# Patient Record
Sex: MALE | Race: WHITE | NOT HISPANIC OR LATINO | Employment: FULL TIME | URBAN - METROPOLITAN AREA
[De-identification: names, ages, dates, MRNs, and addresses within clinical notes are randomized per-mention and may not be internally consistent; named-entity substitution may affect disease eponyms.]

---

## 2017-01-26 ENCOUNTER — HISTORICAL (OUTPATIENT)
Dept: LAB | Facility: HOSPITAL | Age: 62
End: 2017-01-26

## 2018-02-10 ENCOUNTER — HISTORICAL (OUTPATIENT)
Dept: LAB | Facility: HOSPITAL | Age: 63
End: 2018-02-10

## 2018-02-10 LAB
ABS NEUT (OLG): 7.7 X10(3)/MCL (ref 2.1–9.2)
ALBUMIN SERPL-MCNC: 4.3 GM/DL (ref 3.4–5)
ALBUMIN/GLOB SERPL: 1.3 RATIO (ref 1.1–2)
ALP SERPL-CCNC: 88 UNIT/L (ref 46–116)
ALT SERPL-CCNC: 47 UNIT/L (ref 12–78)
AST SERPL-CCNC: 30 UNIT/L (ref 15–37)
BASOPHILS # BLD AUTO: 0.1 X10(3)/MCL
BASOPHILS NFR BLD AUTO: 1 % (ref 0–2)
BILIRUB SERPL-MCNC: 0.5 MG/DL (ref 0.2–1)
BILIRUBIN DIRECT+TOT PNL SERPL-MCNC: 0.17 MG/DL (ref 0–0.2)
BILIRUBIN DIRECT+TOT PNL SERPL-MCNC: 0.37 MG/DL (ref 0–0.8)
BUN SERPL-MCNC: 18.7 MG/DL (ref 7–18)
CALCIUM SERPL-MCNC: 9.6 MG/DL (ref 8.5–10.1)
CHLORIDE SERPL-SCNC: 102 MMOL/L (ref 98–107)
CHOLEST SERPL-MCNC: 187 MG/DL (ref 0–200)
CHOLEST/HDLC SERPL: 3.7 {RATIO} (ref 0–5)
CO2 SERPL-SCNC: 27.3 MMOL/L (ref 21–32)
CREAT SERPL-MCNC: 1.03 MG/DL (ref 0.6–1.3)
DEPRECATED CALCIDIOL+CALCIFEROL SERPL-MC: 21.87 NG/ML (ref 30–80)
EOSINOPHIL # BLD AUTO: 0.4 X10(3)/MCL
EOSINOPHIL NFR BLD AUTO: 3 %
ERYTHROCYTE [DISTWIDTH] IN BLOOD BY AUTOMATED COUNT: 12.2 % (ref 11.5–17)
GLOBULIN SER-MCNC: 3.4 GM/DL (ref 2.4–3.5)
GLUCOSE SERPL-MCNC: 104 MG/DL (ref 74–106)
HCT VFR BLD AUTO: 38.7 % (ref 42–52)
HDLC SERPL-MCNC: 50 MG/DL (ref 40–60)
HGB BLD-MCNC: 13.2 GM/DL (ref 14–18)
LDLC SERPL CALC-MCNC: 104 MG/DL (ref 0–129)
LYMPHOCYTES # BLD AUTO: 2.3 X10(3)/MCL
LYMPHOCYTES NFR BLD AUTO: 21 % (ref 13–40)
MCH RBC QN AUTO: 32.9 PG (ref 27–31)
MCHC RBC AUTO-ENTMCNC: 34.1 GM/DL (ref 33–36)
MCV RBC AUTO: 96.5 FL (ref 80–94)
MONOCYTES # BLD AUTO: 0.8 X10(3)/MCL
MONOCYTES NFR BLD AUTO: 7 % (ref 2–11)
NEUTROPHILS # BLD AUTO: 7.7 X10(3)/MCL (ref 2.1–9.2)
NEUTROPHILS NFR BLD AUTO: 68 % (ref 47–80)
PLATELET # BLD AUTO: 279 X10(3)/MCL (ref 130–400)
PMV BLD AUTO: 9.2 FL (ref 7.4–10.4)
POTASSIUM SERPL-SCNC: 4.5 MMOL/L (ref 3.5–5.1)
PROT SERPL-MCNC: 7.7 GM/DL (ref 6.4–8.2)
PSA SERPL-MCNC: 1.06 NG/ML (ref 0–4)
RBC # BLD AUTO: 4.01 X10(6)/MCL (ref 4.7–6.1)
SODIUM SERPL-SCNC: 137 MMOL/L (ref 136–145)
TRIGL SERPL-MCNC: 167 MG/DL
TSH SERPL-ACNC: 1.89 MIU/ML (ref 0.36–3.74)
VLDLC SERPL CALC-MCNC: 33 MG/DL
WBC # SPEC AUTO: 11.3 X10(3)/MCL (ref 4.5–11.5)

## 2018-02-21 ENCOUNTER — HISTORICAL (OUTPATIENT)
Dept: RADIOLOGY | Facility: HOSPITAL | Age: 63
End: 2018-02-21

## 2018-07-31 LAB — CRC RECOMMENDATION EXT: NORMAL

## 2019-02-09 ENCOUNTER — HISTORICAL (OUTPATIENT)
Dept: LAB | Facility: HOSPITAL | Age: 64
End: 2019-02-09

## 2019-02-09 LAB
ABS NEUT (OLG): 4.34 X10(3)/MCL (ref 2.1–9.2)
ALBUMIN SERPL-MCNC: 4.1 GM/DL (ref 3.4–5)
ALBUMIN/GLOB SERPL: 1.1 RATIO (ref 1.1–2)
ALP SERPL-CCNC: 85 UNIT/L (ref 46–116)
ALT SERPL-CCNC: 39 UNIT/L (ref 12–78)
AST SERPL-CCNC: 23 UNIT/L (ref 15–37)
BASOPHILS # BLD AUTO: 0 X10(3)/MCL (ref 0–0.2)
BASOPHILS NFR BLD AUTO: 1 %
BILIRUB SERPL-MCNC: 0.5 MG/DL (ref 0.2–1)
BILIRUBIN DIRECT+TOT PNL SERPL-MCNC: 0.12 MG/DL (ref 0–0.2)
BILIRUBIN DIRECT+TOT PNL SERPL-MCNC: 0.38 MG/DL (ref 0–0.8)
BUN SERPL-MCNC: 17.5 MG/DL (ref 7–18)
CALCIUM SERPL-MCNC: 9.6 MG/DL (ref 8.5–10.1)
CHLORIDE SERPL-SCNC: 103 MMOL/L (ref 98–107)
CHOLEST SERPL-MCNC: 178 MG/DL (ref 0–200)
CHOLEST/HDLC SERPL: 3 {RATIO} (ref 0–5)
CO2 SERPL-SCNC: 27 MMOL/L (ref 21–32)
CREAT SERPL-MCNC: 0.95 MG/DL (ref 0.6–1.3)
DEPRECATED CALCIDIOL+CALCIFEROL SERPL-MC: 26.83 NG/ML (ref 30–80)
EOSINOPHIL # BLD AUTO: 0.3 X10(3)/MCL (ref 0–0.9)
EOSINOPHIL NFR BLD AUTO: 4 %
ERYTHROCYTE [DISTWIDTH] IN BLOOD BY AUTOMATED COUNT: 11.8 % (ref 11.5–17)
GLOBULIN SER-MCNC: 3.9 GM/DL (ref 2.4–3.5)
GLUCOSE SERPL-MCNC: 92 MG/DL (ref 74–106)
HCT VFR BLD AUTO: 40.3 % (ref 42–52)
HDLC SERPL-MCNC: 59 MG/DL (ref 40–60)
HGB BLD-MCNC: 13.5 GM/DL (ref 14–18)
IMM GRANULOCYTES # BLD AUTO: 0.01 % (ref 0–0.02)
IMM GRANULOCYTES NFR BLD AUTO: 0.1 % (ref 0–0.43)
LDLC SERPL CALC-MCNC: 103 MG/DL (ref 0–129)
LYMPHOCYTES # BLD AUTO: 2 X10(3)/MCL (ref 0.6–4.6)
LYMPHOCYTES NFR BLD AUTO: 28 %
MCH RBC QN AUTO: 33.6 PG (ref 27–31)
MCHC RBC AUTO-ENTMCNC: 33.5 GM/DL (ref 33–36)
MCV RBC AUTO: 100.2 FL (ref 80–94)
MONOCYTES # BLD AUTO: 0.5 X10(3)/MCL (ref 0.1–1.3)
MONOCYTES NFR BLD AUTO: 7 %
NEUTROPHILS # BLD AUTO: 4.34 X10(3)/MCL (ref 1.4–7.9)
NEUTROPHILS NFR BLD AUTO: 60 %
PLATELET # BLD AUTO: 277 X10(3)/MCL (ref 130–400)
PMV BLD AUTO: 11 FL (ref 9.4–12.4)
POTASSIUM SERPL-SCNC: 4.5 MMOL/L (ref 3.5–5.1)
PROT SERPL-MCNC: 8 GM/DL (ref 6.4–8.2)
PSA SERPL-MCNC: 2.36 NG/ML (ref 0–4)
RBC # BLD AUTO: 4.02 X10(6)/MCL (ref 4.7–6.1)
SODIUM SERPL-SCNC: 139 MMOL/L (ref 136–145)
TRIGL SERPL-MCNC: 79 MG/DL
VLDLC SERPL CALC-MCNC: 16 MG/DL
WBC # SPEC AUTO: 7.2 X10(3)/MCL (ref 4.5–11.5)

## 2019-08-31 ENCOUNTER — HISTORICAL (OUTPATIENT)
Dept: LAB | Facility: HOSPITAL | Age: 64
End: 2019-08-31

## 2019-08-31 LAB — PSA SERPL-MCNC: 1.28 NG/ML (ref 0–4)

## 2020-02-22 ENCOUNTER — HISTORICAL (OUTPATIENT)
Dept: LAB | Facility: HOSPITAL | Age: 65
End: 2020-02-22

## 2020-02-22 LAB
ABS NEUT (OLG): 2.93 X10(3)/MCL (ref 2.1–9.2)
ALBUMIN SERPL-MCNC: 4.2 GM/DL (ref 3.4–5)
ALBUMIN/GLOB SERPL: 1.2 RATIO (ref 1.1–2)
ALP SERPL-CCNC: 76 UNIT/L (ref 46–116)
ALT SERPL-CCNC: 48 UNIT/L (ref 12–78)
AST SERPL-CCNC: 33 UNIT/L (ref 15–37)
BASOPHILS # BLD AUTO: 0 X10(3)/MCL (ref 0–0.2)
BASOPHILS NFR BLD AUTO: 1 %
BILIRUB SERPL-MCNC: 0.4 MG/DL (ref 0.2–1)
BILIRUBIN DIRECT+TOT PNL SERPL-MCNC: 0.15 MG/DL (ref 0–0.2)
BILIRUBIN DIRECT+TOT PNL SERPL-MCNC: 0.25 MG/DL (ref 0–0.8)
BUN SERPL-MCNC: 19.3 MG/DL (ref 7–18)
CALCIUM SERPL-MCNC: 9.8 MG/DL (ref 8.5–10.1)
CHLORIDE SERPL-SCNC: 101 MMOL/L (ref 98–107)
CHOLEST SERPL-MCNC: 189 MG/DL (ref 0–200)
CHOLEST/HDLC SERPL: 3.6 {RATIO} (ref 0–5)
CO2 SERPL-SCNC: 27.8 MMOL/L (ref 21–32)
CREAT SERPL-MCNC: 0.92 MG/DL (ref 0.6–1.3)
DEPRECATED CALCIDIOL+CALCIFEROL SERPL-MC: 24.62 NG/ML (ref 30–80)
EOSINOPHIL # BLD AUTO: 0.2 X10(3)/MCL (ref 0–0.9)
EOSINOPHIL NFR BLD AUTO: 4 %
ERYTHROCYTE [DISTWIDTH] IN BLOOD BY AUTOMATED COUNT: 11.8 % (ref 11.5–17)
EST. AVERAGE GLUCOSE BLD GHB EST-MCNC: 108 MG/DL
GLOBULIN SER-MCNC: 3.5 GM/DL (ref 2.4–3.5)
GLUCOSE SERPL-MCNC: 106 MG/DL (ref 74–106)
HBA1C MFR BLD: 5.4 % (ref 4.5–6.2)
HCT VFR BLD AUTO: 39.8 % (ref 42–52)
HDLC SERPL-MCNC: 52 MG/DL (ref 40–60)
HGB BLD-MCNC: 13.6 GM/DL (ref 14–18)
IMM GRANULOCYTES # BLD AUTO: 0.01 % (ref 0–0.02)
IMM GRANULOCYTES NFR BLD AUTO: 0.2 % (ref 0–0.43)
LDLC SERPL CALC-MCNC: 98 MG/DL (ref 0–129)
LYMPHOCYTES # BLD AUTO: 2.1 X10(3)/MCL (ref 0.6–4.6)
LYMPHOCYTES NFR BLD AUTO: 37 %
MCH RBC QN AUTO: 33.1 PG (ref 27–31)
MCHC RBC AUTO-ENTMCNC: 34.2 GM/DL (ref 33–36)
MCV RBC AUTO: 96.8 FL (ref 80–94)
MONOCYTES # BLD AUTO: 0.5 X10(3)/MCL (ref 0.1–1.3)
MONOCYTES NFR BLD AUTO: 8 %
NEUTROPHILS # BLD AUTO: 2.93 X10(3)/MCL (ref 1.4–7.9)
NEUTROPHILS NFR BLD AUTO: 50 %
PLATELET # BLD AUTO: 269 X10(3)/MCL (ref 130–400)
PMV BLD AUTO: 10.2 FL (ref 9.4–12.4)
POTASSIUM SERPL-SCNC: 4.6 MMOL/L (ref 3.5–5.1)
PROT SERPL-MCNC: 7.7 GM/DL (ref 6.4–8.2)
PSA SERPL-MCNC: 1.87 NG/ML (ref 0–4)
RBC # BLD AUTO: 4.11 X10(6)/MCL (ref 4.7–6.1)
SODIUM SERPL-SCNC: 139 MMOL/L (ref 136–145)
TRIGL SERPL-MCNC: 196 MG/DL
TSH SERPL-ACNC: 2.32 MIU/ML (ref 0.36–3.74)
VLDLC SERPL CALC-MCNC: 39 MG/DL
WBC # SPEC AUTO: 5.8 X10(3)/MCL (ref 4.5–11.5)

## 2020-02-24 LAB
APPEARANCE, UA: CLEAR
BACTERIA SPEC CULT: NORMAL
BILIRUB UR QL STRIP: NEGATIVE
COLOR UR: YELLOW
GLUCOSE (UA): NEGATIVE
HGB UR QL STRIP: NEGATIVE
KETONES UR QL STRIP: NEGATIVE
LEUKOCYTE ESTERASE UR QL STRIP: NEGATIVE
NITRITE UR QL STRIP: NEGATIVE
PH UR STRIP: 6 [PH] (ref 5–9)
PROT UR QL STRIP: NEGATIVE
RBC #/AREA URNS HPF: NORMAL /[HPF]
SP GR UR STRIP: 1.02 (ref 1–1.03)
SQUAMOUS EPITHELIAL, UA: NORMAL
UROBILINOGEN UR STRIP-ACNC: 0.2
WBC #/AREA URNS HPF: NORMAL /[HPF]

## 2020-05-05 ENCOUNTER — HISTORICAL (OUTPATIENT)
Dept: LAB | Facility: HOSPITAL | Age: 65
End: 2020-05-05

## 2021-02-26 ENCOUNTER — HISTORICAL (OUTPATIENT)
Dept: LAB | Facility: HOSPITAL | Age: 66
End: 2021-02-26

## 2021-02-26 LAB
ABS NEUT (OLG): 2.95 X10(3)/MCL (ref 2.1–9.2)
ALBUMIN SERPL-MCNC: 4.2 GM/DL (ref 3.4–4.8)
ALBUMIN/GLOB SERPL: 1.4 RATIO (ref 1.1–2)
ALP SERPL-CCNC: 72 UNIT/L (ref 40–150)
ALT SERPL-CCNC: 53 UNIT/L (ref 0–55)
APPEARANCE, UA: CLEAR
AST SERPL-CCNC: 37 UNIT/L (ref 5–34)
BACTERIA SPEC CULT: ABNORMAL
BASOPHILS # BLD AUTO: 0 X10(3)/MCL (ref 0–0.2)
BASOPHILS NFR BLD AUTO: 1 %
BILIRUB SERPL-MCNC: 0.7 MG/DL
BILIRUB UR QL STRIP: NEGATIVE
BILIRUBIN DIRECT+TOT PNL SERPL-MCNC: 0.2 MG/DL (ref 0–0.5)
BILIRUBIN DIRECT+TOT PNL SERPL-MCNC: 0.5 MG/DL (ref 0–0.8)
BUN SERPL-MCNC: 20 MG/DL (ref 8.4–25.7)
CALCIUM SERPL-MCNC: 8.9 MG/DL (ref 8.8–10)
CHLORIDE SERPL-SCNC: 104 MMOL/L (ref 98–107)
CHOLEST SERPL-MCNC: 189 MG/DL
CHOLEST/HDLC SERPL: 4 {RATIO} (ref 0–5)
CO2 SERPL-SCNC: 24 MMOL/L (ref 23–31)
COLOR UR: YELLOW
CREAT SERPL-MCNC: 0.87 MG/DL (ref 0.73–1.18)
DEPRECATED CALCIDIOL+CALCIFEROL SERPL-MC: 31.2 NG/ML (ref 30–80)
EOSINOPHIL # BLD AUTO: 0.3 X10(3)/MCL (ref 0–0.9)
EOSINOPHIL NFR BLD AUTO: 5 %
ERYTHROCYTE [DISTWIDTH] IN BLOOD BY AUTOMATED COUNT: 11.6 % (ref 11.5–17)
GLOBULIN SER-MCNC: 3.1 GM/DL (ref 2.4–3.5)
GLUCOSE (UA): NEGATIVE
GLUCOSE SERPL-MCNC: 102 MG/DL (ref 82–115)
HCT VFR BLD AUTO: 40.7 % (ref 42–52)
HDLC SERPL-MCNC: 54 MG/DL (ref 35–60)
HGB BLD-MCNC: 13.9 GM/DL (ref 14–18)
HGB UR QL STRIP: NEGATIVE
IMM GRANULOCYTES # BLD AUTO: 0.02 % (ref 0–0.02)
IMM GRANULOCYTES NFR BLD AUTO: 0.3 % (ref 0–0.43)
KETONES UR QL STRIP: NEGATIVE
LDLC SERPL CALC-MCNC: 109 MG/DL (ref 50–140)
LEUKOCYTE ESTERASE UR QL STRIP: ABNORMAL
LYMPHOCYTES # BLD AUTO: 2.4 X10(3)/MCL (ref 0.6–4.6)
LYMPHOCYTES NFR BLD AUTO: 39 %
MCH RBC QN AUTO: 34.1 PG (ref 27–31)
MCHC RBC AUTO-ENTMCNC: 34.2 GM/DL (ref 33–36)
MCV RBC AUTO: 99.8 FL (ref 80–94)
MONOCYTES # BLD AUTO: 0.5 X10(3)/MCL (ref 0.1–1.3)
MONOCYTES NFR BLD AUTO: 8 %
NEUTROPHILS # BLD AUTO: 2.95 X10(3)/MCL (ref 1.4–7.9)
NEUTROPHILS NFR BLD AUTO: 47 %
NITRITE UR QL STRIP: NEGATIVE
PH UR STRIP: 5.5 [PH] (ref 5–9)
PLATELET # BLD AUTO: 233 X10(3)/MCL (ref 130–400)
PMV BLD AUTO: 10.2 FL (ref 9.4–12.4)
POTASSIUM SERPL-SCNC: 4.6 MMOL/L (ref 3.5–5.1)
PROT SERPL-MCNC: 7.3 GM/DL (ref 5.8–7.6)
PROT UR QL STRIP: NEGATIVE
PSA SERPL-MCNC: 1.38 NG/ML
RBC # BLD AUTO: 4.08 X10(6)/MCL (ref 4.7–6.1)
RBC #/AREA URNS HPF: ABNORMAL /[HPF]
SODIUM SERPL-SCNC: 140 MMOL/L (ref 136–145)
SP GR UR STRIP: >=1.03 (ref 1–1.03)
SQUAMOUS EPITHELIAL, UA: ABNORMAL
TRIGL SERPL-MCNC: 131 MG/DL (ref 34–140)
TSH SERPL-ACNC: 2.4 UIU/ML (ref 0.35–4.94)
UROBILINOGEN UR STRIP-ACNC: 0.2
VLDLC SERPL CALC-MCNC: 26 MG/DL
WBC # SPEC AUTO: 6.2 X10(3)/MCL (ref 4.5–11.5)
WBC #/AREA URNS HPF: ABNORMAL /HPF

## 2021-03-15 ENCOUNTER — HISTORICAL (OUTPATIENT)
Dept: RADIOLOGY | Facility: HOSPITAL | Age: 66
End: 2021-03-15

## 2021-10-04 ENCOUNTER — HISTORICAL (OUTPATIENT)
Dept: RADIOLOGY | Facility: HOSPITAL | Age: 66
End: 2021-10-04

## 2021-10-11 ENCOUNTER — HISTORICAL (OUTPATIENT)
Dept: LAB | Facility: HOSPITAL | Age: 66
End: 2021-10-11

## 2021-10-11 LAB — SARS-COV-2 AG RESP QL IA.RAPID: POSITIVE

## 2021-10-12 ENCOUNTER — HISTORICAL (OUTPATIENT)
Dept: ADMINISTRATIVE | Facility: HOSPITAL | Age: 66
End: 2021-10-12

## 2022-01-24 ENCOUNTER — HISTORICAL (OUTPATIENT)
Dept: LAB | Facility: HOSPITAL | Age: 67
End: 2022-01-24

## 2022-01-24 LAB — SARS-COV-2 AG RESP QL IA.RAPID: NEGATIVE

## 2022-03-04 ENCOUNTER — HISTORICAL (OUTPATIENT)
Dept: LAB | Facility: HOSPITAL | Age: 67
End: 2022-03-04

## 2022-03-04 LAB
ABS NEUT (OLG): 3.29 (ref 2.1–9.2)
ALBUMIN SERPL-MCNC: 4.2 G/DL (ref 3.4–4.8)
ALBUMIN/GLOB SERPL: 1.3 {RATIO} (ref 1.1–2)
ALP SERPL-CCNC: 81 U/L (ref 40–150)
ALT SERPL-CCNC: 65 U/L (ref 0–55)
APPEARANCE, UA: CLEAR
AST SERPL-CCNC: 58 U/L (ref 5–34)
BACTERIA SPEC CULT: NORMAL
BASOPHILS # BLD AUTO: 0 10*3/UL (ref 0–0.2)
BASOPHILS NFR BLD AUTO: 1 %
BILIRUB SERPL-MCNC: 0.6 MG/DL
BILIRUB UR QL STRIP: NEGATIVE
BILIRUBIN DIRECT+TOT PNL SERPL-MCNC: 0.3 (ref 0–0.5)
BILIRUBIN DIRECT+TOT PNL SERPL-MCNC: 0.3 (ref 0–0.8)
BUN SERPL-MCNC: 21.6 MG/DL (ref 8.4–25.7)
CALCIUM SERPL-MCNC: 10 MG/DL (ref 8.7–10.5)
CHLORIDE SERPL-SCNC: 102 MMOL/L (ref 98–107)
CHOLEST SERPL-MCNC: 186 MG/DL
CHOLEST/HDLC SERPL: 4 {RATIO} (ref 0–5)
CO2 SERPL-SCNC: 27 MMOL/L (ref 23–31)
COLOR UR: YELLOW
CREAT SERPL-MCNC: 0.95 MG/DL (ref 0.73–1.18)
DEPRECATED CALCIDIOL+CALCIFEROL SERPL-MC: 29.9 NG/ML (ref 30–80)
EOSINOPHIL # BLD AUTO: 0.3 10*3/UL (ref 0–0.9)
EOSINOPHIL NFR BLD AUTO: 4 %
ERYTHROCYTE [DISTWIDTH] IN BLOOD BY AUTOMATED COUNT: 11.5 % (ref 11.5–17)
EST. AVERAGE GLUCOSE BLD GHB EST-MCNC: 105.4 MG/DL
GLOBULIN SER-MCNC: 3.3 G/DL (ref 2.4–3.5)
GLUCOSE (UA): NEGATIVE
GLUCOSE SERPL-MCNC: 102 MG/DL (ref 82–115)
HBA1C MFR BLD: 5.3 %
HCT VFR BLD AUTO: 42.9 % (ref 42–52)
HDLC SERPL-MCNC: 53 MG/DL (ref 35–60)
HEMOLYSIS INTERF INDEX SERPL-ACNC: 5
HGB BLD-MCNC: 14.3 G/DL (ref 14–18)
HGB UR QL STRIP: NEGATIVE
ICTERIC INTERF INDEX SERPL-ACNC: 1
IMM GRANULOCYTES # BLD AUTO: 0.01 10*3/UL (ref 0–0.02)
IMM GRANULOCYTES NFR BLD AUTO: 0.2 % (ref 0–0.43)
KETONES UR QL STRIP: NEGATIVE
LDLC SERPL CALC-MCNC: 111 MG/DL (ref 50–140)
LEUKOCYTE ESTERASE UR QL STRIP: NEGATIVE
LIPEMIC INTERF INDEX SERPL-ACNC: 2
LYMPHOCYTES # BLD AUTO: 2.4 10*3/UL (ref 0.6–4.6)
LYMPHOCYTES NFR BLD AUTO: 36 %
MANUAL DIFF? (OHS): NO
MCH RBC QN AUTO: 33.4 PG (ref 27–31)
MCHC RBC AUTO-ENTMCNC: 33.3 G/DL (ref 33–36)
MCV RBC AUTO: 100.2 FL (ref 80–94)
MONOCYTES # BLD AUTO: 0.6 10*3/UL (ref 0.1–1.3)
MONOCYTES NFR BLD AUTO: 8 %
MUCOUS THREADS URNS QL MICRO: NORMAL
NEUTROPHILS # BLD AUTO: 3.29 10*3/UL (ref 1.4–7.9)
NEUTROPHILS NFR BLD AUTO: 50 %
NITRITE UR QL STRIP: NEGATIVE
PH UR STRIP: 6 [PH] (ref 5–9)
PLATELET # BLD AUTO: 250 10*3/UL (ref 130–400)
PMV BLD AUTO: 9.6 FL (ref 9.4–12.4)
POTASSIUM SERPL-SCNC: 4.8 MMOL/L (ref 3.5–5.1)
PROT SERPL-MCNC: 7.5 G/DL (ref 5.8–7.6)
PROT UR QL STRIP: NEGATIVE
PSA SERPL-MCNC: 1.14 NG/ML
RBC # BLD AUTO: 4.28 10*6/UL (ref 4.7–6.1)
RBC #/AREA URNS HPF: NORMAL /[HPF] (ref 0–2)
SODIUM SERPL-SCNC: 138 MMOL/L (ref 136–145)
SP GR UR STRIP: 1.02 (ref 1–1.03)
SQUAMOUS EPITHELIAL, UA: NORMAL
TRIGL SERPL-MCNC: 108 MG/DL (ref 34–140)
TSH SERPL-ACNC: 2.76 M[IU]/L (ref 0.35–4.94)
UROBILINOGEN UR STRIP-ACNC: 0.2
VIT B12 SERPL-MCNC: 450 PG/ML (ref 213–816)
VLDLC SERPL CALC-MCNC: 22 MG/DL
WBC # SPEC AUTO: 6.6 10*3/UL (ref 4.5–11.5)
WBC #/AREA URNS HPF: NORMAL /[HPF] (ref 0–2)

## 2022-04-11 ENCOUNTER — HISTORICAL (OUTPATIENT)
Dept: ADMINISTRATIVE | Facility: HOSPITAL | Age: 67
End: 2022-04-11
Payer: COMMERCIAL

## 2022-04-28 VITALS
BODY MASS INDEX: 37.06 KG/M2 | DIASTOLIC BLOOD PRESSURE: 79 MMHG | OXYGEN SATURATION: 98 % | SYSTOLIC BLOOD PRESSURE: 156 MMHG | HEIGHT: 69 IN | WEIGHT: 250.25 LBS

## 2022-05-31 ENCOUNTER — HOSPITAL ENCOUNTER (EMERGENCY)
Facility: HOSPITAL | Age: 67
Discharge: HOME | End: 2022-05-31
Attending: EMERGENCY MEDICINE
Payer: COMMERCIAL

## 2022-05-31 VITALS
HEART RATE: 98 BPM | WEIGHT: 245 LBS | BODY MASS INDEX: 36.29 KG/M2 | SYSTOLIC BLOOD PRESSURE: 129 MMHG | RESPIRATION RATE: 18 BRPM | OXYGEN SATURATION: 97 % | HEIGHT: 69 IN | DIASTOLIC BLOOD PRESSURE: 75 MMHG

## 2022-05-31 DIAGNOSIS — R07.9 CHEST PAIN: ICD-10-CM

## 2022-05-31 DIAGNOSIS — R07.89 CHEST WALL PAIN: Primary | ICD-10-CM

## 2022-05-31 DIAGNOSIS — I48.91 ATRIAL FIBRILLATION, UNSPECIFIED TYPE: ICD-10-CM

## 2022-05-31 PROBLEM — E55.9 VITAMIN D DEFICIENCY: Status: ACTIVE | Noted: 2022-05-31

## 2022-05-31 PROBLEM — E66.9 OBESITY: Status: ACTIVE | Noted: 2022-05-31

## 2022-05-31 PROBLEM — M54.50 LOW BACK PAIN: Status: ACTIVE | Noted: 2022-05-31

## 2022-05-31 PROBLEM — K64.9 HEMORRHOIDS: Status: ACTIVE | Noted: 2022-05-31

## 2022-05-31 PROBLEM — E78.5 HYPERLIPIDEMIA: Status: ACTIVE | Noted: 2022-05-31

## 2022-05-31 PROBLEM — I10 HYPERTENSION: Status: ACTIVE | Noted: 2022-05-31

## 2022-05-31 PROBLEM — U07.1 DISEASE DUE TO SEVERE ACUTE RESPIRATORY SYNDROME CORONAVIRUS 2 (SARS-COV-2): Status: ACTIVE | Noted: 2022-05-31

## 2022-05-31 LAB
ALBUMIN SERPL-MCNC: 4.2 GM/DL (ref 3.4–4.8)
ALBUMIN/GLOB SERPL: 1.3 RATIO (ref 1.1–2)
ALP SERPL-CCNC: 79 UNIT/L (ref 40–150)
ALT SERPL-CCNC: 56 UNIT/L (ref 0–55)
AST SERPL-CCNC: 43 UNIT/L (ref 5–34)
BASOPHILS # BLD AUTO: 0.09 X10(3)/MCL (ref 0–0.2)
BASOPHILS NFR BLD AUTO: 1 %
BILIRUBIN DIRECT+TOT PNL SERPL-MCNC: 0.6 MG/DL
BUN SERPL-MCNC: 14.3 MG/DL (ref 8.4–25.7)
CALCIUM SERPL-MCNC: 9.7 MG/DL (ref 8.8–10)
CHLORIDE SERPL-SCNC: 103 MMOL/L (ref 98–107)
CO2 SERPL-SCNC: 23 MMOL/L (ref 23–31)
CREAT SERPL-MCNC: 0.8 MG/DL (ref 0.73–1.18)
EOSINOPHIL # BLD AUTO: 0.26 X10(3)/MCL (ref 0–0.9)
EOSINOPHIL NFR BLD AUTO: 2.8 %
ERYTHROCYTE [DISTWIDTH] IN BLOOD BY AUTOMATED COUNT: 11.7 % (ref 11.5–17)
GLOBULIN SER-MCNC: 3.3 GM/DL (ref 2.4–3.5)
GLUCOSE SERPL-MCNC: 115 MG/DL (ref 82–115)
HCT VFR BLD AUTO: 42.3 % (ref 42–52)
HGB BLD-MCNC: 14.1 GM/DL (ref 14–18)
IMM GRANULOCYTES # BLD AUTO: 0.02 X10(3)/MCL (ref 0–0.02)
IMM GRANULOCYTES NFR BLD AUTO: 0.2 % (ref 0–0.43)
LYMPHOCYTES # BLD AUTO: 2.82 X10(3)/MCL (ref 0.6–4.6)
LYMPHOCYTES NFR BLD AUTO: 29.8 %
MAGNESIUM SERPL-MCNC: 2.4 MG/DL (ref 1.6–2.6)
MCH RBC QN AUTO: 33.4 PG (ref 27–31)
MCHC RBC AUTO-ENTMCNC: 33.3 MG/DL (ref 33–36)
MCV RBC AUTO: 100.2 FL (ref 80–94)
MONOCYTES # BLD AUTO: 0.74 X10(3)/MCL (ref 0.1–1.3)
MONOCYTES NFR BLD AUTO: 7.8 %
NEUTROPHILS # BLD AUTO: 5.5 X10(3)/MCL (ref 2.1–9.2)
NEUTROPHILS NFR BLD AUTO: 58.4 %
PLATELET # BLD AUTO: 254 X10(3)/MCL (ref 130–400)
PMV BLD AUTO: 10.4 FL (ref 9.4–12.4)
POC CARDIAC TROPONIN I: 0 NG/ML
POTASSIUM SERPL-SCNC: 4.6 MMOL/L (ref 3.5–5.1)
PROT SERPL-MCNC: 7.5 GM/DL (ref 5.8–7.6)
RBC # BLD AUTO: 4.22 X10(6)/MCL (ref 4.7–6.1)
SAMPLE: NORMAL
SODIUM SERPL-SCNC: 136 MMOL/L (ref 136–145)
TSH SERPL-ACNC: 3.12 UIU/ML (ref 0.35–4.94)
WBC # SPEC AUTO: 9.5 X10(3)/MCL (ref 4.5–11.5)

## 2022-05-31 PROCEDURE — 83735 ASSAY OF MAGNESIUM: CPT | Performed by: EMERGENCY MEDICINE

## 2022-05-31 PROCEDURE — 84443 ASSAY THYROID STIM HORMONE: CPT | Performed by: EMERGENCY MEDICINE

## 2022-05-31 PROCEDURE — 99284 EMERGENCY DEPT VISIT MOD MDM: CPT | Mod: 25

## 2022-05-31 PROCEDURE — 36415 COLL VENOUS BLD VENIPUNCTURE: CPT | Performed by: EMERGENCY MEDICINE

## 2022-05-31 PROCEDURE — 80053 COMPREHEN METABOLIC PANEL: CPT | Performed by: EMERGENCY MEDICINE

## 2022-05-31 PROCEDURE — 85025 COMPLETE CBC W/AUTO DIFF WBC: CPT | Performed by: EMERGENCY MEDICINE

## 2022-05-31 PROCEDURE — 84484 ASSAY OF TROPONIN QUANT: CPT

## 2022-05-31 RX ORDER — HYDROCHLOROTHIAZIDE 25 MG/1
25 TABLET ORAL DAILY
COMMUNITY
Start: 2022-05-16 | End: 2022-07-25

## 2022-05-31 RX ORDER — AMLODIPINE AND OLMESARTAN MEDOXOMIL 10; 40 MG/1; MG/1
1 TABLET ORAL DAILY
COMMUNITY
Start: 2022-05-16 | End: 2022-07-25

## 2022-05-31 NOTE — ED NOTES
Spoke with Dina with [facility] consult for telecardiology. All required information faxed to [facility], awaiting confirmation on fax and physician to call in on telecardiology monitor.

## 2022-05-31 NOTE — ED PROVIDER NOTES
"Encounter Date: 5/31/2022       History     Chief Complaint   Patient presents with    charito pain    Chest Pain     Patient reports CP as dull pain that started at work. Verbalizes small "twinge" on Sunday . Denies heart hx     This 65 y/o man presents with c/o dull pain in the right upper chest that started at work yesterday. He states he has been doing some heavy work and thought it was a pulled muscle. He took ibuprofen and iced it but he says it has been constant. It is tender to palpation.        Review of patient's allergies indicates:   Allergen Reactions    Morphine Rash     Past Medical History:   Diagnosis Date    GERD (gastroesophageal reflux disease)     Hyperlipidemia     Hypertension      History reviewed. No pertinent surgical history.  History reviewed. No pertinent family history.  Social History     Tobacco Use    Smoking status: Never Smoker    Smokeless tobacco: Never Used   Substance Use Topics    Alcohol use: Yes     Alcohol/week: 2.0 standard drinks     Types: 2 Cans of beer per week    Drug use: Never     Review of Systems   Constitutional: Negative.    HENT: Negative.    Eyes: Negative.    Respiratory: Negative.    Cardiovascular: Positive for chest pain.   Gastrointestinal: Negative.    Endocrine: Negative.    Genitourinary: Negative.    Musculoskeletal: Negative.    Skin: Negative.    Allergic/Immunologic: Negative.    Neurological: Negative.    Hematological: Negative.    Psychiatric/Behavioral: Negative.        Physical Exam     Initial Vitals [05/31/22 0647]   BP Pulse Resp Temp SpO2   (!) 149/78 99 20 -- 96 %      MAP       --         Physical Exam    Nursing note and vitals reviewed.  Constitutional: He appears well-developed and well-nourished.   HENT:   Head: Normocephalic and atraumatic.   Eyes: Conjunctivae and EOM are normal. Pupils are equal, round, and reactive to light.   Cardiovascular: Normal rate, regular rhythm, normal heart sounds and intact distal pulses. "   Pulmonary/Chest: Breath sounds normal.   Abdominal: Abdomen is soft. Bowel sounds are normal.   Musculoskeletal:         General: Normal range of motion.     Neurological: He is alert and oriented to person, place, and time. He has normal strength.   Skin: Skin is warm and dry.   Psychiatric: He has a normal mood and affect. His behavior is normal. Judgment and thought content normal.         ED Course   Procedures  Labs Reviewed   COMPREHENSIVE METABOLIC PANEL - Abnormal; Notable for the following components:       Result Value    Alanine Aminotransferase 56 (*)     Aspartate Aminotransferase 43 (*)     All other components within normal limits   CBC WITH DIFFERENTIAL - Abnormal; Notable for the following components:    RBC 4.22 (*)     .2 (*)     MCH 33.4 (*)     IG# 0.02 (*)     All other components within normal limits   TSH - Normal   MAGNESIUM - Normal   TROPONIN ISTAT   CBC W/ AUTO DIFFERENTIAL    Narrative:     The following orders were created for panel order CBC auto differential.  Procedure                               Abnormality         Status                     ---------                               -----------         ------                     CBC with Differential[[id number]]        Abnormal            Final result                 Please view results for these tests on the individual orders.     EKG Readings: (Independently Interpreted)   Initial Reading: No STEMI. Rhythm: Atrial Fibrillation. Ectopy: No Ectopy. Conduction: Normal. ST Segments: Normal ST Segments. T Waves: Normal. Clinical Impression: Atrial Fibrillation with RVR   06:43 AM.       Imaging Results          X-Ray Chest PA And Lateral (Final result)  Result time 05/31/22 08:32:28    Final result by Richard Fontenot MD (05/31/22 08:32:28)                 Impression:      No acute cardiopulmonary process.      Electronically signed by: Richard Fontenot  Date:    05/31/2022  Time:    08:32             Narrative:    EXAMINATION:  XR  CHEST PA AND LATERAL    CLINICAL HISTORY:  Chest pain, unspecified    TECHNIQUE:  PA and lateral views of the chest.    COMPARISON:  None available.    FINDINGS:  The cardiomediastinal silhouette is normal in size and contour. Pulmonary vascularity is within normal limits. The lungs are without focal consolidation, pleural effusion, or pneumothorax.    The imaged osseous structures and soft tissues are without acute abnormality.                                 Medications - No data to display                       Clinical Impression:   Final diagnoses:  [R07.9] Chest pain  [R07.89] Chest wall pain (Primary)  [I48.91] Atrial fibrillation, unspecified type          ED Disposition Condition    Discharge Stable        ED Prescriptions     Medication Sig Dispense Start Date End Date Auth. Provider    rivaroxaban (XARELTO) 20 mg Tab Take 1 tablet (20 mg total) by mouth daily with dinner or evening meal. 30 tablet 5/31/2022 6/30/2022 Jose Angel Lei MD        Follow-up Information     Follow up With Specialties Details Why Contact Info    Lissy Maravilla MD Cardiovascular Disease, Cardiology Schedule an appointment as soon as possible for a visit   [address] [facility] [address]  [phone]             Jose Angel Lei MD  05/31/22 0918

## 2022-07-18 ENCOUNTER — LAB VISIT (OUTPATIENT)
Dept: LAB | Facility: HOSPITAL | Age: 67
End: 2022-07-18
Attending: NURSE PRACTITIONER
Payer: COMMERCIAL

## 2022-07-18 DIAGNOSIS — I48.19 PERSISTENT ATRIAL FIBRILLATION: Primary | ICD-10-CM

## 2022-07-18 LAB
ANION GAP SERPL CALC-SCNC: 11 MEQ/L
BUN SERPL-MCNC: 16.8 MG/DL (ref 8.4–25.7)
CALCIUM SERPL-MCNC: 9.8 MG/DL (ref 8.8–10)
CHLORIDE SERPL-SCNC: 104 MMOL/L (ref 98–107)
CO2 SERPL-SCNC: 23 MMOL/L (ref 23–31)
CREAT SERPL-MCNC: 1.02 MG/DL (ref 0.73–1.18)
CREAT/UREA NIT SERPL: 16
ERYTHROCYTE [DISTWIDTH] IN BLOOD BY AUTOMATED COUNT: 11.5 % (ref 11.5–17)
GLUCOSE SERPL-MCNC: 112 MG/DL (ref 82–115)
HCT VFR BLD AUTO: 40.7 % (ref 42–52)
HGB BLD-MCNC: 13.5 GM/DL (ref 14–18)
INR BLD: 1.23 (ref 0–1.3)
MCH RBC QN AUTO: 33.6 PG (ref 27–31)
MCHC RBC AUTO-ENTMCNC: 33.2 MG/DL (ref 33–36)
MCV RBC AUTO: 101.2 FL (ref 80–94)
PLATELET # BLD AUTO: 233 X10(3)/MCL (ref 130–400)
PMV BLD AUTO: 10.1 FL (ref 7.4–10.4)
POTASSIUM SERPL-SCNC: 4.6 MMOL/L (ref 3.5–5.1)
PROTHROMBIN TIME: 12.3 SECONDS (ref 12.5–14.5)
RBC # BLD AUTO: 4.02 X10(6)/MCL (ref 4.7–6.1)
SODIUM SERPL-SCNC: 138 MMOL/L (ref 136–145)
WBC # SPEC AUTO: 6.5 X10(3)/MCL (ref 4.5–11.5)

## 2022-07-18 PROCEDURE — 80048 BASIC METABOLIC PNL TOTAL CA: CPT

## 2022-07-18 PROCEDURE — 85610 PROTHROMBIN TIME: CPT

## 2022-07-18 PROCEDURE — 85027 COMPLETE CBC AUTOMATED: CPT

## 2022-07-18 PROCEDURE — 36415 COLL VENOUS BLD VENIPUNCTURE: CPT

## 2022-07-25 ENCOUNTER — HOSPITAL ENCOUNTER (OUTPATIENT)
Dept: CARDIOLOGY | Facility: HOSPITAL | Age: 67
Discharge: HOME | End: 2022-07-25
Attending: INTERNAL MEDICINE
Payer: COMMERCIAL

## 2022-07-25 ENCOUNTER — ANESTHESIA EVENT (OUTPATIENT)
Dept: CARDIOLOGY | Facility: HOSPITAL | Age: 67
End: 2022-07-25
Payer: COMMERCIAL

## 2022-07-25 ENCOUNTER — ANESTHESIA (OUTPATIENT)
Dept: CARDIOLOGY | Facility: HOSPITAL | Age: 67
End: 2022-07-25
Payer: COMMERCIAL

## 2022-07-25 VITALS
OXYGEN SATURATION: 98 % | SYSTOLIC BLOOD PRESSURE: 139 MMHG | HEART RATE: 62 BPM | RESPIRATION RATE: 16 BRPM | TEMPERATURE: 99 F | DIASTOLIC BLOOD PRESSURE: 66 MMHG

## 2022-07-25 VITALS — HEIGHT: 69 IN | BODY MASS INDEX: 39.12 KG/M2 | WEIGHT: 264.13 LBS

## 2022-07-25 DIAGNOSIS — I48.19 PERSISTENT ATRIAL FIBRILLATION: Primary | ICD-10-CM

## 2022-07-25 DIAGNOSIS — I48.19 PERSISTENT ATRIAL FIBRILLATION: ICD-10-CM

## 2022-07-25 DIAGNOSIS — I48.91 ATRIAL FIBRILLATION: ICD-10-CM

## 2022-07-25 LAB — EJECTION FRACTION: 55 %

## 2022-07-25 PROCEDURE — 37000009 HC ANESTHESIA EA ADD 15 MINS

## 2022-07-25 PROCEDURE — 63600175 PHARM REV CODE 636 W HCPCS

## 2022-07-25 PROCEDURE — 37000008 HC ANESTHESIA 1ST 15 MINUTES

## 2022-07-25 PROCEDURE — 93010 ELECTROCARDIOGRAM REPORT: CPT | Mod: ,,, | Performed by: INTERNAL MEDICINE

## 2022-07-25 PROCEDURE — 93325 DOPPLER ECHO COLOR FLOW MAPG: CPT

## 2022-07-25 PROCEDURE — 93312 ECHO TRANSESOPHAGEAL: CPT

## 2022-07-25 PROCEDURE — 93005 ELECTROCARDIOGRAM TRACING: CPT

## 2022-07-25 PROCEDURE — 93010 EKG 12-LEAD: ICD-10-PCS | Mod: ,,, | Performed by: INTERNAL MEDICINE

## 2022-07-25 PROCEDURE — 25000003 PHARM REV CODE 250

## 2022-07-25 RX ORDER — RIVAROXABAN 20 MG/1
1 TABLET, FILM COATED ORAL DAILY
COMMUNITY
Start: 2022-07-22 | End: 2023-02-27

## 2022-07-25 RX ORDER — GLYCOPYRROLATE 0.2 MG/ML
INJECTION INTRAMUSCULAR; INTRAVENOUS
Status: DISCONTINUED | OUTPATIENT
Start: 2022-07-25 | End: 2022-07-25

## 2022-07-25 RX ORDER — MULTIVITAMIN
1 TABLET ORAL DAILY
Status: ON HOLD | COMMUNITY
End: 2022-12-08

## 2022-07-25 RX ORDER — METOPROLOL SUCCINATE 25 MG/1
25 TABLET, EXTENDED RELEASE ORAL 2 TIMES DAILY
COMMUNITY
Start: 2022-06-02 | End: 2023-02-28 | Stop reason: SDUPTHER

## 2022-07-25 RX ORDER — VIT C/E/ZN/COPPR/LUTEIN/ZEAXAN 250MG-90MG
2000 CAPSULE ORAL DAILY
COMMUNITY

## 2022-07-25 RX ORDER — PROPOFOL 10 MG/ML
VIAL (ML) INTRAVENOUS
Status: DISCONTINUED | OUTPATIENT
Start: 2022-07-25 | End: 2022-07-25

## 2022-07-25 RX ADMIN — GLYCOPYRROLATE 0.1 MG: 0.2 INJECTION INTRAMUSCULAR; INTRAVENOUS at 09:07

## 2022-07-25 RX ADMIN — PROPOFOL 70 MG: 10 INJECTION, EMULSION INTRAVENOUS at 09:07

## 2022-07-25 RX ADMIN — PROPOFOL 40 MG: 10 INJECTION, EMULSION INTRAVENOUS at 10:07

## 2022-07-25 RX ADMIN — SODIUM CHLORIDE, SODIUM GLUCONATE, SODIUM ACETATE, POTASSIUM CHLORIDE AND MAGNESIUM CHLORIDE: 526; 502; 368; 37; 30 INJECTION, SOLUTION INTRAVENOUS at 09:07

## 2022-07-25 RX ADMIN — PROPOFOL 50 MG: 10 INJECTION, EMULSION INTRAVENOUS at 09:07

## 2022-07-25 NOTE — TRANSFER OF CARE
"Anesthesia Transfer of Care Note    Patient: Serge Prado    Procedure(s) Performed: * No procedures listed *    Patient location: Cath Lab    Anesthesia Type: general    Transport from OR: Transported from OR on room air with adequate spontaneous ventilation    Post pain: adequate analgesia    Post assessment: no apparent anesthetic complications    Post vital signs: stable    Level of consciousness: awake and alert    Nausea/Vomiting: no nausea/vomiting    Complications: none    Transfer of care protocol was followed      Last vitals:   Visit Vitals  Ht 5' 9" (1.753 m)   Wt 119.8 kg (264 lb 1.8 oz)   BMI 39.00 kg/m²     "

## 2022-07-25 NOTE — ANESTHESIA PREPROCEDURE EVALUATION
07/25/2022  Serge Prado is a 66 y.o., male   Pre-operative evaluation for * No procedures listed *    BP (!) 146/82   Pulse 87   Temp 37.1 °C (98.8 °F) (Oral)   SpO2 97%     Patient Active Problem List   Diagnosis    Disease due to severe acute respiratory syndrome coronavirus 2 (SARS-CoV-2)    Hemorrhoids    Hyperlipidemia    Hypertension    Low back pain    Obesity    Severe acute respiratory syndrome coronavirus 2 (SARS-CoV-2) vaccination not indicated    Vitamin D deficiency       Review of patient's allergies indicates:   Allergen Reactions    Morphine Rash       Current Outpatient Medications   Medication Instructions    amlodipine-olmesartan (ANGI) 10-40 mg per tablet Take 1 tablet by mouth once daily    cholecalciferol (vitamin D3) (VITAMIN D3) 1,000 Units, Oral, Daily, 2 capsules    hydroCHLOROthiazide (HYDRODIURIL) 25 MG tablet Take 1 tablet by mouth once daily    metoprolol succinate (TOPROL-XL) 25 mg, Oral, 2 times daily    multivitamin (THERAGRAN) per tablet 1 tablet, Oral, Daily    XARELTO 20 mg Tab 1 tablet, Oral, Daily       Past Surgical History:   Procedure Laterality Date    BACK SURGERY      SHOULDER ARTHROSCOPY Right        Social History     Socioeconomic History    Marital status:    Tobacco Use    Smoking status: Never Smoker    Smokeless tobacco: Never Used   Substance and Sexual Activity    Alcohol use: Yes     Alcohol/week: 2.0 standard drinks     Types: 2 Cans of beer per week    Drug use: Never    Sexual activity: Yes       Lab Results   Component Value Date    WBC 6.5 07/18/2022    HGB 13.5 (L) 07/18/2022    HCT 40.7 (L) 07/18/2022    .2 (H) 07/18/2022     07/18/2022          BMP  Lab Results   Component Value Date     07/18/2022    K 4.6 07/18/2022    CHLORIDE 104 07/18/2022    CO2 23 07/18/2022    GLUCOSE 112 07/18/2022     BUN 16.8 07/18/2022    CREATININE 1.02 07/18/2022    CALCIUM 9.8 07/18/2022    EGFRNONAA >60 07/18/2022        INR  No results for input(s): PT, INR, PROTIME, APTT in the last 72 hours.        Diagnostic Studies:      EKG:  Results for orders placed or performed in visit on 05/31/22   EKG 12-lead    Collection Time: 05/31/22  6:43 AM    Narrative    Test Reason :     Vent. Rate : 101 BPM     Atrial Rate : 192 BPM     P-R Int : 000 ms          QRS Dur : 092 ms      QT Int : 354 ms       P-R-T Axes : 000 -17 041 degrees     QTc Int : 459 ms    Atrial fibrillation with rapid ventricular response  Abnormal ECG  No previous ECGs available  Confirmed by Reggie Ferro MD ([id number]) on 6/1/2022 8:52:04 AM    Referred By: CARLOS ALBERTO   SELF           Confirmed By:Reggie Ferro MD       .      Pre-op Assessment          Review of Systems  Anesthesia Hx:  No problems with previous Anesthesia  Denies Family Hx of Anesthesia complications.    Cardiovascular:  Cardiovascular Normal  No Cardiac Complaints   Pulmonary:  Pulmonary Normal No Pulmonary Complaints   Hepatic/GI:   No Current GERD Sx       Physical Exam  General: Alert and Oriented    Airway:  Mallampati: II   Mouth Opening: Normal  TM Distance: Normal  Tongue: Normal  Neck ROM: Normal ROM    Dental:  Intact    Chest/Lungs:  Clear to auscultation, Normal Respiratory Rate    Heart:  Rate: Normal  Rhythm: Irregularly Irregular        Anesthesia Plan  Type of Anesthesia, risks & benefits discussed:    Anesthesia Type: Gen Natural Airway  Intra-op Monitoring Plan: Standard ASA Monitors  Post Op Pain Control Plan: multimodal analgesia  Induction:  IV  Airway Plan: Direct  Informed Consent: Informed consent signed with the Patient and all parties understand the risks and agree with anesthesia plan.  All questions answered. Patient consented to blood products? No  ASA Score: 3  Day of Surgery Review of History & Physical: H&P Update referred to the surgeon/provider.  Anesthesia Plan  Notes: Nasal cannula vs facemask supplemental oxygenation   For patients with JASON/obesity, may consider SuperNoval Nasal CPAP      Ready For Surgery From Anesthesia Perspective.     .

## 2022-07-25 NOTE — ANESTHESIA POSTPROCEDURE EVALUATION
Anesthesia Post Evaluation    Patient: Serge Prado    Procedure(s) Performed: * No procedures listed *    Final Anesthesia Type: general      Patient location during evaluation: PACU  Patient participation: Yes- Able to Participate  Level of consciousness: awake  Post-procedure vital signs: reviewed and stable  Pain management: adequate  Airway patency: patent    PONV status at discharge: vomiting (controlled) and nausea (inadequately controlled)  Anesthetic complications: no      Cardiovascular status: hemodynamically stable  Respiratory status: spontaneous ventilation and unassisted  Hydration status: euvolemic  Follow-up not needed.  Comments:              Vitals Value Taken Time   /60 07/25/22 1030   Temp  07/25/22 1050   Pulse 64 07/25/22 1030   Resp 21 07/25/22 1030   SpO2 98 % 07/25/22 1030         No case tracking events are documented in the log.      Pain/Eyal Score: Eyal Score: 9 (7/25/2022 10:15 AM)

## 2022-07-25 NOTE — Clinical Note
The KETTY probe was inserted.    I personally performed the service described in the documentation recorded by the scribe in my presence, and it accurately and completely records my words and actions.

## 2022-08-04 NOTE — DISCHARGE SUMMARY
* No procedures listed *    OUTCOME: Patient tolerated treatment/procedure well without complication and is now ready for discharge.    DISPOSITION: Home or Self Care    FOLLOWUP: In clinic    DISCHARGE INSTRUCTIONS: No discharge procedures on file.    TIME SPENT ON DISCHARGE:   31 minutes

## 2022-12-01 ENCOUNTER — LAB VISIT (OUTPATIENT)
Dept: LAB | Facility: HOSPITAL | Age: 67
End: 2022-12-01
Attending: INTERNAL MEDICINE
Payer: COMMERCIAL

## 2022-12-01 DIAGNOSIS — I10 ESSENTIAL HYPERTENSION, MALIGNANT: ICD-10-CM

## 2022-12-01 DIAGNOSIS — I48.19 PERSISTENT ATRIAL FIBRILLATION: Primary | ICD-10-CM

## 2022-12-01 LAB
INR BLD: 1.39 (ref 0–1.3)
MAGNESIUM SERPL-MCNC: 2.3 MG/DL (ref 1.6–2.6)
PROTHROMBIN TIME: 13.8 SECONDS (ref 12.5–14.5)

## 2022-12-01 PROCEDURE — 83735 ASSAY OF MAGNESIUM: CPT

## 2022-12-01 PROCEDURE — 85610 PROTHROMBIN TIME: CPT

## 2022-12-01 PROCEDURE — 36415 COLL VENOUS BLD VENIPUNCTURE: CPT

## 2022-12-05 ENCOUNTER — PATIENT MESSAGE (OUTPATIENT)
Dept: ADMINISTRATIVE | Facility: OTHER | Age: 67
End: 2022-12-05
Payer: COMMERCIAL

## 2022-12-06 ENCOUNTER — PATIENT MESSAGE (OUTPATIENT)
Dept: ADMINISTRATIVE | Facility: OTHER | Age: 67
End: 2022-12-06
Payer: COMMERCIAL

## 2022-12-07 ENCOUNTER — LAB VISIT (OUTPATIENT)
Dept: LAB | Facility: HOSPITAL | Age: 67
End: 2022-12-07
Attending: INTERNAL MEDICINE
Payer: COMMERCIAL

## 2022-12-07 ENCOUNTER — PATIENT MESSAGE (OUTPATIENT)
Dept: ADMINISTRATIVE | Facility: OTHER | Age: 67
End: 2022-12-07
Payer: COMMERCIAL

## 2022-12-07 DIAGNOSIS — I48.91 A-FIB: ICD-10-CM

## 2022-12-07 DIAGNOSIS — L85.9 HLP (HYPERKERATOSIS LENTICULARIS PERSTANS): ICD-10-CM

## 2022-12-07 DIAGNOSIS — I10 HYPERTENSION, UNSPECIFIED TYPE: Primary | ICD-10-CM

## 2022-12-07 LAB
ANION GAP SERPL CALC-SCNC: 11 MEQ/L
BUN SERPL-MCNC: 15.6 MG/DL (ref 8.4–25.7)
CALCIUM SERPL-MCNC: 9.6 MG/DL (ref 8.8–10)
CHLORIDE SERPL-SCNC: 104 MMOL/L (ref 98–107)
CO2 SERPL-SCNC: 21 MMOL/L (ref 23–31)
CREAT SERPL-MCNC: 0.93 MG/DL (ref 0.73–1.18)
CREAT/UREA NIT SERPL: 17
ERYTHROCYTE [DISTWIDTH] IN BLOOD BY AUTOMATED COUNT: 11.8 % (ref 11.5–17)
GFR SERPLBLD CREATININE-BSD FMLA CKD-EPI: >60 MLS/MIN/1.73/M2
GLUCOSE SERPL-MCNC: 109 MG/DL (ref 82–115)
HCT VFR BLD AUTO: 42 % (ref 42–52)
HGB BLD-MCNC: 14.2 GM/DL (ref 14–18)
MCH RBC QN AUTO: 33.6 PG (ref 27–31)
MCHC RBC AUTO-ENTMCNC: 33.8 MG/DL (ref 33–36)
MCV RBC AUTO: 99.3 FL (ref 80–94)
PLATELET # BLD AUTO: 256 X10(3)/MCL (ref 130–400)
PMV BLD AUTO: 9.9 FL (ref 7.4–10.4)
POTASSIUM SERPL-SCNC: 4.5 MMOL/L (ref 3.5–5.1)
RBC # BLD AUTO: 4.23 X10(6)/MCL (ref 4.7–6.1)
SODIUM SERPL-SCNC: 136 MMOL/L (ref 136–145)
WBC # SPEC AUTO: 7 X10(3)/MCL (ref 4.5–11.5)

## 2022-12-07 PROCEDURE — 80048 BASIC METABOLIC PNL TOTAL CA: CPT

## 2022-12-07 PROCEDURE — 36415 COLL VENOUS BLD VENIPUNCTURE: CPT

## 2022-12-07 PROCEDURE — 85027 COMPLETE CBC AUTOMATED: CPT

## 2022-12-08 ENCOUNTER — HOSPITAL ENCOUNTER (OUTPATIENT)
Dept: CARDIOLOGY | Facility: HOSPITAL | Age: 67
Discharge: HOME | End: 2022-12-08
Attending: INTERNAL MEDICINE
Payer: COMMERCIAL

## 2022-12-08 ENCOUNTER — ANESTHESIA EVENT (OUTPATIENT)
Dept: CARDIOLOGY | Facility: HOSPITAL | Age: 67
End: 2022-12-08
Payer: COMMERCIAL

## 2022-12-08 ENCOUNTER — HOSPITAL ENCOUNTER (OUTPATIENT)
Facility: HOSPITAL | Age: 67
Discharge: HOME | End: 2022-12-08
Attending: INTERNAL MEDICINE | Admitting: INTERNAL MEDICINE
Payer: COMMERCIAL

## 2022-12-08 ENCOUNTER — ANESTHESIA (OUTPATIENT)
Dept: CARDIOLOGY | Facility: HOSPITAL | Age: 67
End: 2022-12-08
Payer: COMMERCIAL

## 2022-12-08 VITALS
SYSTOLIC BLOOD PRESSURE: 128 MMHG | OXYGEN SATURATION: 96 % | WEIGHT: 260.13 LBS | RESPIRATION RATE: 13 BRPM | DIASTOLIC BLOOD PRESSURE: 70 MMHG | HEIGHT: 69 IN | HEART RATE: 79 BPM | TEMPERATURE: 98 F | BODY MASS INDEX: 38.53 KG/M2

## 2022-12-08 DIAGNOSIS — I48.19 PERSISTENT ATRIAL FIBRILLATION: ICD-10-CM

## 2022-12-08 DIAGNOSIS — Z98.890 H/O CARDIAC RADIOFREQUENCY ABLATION: ICD-10-CM

## 2022-12-08 DIAGNOSIS — I48.20 CHRONIC A-FIB: ICD-10-CM

## 2022-12-08 DIAGNOSIS — I48.19 PERSISTENT ATRIAL FIBRILLATION: Primary | ICD-10-CM

## 2022-12-08 LAB — BSA FOR ECHO PROCEDURE: 2.4 M2

## 2022-12-08 PROCEDURE — 27201423 OPTIME MED/SURG SUP & DEVICES STERILE SUPPLY: Performed by: INTERNAL MEDICINE

## 2022-12-08 PROCEDURE — 93656 COMPRE EP EVAL ABLTJ ATR FIB: CPT | Performed by: INTERNAL MEDICINE

## 2022-12-08 PROCEDURE — 93010 EKG 12-LEAD: ICD-10-PCS | Mod: ,,, | Performed by: INTERNAL MEDICINE

## 2022-12-08 PROCEDURE — 51702 INSERT TEMP BLADDER CATH: CPT

## 2022-12-08 PROCEDURE — 92960 CARDIOVERSION ELECTRIC EXT: CPT | Mod: 59 | Performed by: INTERNAL MEDICINE

## 2022-12-08 PROCEDURE — 25000003 PHARM REV CODE 250: Performed by: INTERNAL MEDICINE

## 2022-12-08 PROCEDURE — 93005 ELECTROCARDIOGRAM TRACING: CPT

## 2022-12-08 PROCEDURE — 37000008 HC ANESTHESIA 1ST 15 MINUTES: Performed by: INTERNAL MEDICINE

## 2022-12-08 PROCEDURE — 93312 ECHO TRANSESOPHAGEAL: CPT

## 2022-12-08 PROCEDURE — 63600175 PHARM REV CODE 636 W HCPCS: Performed by: STUDENT IN AN ORGANIZED HEALTH CARE EDUCATION/TRAINING PROGRAM

## 2022-12-08 PROCEDURE — C1760 CLOSURE DEV, VASC: HCPCS | Performed by: INTERNAL MEDICINE

## 2022-12-08 PROCEDURE — 36620 INSERTION CATHETER ARTERY: CPT | Performed by: STUDENT IN AN ORGANIZED HEALTH CARE EDUCATION/TRAINING PROGRAM

## 2022-12-08 PROCEDURE — 63600175 PHARM REV CODE 636 W HCPCS: Performed by: INTERNAL MEDICINE

## 2022-12-08 PROCEDURE — 25000003 PHARM REV CODE 250: Performed by: STUDENT IN AN ORGANIZED HEALTH CARE EDUCATION/TRAINING PROGRAM

## 2022-12-08 PROCEDURE — C2630 CATH, EP, COOL-TIP: HCPCS | Performed by: INTERNAL MEDICINE

## 2022-12-08 PROCEDURE — C1730 CATH, EP, 19 OR FEW ELECT: HCPCS | Performed by: INTERNAL MEDICINE

## 2022-12-08 PROCEDURE — C1894 INTRO/SHEATH, NON-LASER: HCPCS | Performed by: INTERNAL MEDICINE

## 2022-12-08 PROCEDURE — C1753 CATH, INTRAVAS ULTRASOUND: HCPCS | Performed by: INTERNAL MEDICINE

## 2022-12-08 PROCEDURE — 93010 ELECTROCARDIOGRAM REPORT: CPT | Mod: ,,, | Performed by: INTERNAL MEDICINE

## 2022-12-08 PROCEDURE — 37000009 HC ANESTHESIA EA ADD 15 MINS: Performed by: INTERNAL MEDICINE

## 2022-12-08 DEVICE — SYS CLSR VASCADE MVP ID6-12FR: Type: IMPLANTABLE DEVICE | Site: GROIN | Status: FUNCTIONAL

## 2022-12-08 RX ORDER — ROCURONIUM BROMIDE 10 MG/ML
INJECTION, SOLUTION INTRAVENOUS
Status: DISCONTINUED | OUTPATIENT
Start: 2022-12-08 | End: 2022-12-08

## 2022-12-08 RX ORDER — ENOXAPARIN SODIUM 150 MG/ML
120 INJECTION SUBCUTANEOUS 2 TIMES DAILY
Status: ON HOLD | COMMUNITY
Start: 2022-11-11 | End: 2022-12-08 | Stop reason: HOSPADM

## 2022-12-08 RX ORDER — ADENOSINE 3 MG/ML
INJECTION, SOLUTION INTRAVENOUS
Status: DISCONTINUED | OUTPATIENT
Start: 2022-12-08 | End: 2022-12-08 | Stop reason: HOSPADM

## 2022-12-08 RX ORDER — HYDROCODONE BITARTRATE AND ACETAMINOPHEN 10; 325 MG/1; MG/1
1 TABLET ORAL EVERY 4 HOURS PRN
Status: DISCONTINUED | OUTPATIENT
Start: 2022-12-08 | End: 2022-12-08 | Stop reason: HOSPADM

## 2022-12-08 RX ORDER — PANTOPRAZOLE SODIUM 40 MG/1
40 TABLET, DELAYED RELEASE ORAL DAILY
Qty: 30 TABLET | Refills: 0 | Status: SHIPPED | OUTPATIENT
Start: 2022-12-08 | End: 2023-02-28 | Stop reason: SDUPTHER

## 2022-12-08 RX ORDER — KETOROLAC TROMETHAMINE 30 MG/ML
15 INJECTION, SOLUTION INTRAMUSCULAR; INTRAVENOUS EVERY 6 HOURS PRN
Status: DISCONTINUED | OUTPATIENT
Start: 2022-12-08 | End: 2022-12-08 | Stop reason: HOSPADM

## 2022-12-08 RX ORDER — FLECAINIDE ACETATE 50 MG/1
50 TABLET ORAL 2 TIMES DAILY
COMMUNITY
End: 2023-05-22

## 2022-12-08 RX ORDER — ACETAMINOPHEN 325 MG/1
650 TABLET ORAL EVERY 4 HOURS PRN
Status: DISCONTINUED | OUTPATIENT
Start: 2022-12-08 | End: 2022-12-09 | Stop reason: HOSPADM

## 2022-12-08 RX ORDER — MIDAZOLAM HYDROCHLORIDE 1 MG/ML
INJECTION INTRAMUSCULAR; INTRAVENOUS
Status: DISCONTINUED | OUTPATIENT
Start: 2022-12-08 | End: 2022-12-08

## 2022-12-08 RX ORDER — KETOROLAC TROMETHAMINE 30 MG/ML
15 INJECTION, SOLUTION INTRAMUSCULAR; INTRAVENOUS EVERY 6 HOURS PRN
Status: DISCONTINUED | OUTPATIENT
Start: 2022-12-08 | End: 2022-12-09 | Stop reason: HOSPADM

## 2022-12-08 RX ORDER — DEXAMETHASONE SODIUM PHOSPHATE 4 MG/ML
INJECTION, SOLUTION INTRA-ARTICULAR; INTRALESIONAL; INTRAMUSCULAR; INTRAVENOUS; SOFT TISSUE
Status: DISCONTINUED | OUTPATIENT
Start: 2022-12-08 | End: 2022-12-08

## 2022-12-08 RX ORDER — FENTANYL CITRATE 50 UG/ML
INJECTION, SOLUTION INTRAMUSCULAR; INTRAVENOUS
Status: DISCONTINUED | OUTPATIENT
Start: 2022-12-08 | End: 2022-12-08

## 2022-12-08 RX ORDER — HYDROCODONE BITARTRATE AND ACETAMINOPHEN 10; 325 MG/1; MG/1
1 TABLET ORAL EVERY 4 HOURS PRN
Status: DISCONTINUED | OUTPATIENT
Start: 2022-12-08 | End: 2022-12-09 | Stop reason: HOSPADM

## 2022-12-08 RX ORDER — LIDOCAINE HYDROCHLORIDE 10 MG/ML
INJECTION INFILTRATION; PERINEURAL
Status: DISCONTINUED | OUTPATIENT
Start: 2022-12-08 | End: 2022-12-08 | Stop reason: HOSPADM

## 2022-12-08 RX ORDER — SODIUM CHLORIDE, SODIUM GLUCONATE, SODIUM ACETATE, POTASSIUM CHLORIDE AND MAGNESIUM CHLORIDE 30; 37; 368; 526; 502 MG/100ML; MG/100ML; MG/100ML; MG/100ML; MG/100ML
INJECTION, SOLUTION INTRAVENOUS CONTINUOUS
Status: CANCELLED | OUTPATIENT
Start: 2022-12-08 | End: 2023-01-07

## 2022-12-08 RX ORDER — ACETAMINOPHEN 325 MG/1
650 TABLET ORAL EVERY 4 HOURS PRN
Status: DISCONTINUED | OUTPATIENT
Start: 2022-12-08 | End: 2022-12-08 | Stop reason: HOSPADM

## 2022-12-08 RX ORDER — LIDOCAINE HYDROCHLORIDE 20 MG/ML
INJECTION, SOLUTION EPIDURAL; INFILTRATION; INTRACAUDAL; PERINEURAL
Status: DISCONTINUED | OUTPATIENT
Start: 2022-12-08 | End: 2022-12-08

## 2022-12-08 RX ORDER — HEPARIN SODIUM 1000 [USP'U]/ML
INJECTION, SOLUTION INTRAVENOUS; SUBCUTANEOUS
Status: DISCONTINUED | OUTPATIENT
Start: 2022-12-08 | End: 2022-12-08

## 2022-12-08 RX ORDER — PROPOFOL 10 MG/ML
VIAL (ML) INTRAVENOUS
Status: DISCONTINUED | OUTPATIENT
Start: 2022-12-08 | End: 2022-12-08

## 2022-12-08 RX ORDER — FUROSEMIDE 10 MG/ML
INJECTION INTRAMUSCULAR; INTRAVENOUS
Status: DISCONTINUED | OUTPATIENT
Start: 2022-12-08 | End: 2022-12-08

## 2022-12-08 RX ORDER — LIDOCAINE HYDROCHLORIDE 10 MG/ML
1 INJECTION, SOLUTION EPIDURAL; INFILTRATION; INTRACAUDAL; PERINEURAL ONCE
Status: CANCELLED | OUTPATIENT
Start: 2022-12-08 | End: 2022-12-08

## 2022-12-08 RX ORDER — ONDANSETRON HYDROCHLORIDE 2 MG/ML
INJECTION, SOLUTION INTRAMUSCULAR; INTRAVENOUS
Status: DISCONTINUED | OUTPATIENT
Start: 2022-12-08 | End: 2022-12-08

## 2022-12-08 RX ORDER — EPHEDRINE SULFATE 50 MG/ML
INJECTION, SOLUTION INTRAVENOUS
Status: DISCONTINUED | OUTPATIENT
Start: 2022-12-08 | End: 2022-12-08

## 2022-12-08 RX ORDER — PHENYLEPHRINE HCL IN 0.9% NACL 1 MG/10 ML
SYRINGE (ML) INTRAVENOUS
Status: DISCONTINUED | OUTPATIENT
Start: 2022-12-08 | End: 2022-12-08

## 2022-12-08 RX ORDER — PROTAMINE SULFATE 10 MG/ML
INJECTION, SOLUTION INTRAVENOUS
Status: DISCONTINUED | OUTPATIENT
Start: 2022-12-08 | End: 2022-12-08

## 2022-12-08 RX ADMIN — EPHEDRINE SULFATE 10 MG: 50 INJECTION INTRAVENOUS at 08:12

## 2022-12-08 RX ADMIN — EPHEDRINE SULFATE 10 MG: 50 INJECTION INTRAVENOUS at 10:12

## 2022-12-08 RX ADMIN — EPHEDRINE SULFATE 10 MG: 50 INJECTION INTRAVENOUS at 09:12

## 2022-12-08 RX ADMIN — FUROSEMIDE 20 MG: 10 INJECTION, SOLUTION INTRAMUSCULAR; INTRAVENOUS at 11:12

## 2022-12-08 RX ADMIN — ONDANSETRON 4 MG: 2 INJECTION INTRAMUSCULAR; INTRAVENOUS at 11:12

## 2022-12-08 RX ADMIN — HEPARIN SODIUM 1000 UNITS/HR: 1000 INJECTION, SOLUTION INTRAVENOUS; SUBCUTANEOUS at 08:12

## 2022-12-08 RX ADMIN — ROCURONIUM BROMIDE 50 MG: 50 INJECTION INTRAVENOUS at 07:12

## 2022-12-08 RX ADMIN — SUGAMMADEX 200 MG: 100 INJECTION, SOLUTION INTRAVENOUS at 11:12

## 2022-12-08 RX ADMIN — PHENYLEPHRINE HYDROCHLORIDE 25 MCG/MIN: 10 INJECTION INTRAVENOUS at 07:12

## 2022-12-08 RX ADMIN — PROPOFOL 200 MG: 10 INJECTION, EMULSION INTRAVENOUS at 07:12

## 2022-12-08 RX ADMIN — Medication 100 MCG: at 07:12

## 2022-12-08 RX ADMIN — EPHEDRINE SULFATE 10 MG: 50 INJECTION INTRAVENOUS at 11:12

## 2022-12-08 RX ADMIN — ROCURONIUM BROMIDE 20 MG: 50 INJECTION INTRAVENOUS at 08:12

## 2022-12-08 RX ADMIN — ROCURONIUM BROMIDE 10 MG: 50 INJECTION INTRAVENOUS at 10:12

## 2022-12-08 RX ADMIN — DEXAMETHASONE SODIUM PHOSPHATE 4 MG: 4 INJECTION, SOLUTION INTRA-ARTICULAR; INTRALESIONAL; INTRAMUSCULAR; INTRAVENOUS; SOFT TISSUE at 08:12

## 2022-12-08 RX ADMIN — LIDOCAINE HYDROCHLORIDE 80 MG: 20 INJECTION, SOLUTION EPIDURAL; INFILTRATION; INTRACAUDAL; PERINEURAL at 07:12

## 2022-12-08 RX ADMIN — FENTANYL CITRATE 100 MCG: 50 INJECTION, SOLUTION INTRAMUSCULAR; INTRAVENOUS at 07:12

## 2022-12-08 RX ADMIN — ROCURONIUM BROMIDE 20 MG: 50 INJECTION INTRAVENOUS at 09:12

## 2022-12-08 RX ADMIN — HEPARIN SODIUM 6000 UNITS: 1000 INJECTION, SOLUTION INTRAVENOUS; SUBCUTANEOUS at 08:12

## 2022-12-08 RX ADMIN — PROTAMINE SULFATE 40 MG: 10 INJECTION, SOLUTION INTRAVENOUS at 11:12

## 2022-12-08 RX ADMIN — MIDAZOLAM HYDROCHLORIDE 2 MG: 1 INJECTION, SOLUTION INTRAMUSCULAR; INTRAVENOUS at 07:12

## 2022-12-08 RX ADMIN — SODIUM CHLORIDE, SODIUM GLUCONATE, SODIUM ACETATE, POTASSIUM CHLORIDE AND MAGNESIUM CHLORIDE: 526; 502; 368; 37; 30 INJECTION, SOLUTION INTRAVENOUS at 07:12

## 2022-12-08 NOTE — Clinical Note
A dressing was the right femoral vein puncture site. All four femoral venous access sites covered with sterile gauze and tegaderm.

## 2022-12-08 NOTE — ANESTHESIA PROCEDURE NOTES
Peripheral IV Insertion    Diagnosis: I99.8 Other disorder of circulatory system    Patient location during procedure: OR  Procedure start time: 12/8/2022 7:45 AM  Timeout: 12/8/2022 7:44 AM  Procedure end time: 12/8/2022 7:45 AM    Staffing  Authorizing Provider: Theresa Mccarthy CRNA  Performing Provider: Theresa Mccarthy CRNA    Anesthesiologist was present at the time of the procedure.    Preanesthetic Checklist  Completed: patient identified, IV checked, site marked, risks and benefits discussed, surgical consent, monitors and equipment checked, pre-op evaluation, timeout performed and anesthesia consent givenPeripheral IV Insertion  Skin Prep: alcohol swabs  Local Infiltration: none  Orientation: left  Location: forearm  Catheter Type: peripheral IV (single lumen)  Catheter Size: 20 G  Catheter placement by Anatomical landmarks. Heme positive aspiration all ports. Insertion Attempts: 1  Assessment  Dressing: secured with tape and tegaderm  Patient: Tolerated well  Line flushed easily.

## 2022-12-08 NOTE — Clinical Note
The closure device was deployed in the right femoral vein. All four femoral venous access sites closed with Vascade MVP.

## 2022-12-08 NOTE — ANESTHESIA PROCEDURE NOTES
Intubation    Date/Time: 12/8/2022 7:36 AM  Performed by: Theresa Mccarthy CRNA  Authorized by: Theresa Mccarthy CRNA     Intubation:     Induction:  Intravenous    Intubated:  Postinduction    Mask Ventilation:  Easy mask    Attempts:  2    Attempted By:  CRNA    Method of Intubation:  Direct    Blade:  Garcia 2    Laryngeal View Grade: Grade IIb - only the arytenoids and epiglottis seen      Attempted By (2nd Attempt):  CRNA    Method of Intubation (2nd Attempt):  Video laryngoscopy    Blade (2nd Attempt):  Segundo 4    Laryngeal View Grade (2nd Attempt): Grade I - full view of cords      Difficult Airway Encountered?: No      Complications:  None    Airway Device:  Oral endotracheal tube    Airway Device Size:  7.5    Style/Cuff Inflation:  Cuffed (inflated to minimal occlusive pressure)    Inflation Amount (mL):  6    Tube secured:  22    Secured at:  The lips    Placement Verified By:  Capnometry    Complicating Factors:  None    Findings Post-Intubation:  BS equal bilateral and atraumatic/condition of teeth unchanged

## 2022-12-08 NOTE — DISCHARGE SUMMARY
[facility] - Cath Lab Services  Discharge Note  Short Stay    Procedure(s) (LRB):  ABLATION (N/A)  ECHOCARDIOGRAM,TRANSESOPHAGEAL (N/A)      OUTCOME: Patient tolerated treatment/procedure well without complication and is now ready for discharge.    DISPOSITION: Home or Self Care    FINAL DIAGNOSIS:  <principal problem not specified>    FOLLOWUP: In clinic    DISCHARGE INSTRUCTIONS:    Discharge Procedure Orders   Diet Cardiac     Lifting restrictions   Order Comments: Do not lift > 15 pounds.     Remove dressing in 24 hours     Notify your health care provider if you experience any of the following:  temperature >100.4     Notify your health care provider if you experience any of the following:  persistent nausea and vomiting or diarrhea     Notify your health care provider if you experience any of the following:  severe uncontrolled pain     Notify your health care provider if you experience any of the following:  redness, tenderness, or signs of infection (pain, swelling, redness, odor or green/yellow discharge around incision site)        TIME SPENT ON DISCHARGE: 15 minutes

## 2022-12-08 NOTE — ANESTHESIA POSTPROCEDURE EVALUATION
Anesthesia Post Evaluation    Patient: Serge Prado    Procedure(s) Performed: Procedure(s) (LRB):  ABLATION (N/A)  ECHOCARDIOGRAM,TRANSESOPHAGEAL (N/A)    Final Anesthesia Type: general (/Regional//MAC)      Patient location during evaluation: PACU  Post-procedure mental status: @ basline.  Post-procedure vital signs: reviewed and stable  Pain management: adequate    PONV status: See postop meds for drugs used to control n/v if any.  Anesthetic complications: no      Cardiovascular status: blood pressure returned to baseline  Respiratory status: @ baseline.  Hydration status: euvolemic            Vitals Value Taken Time   /70 12/08/22 1416   Temp 36.6 °C (97.8 °F) 12/08/22 1203   Pulse 77 12/08/22 1423   Resp 18 12/08/22 1423   SpO2 96 % 12/08/22 1423   Vitals shown include unvalidated device data.      Event Time   Out of Recovery 12:51:00         Pain/Eyal Score: Eyal Score: 10 (12/8/2022 12:50 PM)

## 2022-12-08 NOTE — ANESTHESIA PROCEDURE NOTES
Arterial    Diagnosis: atrial fibrillation    Patient location during procedure: done out of OR  Procedure start time: 12/8/2022 7:01 AM  Timeout: 12/8/2022 7:00 AM  Procedure end time: 12/8/2022 7:10 AM    Staffing  Authorizing Provider: Theresa Mccarthy CRNA  Performing Provider: Theresa Mccarthy CRNA    Anesthesiologist was present at the time of the procedure.    Preanesthetic Checklist  Completed: patient identified, IV checked, site marked, risks and benefits discussed, surgical consent, monitors and equipment checked, pre-op evaluation, timeout performed and anesthesia consent givenArterial  Skin Prep: chlorhexidine gluconate  Local Infiltration: lidocaine  Orientation: right  Location: radial    Catheter Size: 20 G  Catheter placement by Anatomical landmarks. Heme positive aspiration all ports. Insertion Attempts: 2  Assessment  Dressing: secured with tape and tegaderm  Patient: Tolerated well

## 2022-12-08 NOTE — TRANSFER OF CARE
"Anesthesia Transfer of Care Note    Patient: Serge Prado    Procedure(s) Performed: Procedure(s) (LRB):  ABLATION (N/A)  ECHOCARDIOGRAM,TRANSESOPHAGEAL (N/A)    Patient location: PACU    Anesthesia Type: general    Transport from OR: Transported from OR on room air with adequate spontaneous ventilation    Post pain: adequate analgesia    Post assessment: no apparent anesthetic complications    Post vital signs: stable    Level of consciousness: awake, alert and sedated    Nausea/Vomiting: no nausea/vomiting    Complications: none    Transfer of care protocol was followed      Last vitals:   Visit Vitals  BP (!) 149/74   Pulse 79   Temp 36.6 °C (97.8 °F) (Tympanic)   Ht 5' 9" (1.753 m)   Wt 118 kg (260 lb 2.3 oz)   SpO2 95%   BMI 38.42 kg/m²     "

## 2022-12-08 NOTE — DISCHARGE INSTRUCTIONS
Go to ER if unusual symptoms occur. Remove dressings in 24 hours, may also shower at this time. Do not rub sites, pat dry. No heavy lifting (greater than 5-10 pounds), excessive bending, or tub baths x 5 days. NO driving x 24 hours. Start Protonix 40 mg daily x 1 month,  @ [facility] in [place]. Resume Xarelto tonight.

## 2022-12-08 NOTE — Clinical Note
The sheath was exchanged in the left femoral vein. Left femoral venous 10fr 23cm sheath exchanged for 10fr 11cm sheath.

## 2022-12-08 NOTE — ANESTHESIA PREPROCEDURE EVALUATION
"                                                                                                             12/08/2022  Serge Prado is a 67 y.o., male with obesity, h/o afib, htn, EF nl, denies sleep apnea and other medical problems listed in the EMR here for ablation      Pre-op Assessment    I have reviewed the Patient Summary Reports.     I have reviewed the Nursing Notes. I have reviewed the NPO Status.   I have reviewed the Medications.     Review of Systems      Physical Exam  General: Well nourished and Cooperative    Airway:  Mallampati: II   Mouth Opening: Normal  TM Distance: Normal  Tongue: Normal  Neck ROM: Normal ROM    Dental:  Intact    Chest/Lungs:  Clear to auscultation    Heart:  Rate: Normal        Anesthesia Plan  Type of Anesthesia, risks & benefits discussed:    Anesthesia Type: Gen ETT  Intra-op Monitoring Plan: Standard ASA Monitors and Art Line  Post Op Pain Control Plan: multimodal analgesia  Informed Consent: Informed consent signed with the Patient and all parties understand the risks and agree with anesthesia plan.  All questions answered.   ASA Score: 3  Day of Surgery Review of History & Physical: H&P Update referred to the surgeon/provider.    Ready For Surgery From Anesthesia Perspective.     .   I explained anesthesia plan to patient/responsbile party if available.  Anesthesia consent done going over the material facts, risks, complications & alternatives, obtained which includes the possibility of altering the anesthesia plan.  I reviewed problem list, appropriate labs, any workup, Xray, EKG etc noted below.  Patients condition is satisfactory to proceed with anesthesia plan unless otherwise noted (see anesthesia chart for details of the anesthesia plan carried out).      Pre-operative evaluation for Procedure(s) (LRB):  ABLATION (N/A)  ECHOCARDIOGRAM,TRANSESOPHAGEAL (N/A)    BP (!) 166/96   Pulse 87   Temp 37 °C (98.6 °F) (Oral)   Ht 5' 9" (1.753 m)   Wt 118 kg (260 lb " 2.3 oz)   SpO2 97%   BMI 38.42 kg/m²     Patient Active Problem List   Diagnosis    Disease due to severe acute respiratory syndrome coronavirus 2 (SARS-CoV-2)    Hemorrhoids    Hyperlipidemia    Hypertension    Low back pain    Obesity    Severe acute respiratory syndrome coronavirus 2 (SARS-CoV-2) vaccination not indicated    Vitamin D deficiency       Review of patient's allergies indicates:   Allergen Reactions    Morphine Rash       Current Outpatient Medications   Medication Instructions    amlodipine-olmesartan (ANGI) 10-40 mg per tablet Take 1 tablet by mouth once daily    cholecalciferol (vitamin D3) (VITAMIN D3) 2,000 Units, Oral, Daily    enoxaparin (LOVENOX) 120 mg, Subcutaneous, 2 times daily, Inject 1 injection twice a day on 12-7-22    flecainide (TAMBOCOR) 50 mg, Oral, 2 times daily    folic acid/multivit-min/lutein (CENTRUM SILVER ORAL) 1 tablet, Oral, Daily    hydroCHLOROthiazide (HYDRODIURIL) 25 MG tablet Take 1 tablet by mouth once daily    metoprolol succinate (TOPROL-XL) 25 mg, Oral, 2 times daily    XARELTO 20 mg Tab 1 tablet, Oral, Daily       Past Surgical History:   Procedure Laterality Date    BACK SURGERY      right knee repair Right     SHOULDER ARTHROSCOPY Right     KETTY/DCCV  07/2022       Social History     Socioeconomic History    Marital status:    Tobacco Use    Smoking status: Never    Smokeless tobacco: Never   Substance and Sexual Activity    Alcohol use: Yes     Alcohol/week: 2.0 standard drinks     Types: 2 Cans of beer per week     Comment: on weekends    Drug use: Never    Sexual activity: Yes     Partners: Female       Lab Results   Component Value Date    WBC 7.0 12/07/2022    HGB 14.2 12/07/2022    HCT 42.0 12/07/2022    MCV 99.3 (H) 12/07/2022     12/07/2022          BMP  Lab Results   Component Value Date    HCT 42.0 12/07/2022     12/07/2022    K 4.5 12/07/2022    BUN 15.6 12/07/2022    CREATININE 0.93 12/07/2022     CALCIUM 9.6 12/07/2022        INR  No results for input(s): PT, INR, PROTIME, APTT in the last 72 hours.        Diagnostic Studies:      EKG:  Results for orders placed or performed during the hospital encounter of 07/25/22   EKG 12-lead    Collection Time: 07/25/22  6:49 AM    Narrative    Test Reason : I48.91,    Vent. Rate : 091 BPM     Atrial Rate : 000 BPM     P-R Int : 000 ms          QRS Dur : 094 ms      QT Int : 382 ms       P-R-T Axes : 000 -11 039 degrees     QTc Int : 469 ms    Atrial fibrillation  Incomplete right bundle branch block  Abnormal ECG    Confirmed by Gino Aguilar MD ([id number]) on 7/25/2022 6:38:01 PM    Referred By: ANNA EMMANUEL           Confirmed By:Gino Aguilar MD

## 2022-12-10 ENCOUNTER — HOSPITAL ENCOUNTER (INPATIENT)
Facility: HOSPITAL | Age: 67
LOS: 3 days | Discharge: HOME | DRG: 280 | End: 2022-12-13
Attending: STUDENT IN AN ORGANIZED HEALTH CARE EDUCATION/TRAINING PROGRAM | Admitting: STUDENT IN AN ORGANIZED HEALTH CARE EDUCATION/TRAINING PROGRAM
Payer: COMMERCIAL

## 2022-12-10 ENCOUNTER — PATIENT MESSAGE (OUTPATIENT)
Dept: ADMINISTRATIVE | Facility: OTHER | Age: 67
End: 2022-12-10
Payer: COMMERCIAL

## 2022-12-10 DIAGNOSIS — J18.9 COMMUNITY ACQUIRED PNEUMONIA, UNSPECIFIED LATERALITY: ICD-10-CM

## 2022-12-10 DIAGNOSIS — J18.9 COMMUNITY ACQUIRED PNEUMONIA OF LEFT LOWER LOBE OF LUNG: Primary | ICD-10-CM

## 2022-12-10 DIAGNOSIS — I21.4 NSTEMI (NON-ST ELEVATED MYOCARDIAL INFARCTION): ICD-10-CM

## 2022-12-10 DIAGNOSIS — I10 PRIMARY HYPERTENSION: ICD-10-CM

## 2022-12-10 DIAGNOSIS — R07.9 CHEST PAIN: ICD-10-CM

## 2022-12-10 DIAGNOSIS — Z98.890 H/O CARDIAC RADIOFREQUENCY ABLATION: ICD-10-CM

## 2022-12-10 DIAGNOSIS — R91.1 PULMONARY NODULE SEEN ON IMAGING STUDY: ICD-10-CM

## 2022-12-10 DIAGNOSIS — I48.19 PERSISTENT ATRIAL FIBRILLATION: ICD-10-CM

## 2022-12-10 LAB
ALBUMIN SERPL-MCNC: 3.8 GM/DL (ref 3.4–4.8)
ALBUMIN/GLOB SERPL: 1.4 RATIO (ref 1.1–2)
ALP SERPL-CCNC: 69 UNIT/L (ref 40–150)
ALT SERPL-CCNC: 35 UNIT/L (ref 0–55)
APPEARANCE UR: CLEAR
APTT PPP: 42.1 SECONDS (ref 23.2–33.7)
AST SERPL-CCNC: 39 UNIT/L (ref 5–34)
AV INDEX (PROSTH): 0.55
AV MEAN GRADIENT: 6 MMHG
AV PEAK GRADIENT: 10 MMHG
AV VALVE AREA: 1.73 CM2
AV VELOCITY RATIO: 0.56
BACTERIA #/AREA URNS AUTO: ABNORMAL /HPF
BASOPHILS # BLD AUTO: 0.05 X10(3)/MCL (ref 0–0.2)
BASOPHILS # BLD AUTO: NORMAL 10*3/UL
BASOPHILS NFR BLD AUTO: 0.5 %
BASOPHILS NFR BLD AUTO: NORMAL %
BILIRUB UR QL STRIP.AUTO: NEGATIVE MG/DL
BILIRUBIN DIRECT+TOT PNL SERPL-MCNC: 1.2 MG/DL
BNP BLD-MCNC: 91 PG/ML
BSA FOR ECHO PROCEDURE: 2.4 M2
BUN SERPL-MCNC: 12.8 MG/DL (ref 8.4–25.7)
CALCIUM SERPL-MCNC: 8.5 MG/DL (ref 8.8–10)
CHLORIDE SERPL-SCNC: 101 MMOL/L (ref 98–107)
CO2 SERPL-SCNC: 25 MMOL/L (ref 23–31)
COLOR UR AUTO: YELLOW
CREAT SERPL-MCNC: 0.85 MG/DL (ref 0.73–1.18)
CV ECHO LV RWT: 0.46 CM
D DIMER PPP IA.FEU-MCNC: 0.59 UG/ML FEU (ref 0–0.5)
DOP CALC AO PEAK VEL: 1.6 M/S
DOP CALC AO VTI: 30.8 CM
DOP CALC LVOT AREA: 3.1 CM2
DOP CALC LVOT DIAMETER: 2 CM
DOP CALC LVOT PEAK VEL: 0.9 M/S
DOP CALC LVOT STROKE VOLUME: 53.38 CM3
DOP CALC MV VTI: 29 CM
DOP CALCLVOT PEAK VEL VTI: 17 CM
E WAVE DECELERATION TIME: 201 MSEC
E/A RATIO: 1.18
E/E' RATIO: 8.55 M/S
ECHO LV POSTERIOR WALL: 1.12 CM (ref 0.6–1.1)
EJECTION FRACTION: 56 %
EOSINOPHIL # BLD AUTO: 0.09 X10(3)/MCL (ref 0–0.9)
EOSINOPHIL # BLD AUTO: NORMAL 10*3/UL
EOSINOPHIL NFR BLD AUTO: 0.9 %
EOSINOPHIL NFR BLD AUTO: NORMAL %
ERYTHROCYTE [DISTWIDTH] IN BLOOD BY AUTOMATED COUNT: 12.2 % (ref 11.5–17)
ERYTHROCYTE [DISTWIDTH] IN BLOOD BY AUTOMATED COUNT: NORMAL %
FLUAV AG UPPER RESP QL IA.RAPID: NOT DETECTED
FLUBV AG UPPER RESP QL IA.RAPID: NOT DETECTED
FRACTIONAL SHORTENING: 28 % (ref 28–44)
GFR SERPLBLD CREATININE-BSD FMLA CKD-EPI: >60 MLS/MIN/1.73/M2
GLOBULIN SER-MCNC: 2.8 GM/DL (ref 2.4–3.5)
GLUCOSE SERPL-MCNC: 105 MG/DL (ref 82–115)
GLUCOSE UR QL STRIP.AUTO: NEGATIVE MG/DL
HCT VFR BLD AUTO: 38.4 % (ref 42–52)
HCT VFR BLD AUTO: NORMAL %
HGB BLD-MCNC: 12.6 GM/DL (ref 14–18)
HGB BLD-MCNC: NORMAL G/DL
IMM GRANULOCYTES # BLD AUTO: 0.06 X10(3)/MCL (ref 0–0.04)
IMM GRANULOCYTES # BLD AUTO: NORMAL 10*3/UL
IMM GRANULOCYTES NFR BLD AUTO: 0.6 %
IMM GRANULOCYTES NFR BLD AUTO: NORMAL %
INR BLD: 1.88 (ref 0–1.3)
INTERVENTRICULAR SEPTUM: 1.11 CM (ref 0.6–1.1)
KETONES UR QL STRIP.AUTO: NEGATIVE MG/DL
LACTATE SERPL-SCNC: 1.4 MMOL/L (ref 0.5–2.2)
LEFT ATRIUM SIZE: 4.5 CM
LEFT ATRIUM VOLUME INDEX MOD: 40.2 ML/M2
LEFT ATRIUM VOLUME MOD: 92.8 CM3
LEFT INTERNAL DIMENSION IN SYSTOLE: 3.53 CM (ref 2.1–4)
LEFT VENTRICLE DIASTOLIC VOLUME INDEX: 48.05 ML/M2
LEFT VENTRICLE DIASTOLIC VOLUME: 111 ML
LEFT VENTRICLE MASS INDEX: 88 G/M2
LEFT VENTRICLE SYSTOLIC VOLUME INDEX: 22.5 ML/M2
LEFT VENTRICLE SYSTOLIC VOLUME: 51.9 ML
LEFT VENTRICULAR INTERNAL DIMENSION IN DIASTOLE: 4.87 CM (ref 3.5–6)
LEFT VENTRICULAR MASS: 202.28 G
LEUKOCYTE ESTERASE UR QL STRIP.AUTO: NEGATIVE UNIT/L
LV LATERAL E/E' RATIO: 8.55 M/S
LV SEPTAL E/E' RATIO: 8.55 M/S
LVOT MG: 2 MMHG
LVOT MV: 0.61 CM/S
LYMPHOCYTES # BLD AUTO: 1.48 X10(3)/MCL (ref 0.6–4.6)
LYMPHOCYTES # BLD AUTO: NORMAL 10*3/UL
LYMPHOCYTES NFR BLD AUTO: 14.7 %
LYMPHOCYTES NFR BLD AUTO: NORMAL %
MCH RBC QN AUTO: 33.8 PG (ref 27–31)
MCH RBC QN AUTO: NORMAL PG
MCHC RBC AUTO-ENTMCNC: 32.8 MG/DL (ref 33–36)
MCHC RBC AUTO-ENTMCNC: NORMAL G/DL
MCV RBC AUTO: 102.9 FL (ref 80–94)
MCV RBC AUTO: NORMAL FL
MONOCYTES # BLD AUTO: 0.96 X10(3)/MCL (ref 0.1–1.3)
MONOCYTES # BLD AUTO: NORMAL 10*3/UL
MONOCYTES NFR BLD AUTO: 9.6 %
MONOCYTES NFR BLD AUTO: NORMAL %
MV MEAN GRADIENT: 4 MMHG
MV PEAK A VEL: 0.8 M/S
MV PEAK E VEL: 0.94 M/S
MV PEAK GRADIENT: 7 MMHG
MV STENOSIS PRESSURE HALF TIME: 63 MS
MV VALVE AREA BY CONTINUITY EQUATION: 1.84 CM2
MV VALVE AREA P 1/2 METHOD: 3.49 CM2
NEUTROPHILS # BLD AUTO: 7.4 X10(3)/MCL (ref 2.1–9.2)
NEUTROPHILS # BLD AUTO: NORMAL 10*3/UL
NEUTROPHILS NFR BLD AUTO: 73.7 %
NEUTROPHILS NFR BLD AUTO: NORMAL %
NITRITE UR QL STRIP.AUTO: NEGATIVE
NRBC BLD AUTO-RTO: 0 %
NRBC BLD AUTO-RTO: NORMAL %
PH UR STRIP.AUTO: 8 [PH]
PISA TR MAX VEL: 2.94 M/S
PLATELET # BLD AUTO: 171 X10(3)/MCL (ref 130–400)
PLATELET # BLD AUTO: NORMAL 10*3/UL
PLATELETS.RETICULATED NFR BLD AUTO: NORMAL %
PMV BLD AUTO: 10.2 FL (ref 7.4–10.4)
PMV BLD AUTO: NORMAL FL
POTASSIUM SERPL-SCNC: 3.7 MMOL/L (ref 3.5–5.1)
PROT SERPL-MCNC: 6.6 GM/DL (ref 5.8–7.6)
PROT UR QL STRIP.AUTO: NEGATIVE MG/DL
PROTHROMBIN TIME: 21.3 SECONDS (ref 12.5–14.5)
PV PEAK VELOCITY: 0.96 CM/S
RA PRESSURE: 3 MMHG
RBC # BLD AUTO: 3.73 X10(6)/MCL (ref 4.7–6.1)
RBC # BLD AUTO: NORMAL 10*6/UL
RBC #/AREA URNS AUTO: 10 /HPF
RBC UR QL AUTO: ABNORMAL UNIT/L
SARS-COV-2 RNA RESP QL NAA+PROBE: NOT DETECTED
SODIUM SERPL-SCNC: 134 MMOL/L (ref 136–145)
SP GR UR STRIP.AUTO: 1.03 (ref 1–1.03)
SQUAMOUS #/AREA URNS AUTO: <5 /HPF
TDI LATERAL: 0.11 M/S
TDI SEPTAL: 0.11 M/S
TDI: 0.11 M/S
TR MAX PG: 35 MMHG
TRICUSPID ANNULAR PLANE SYSTOLIC EXCURSION: 2.53 CM
TROPONIN I SERPL-MCNC: 0.63 NG/ML (ref 0–0.04)
TROPONIN I SERPL-MCNC: 1.24 NG/ML (ref 0–0.04)
TROPONIN I SERPL-MCNC: 1.33 NG/ML (ref 0–0.04)
TV REST PULMONARY ARTERY PRESSURE: 38 MMHG
UROBILINOGEN UR STRIP-ACNC: 1 MG/DL
WBC # SPEC AUTO: 10 X10(3)/MCL (ref 4.5–11.5)
WBC # SPEC AUTO: NORMAL 10*3/UL
WBC #/AREA URNS AUTO: <5 /HPF

## 2022-12-10 PROCEDURE — 85025 COMPLETE CBC W/AUTO DIFF WBC: CPT | Performed by: STUDENT IN AN ORGANIZED HEALTH CARE EDUCATION/TRAINING PROGRAM

## 2022-12-10 PROCEDURE — 85610 PROTHROMBIN TIME: CPT | Performed by: STUDENT IN AN ORGANIZED HEALTH CARE EDUCATION/TRAINING PROGRAM

## 2022-12-10 PROCEDURE — 99285 EMERGENCY DEPT VISIT HI MDM: CPT | Mod: 25

## 2022-12-10 PROCEDURE — 93010 ELECTROCARDIOGRAM REPORT: CPT | Mod: ,,, | Performed by: INTERNAL MEDICINE

## 2022-12-10 PROCEDURE — 81001 URINALYSIS AUTO W/SCOPE: CPT | Performed by: STUDENT IN AN ORGANIZED HEALTH CARE EDUCATION/TRAINING PROGRAM

## 2022-12-10 PROCEDURE — 85730 THROMBOPLASTIN TIME PARTIAL: CPT | Performed by: STUDENT IN AN ORGANIZED HEALTH CARE EDUCATION/TRAINING PROGRAM

## 2022-12-10 PROCEDURE — 25500020 PHARM REV CODE 255: Performed by: STUDENT IN AN ORGANIZED HEALTH CARE EDUCATION/TRAINING PROGRAM

## 2022-12-10 PROCEDURE — 83880 ASSAY OF NATRIURETIC PEPTIDE: CPT | Performed by: STUDENT IN AN ORGANIZED HEALTH CARE EDUCATION/TRAINING PROGRAM

## 2022-12-10 PROCEDURE — 0240U COVID/FLU A&B PCR: CPT | Performed by: STUDENT IN AN ORGANIZED HEALTH CARE EDUCATION/TRAINING PROGRAM

## 2022-12-10 PROCEDURE — 85379 FIBRIN DEGRADATION QUANT: CPT | Performed by: STUDENT IN AN ORGANIZED HEALTH CARE EDUCATION/TRAINING PROGRAM

## 2022-12-10 PROCEDURE — 96360 HYDRATION IV INFUSION INIT: CPT

## 2022-12-10 PROCEDURE — 93005 ELECTROCARDIOGRAM TRACING: CPT

## 2022-12-10 PROCEDURE — 11000001 HC ACUTE MED/SURG PRIVATE ROOM

## 2022-12-10 PROCEDURE — 84484 ASSAY OF TROPONIN QUANT: CPT | Performed by: STUDENT IN AN ORGANIZED HEALTH CARE EDUCATION/TRAINING PROGRAM

## 2022-12-10 PROCEDURE — 25000003 PHARM REV CODE 250: Performed by: STUDENT IN AN ORGANIZED HEALTH CARE EDUCATION/TRAINING PROGRAM

## 2022-12-10 PROCEDURE — 84484 ASSAY OF TROPONIN QUANT: CPT | Performed by: PHYSICIAN ASSISTANT

## 2022-12-10 PROCEDURE — 87040 BLOOD CULTURE FOR BACTERIA: CPT | Performed by: STUDENT IN AN ORGANIZED HEALTH CARE EDUCATION/TRAINING PROGRAM

## 2022-12-10 PROCEDURE — 25000003 PHARM REV CODE 250: Performed by: NURSE PRACTITIONER

## 2022-12-10 PROCEDURE — 81003 URINALYSIS AUTO W/O SCOPE: CPT | Performed by: STUDENT IN AN ORGANIZED HEALTH CARE EDUCATION/TRAINING PROGRAM

## 2022-12-10 PROCEDURE — 83605 ASSAY OF LACTIC ACID: CPT | Performed by: STUDENT IN AN ORGANIZED HEALTH CARE EDUCATION/TRAINING PROGRAM

## 2022-12-10 PROCEDURE — 93010 EKG 12-LEAD: ICD-10-PCS | Mod: ,,, | Performed by: INTERNAL MEDICINE

## 2022-12-10 PROCEDURE — 63600175 PHARM REV CODE 636 W HCPCS: Performed by: STUDENT IN AN ORGANIZED HEALTH CARE EDUCATION/TRAINING PROGRAM

## 2022-12-10 PROCEDURE — 80053 COMPREHEN METABOLIC PANEL: CPT | Performed by: STUDENT IN AN ORGANIZED HEALTH CARE EDUCATION/TRAINING PROGRAM

## 2022-12-10 PROCEDURE — 63600175 PHARM REV CODE 636 W HCPCS: Performed by: NURSE PRACTITIONER

## 2022-12-10 RX ORDER — ENOXAPARIN SODIUM 150 MG/ML
1 INJECTION SUBCUTANEOUS
Status: DISCONTINUED | OUTPATIENT
Start: 2022-12-10 | End: 2022-12-11

## 2022-12-10 RX ORDER — PANTOPRAZOLE SODIUM 40 MG/1
40 TABLET, DELAYED RELEASE ORAL DAILY
Status: DISCONTINUED | OUTPATIENT
Start: 2022-12-10 | End: 2022-12-13 | Stop reason: HOSPADM

## 2022-12-10 RX ORDER — FLECAINIDE ACETATE 50 MG/1
50 TABLET ORAL 2 TIMES DAILY
Status: DISCONTINUED | OUTPATIENT
Start: 2022-12-10 | End: 2022-12-13 | Stop reason: HOSPADM

## 2022-12-10 RX ORDER — ASPIRIN 325 MG
325 TABLET, DELAYED RELEASE (ENTERIC COATED) ORAL
Status: COMPLETED | OUTPATIENT
Start: 2022-12-10 | End: 2022-12-10

## 2022-12-10 RX ORDER — ACETAMINOPHEN 325 MG/1
650 TABLET ORAL
Status: COMPLETED | OUTPATIENT
Start: 2022-12-10 | End: 2022-12-10

## 2022-12-10 RX ORDER — ENOXAPARIN SODIUM 100 MG/ML
40 INJECTION SUBCUTANEOUS EVERY 24 HOURS
Status: DISCONTINUED | OUTPATIENT
Start: 2022-12-10 | End: 2022-12-10

## 2022-12-10 RX ORDER — ONDANSETRON 2 MG/ML
4 INJECTION INTRAMUSCULAR; INTRAVENOUS EVERY 6 HOURS PRN
Status: DISCONTINUED | OUTPATIENT
Start: 2022-12-10 | End: 2022-12-13 | Stop reason: HOSPADM

## 2022-12-10 RX ORDER — ENOXAPARIN SODIUM 100 MG/ML
1 INJECTION SUBCUTANEOUS
Status: DISCONTINUED | OUTPATIENT
Start: 2022-12-10 | End: 2022-12-10

## 2022-12-10 RX ORDER — VALSARTAN 40 MG/1
40 TABLET ORAL DAILY
Status: DISCONTINUED | OUTPATIENT
Start: 2022-12-10 | End: 2022-12-11

## 2022-12-10 RX ORDER — METOPROLOL SUCCINATE 25 MG/1
25 TABLET, EXTENDED RELEASE ORAL 2 TIMES DAILY
Status: DISCONTINUED | OUTPATIENT
Start: 2022-12-10 | End: 2022-12-13 | Stop reason: HOSPADM

## 2022-12-10 RX ORDER — HYDRALAZINE HYDROCHLORIDE 20 MG/ML
10 INJECTION INTRAMUSCULAR; INTRAVENOUS
Status: DISCONTINUED | OUTPATIENT
Start: 2022-12-10 | End: 2022-12-13 | Stop reason: HOSPADM

## 2022-12-10 RX ORDER — AMLODIPINE BESYLATE 5 MG/1
5 TABLET ORAL DAILY
Status: DISCONTINUED | OUTPATIENT
Start: 2022-12-10 | End: 2022-12-13 | Stop reason: HOSPADM

## 2022-12-10 RX ORDER — ACETAMINOPHEN 325 MG/1
650 TABLET ORAL EVERY 4 HOURS PRN
Status: DISCONTINUED | OUTPATIENT
Start: 2022-12-10 | End: 2022-12-13 | Stop reason: HOSPADM

## 2022-12-10 RX ORDER — IBUPROFEN 200 MG
400 TABLET ORAL 3 TIMES DAILY
Status: COMPLETED | OUTPATIENT
Start: 2022-12-10 | End: 2022-12-11

## 2022-12-10 RX ADMIN — IBUPROFEN 400 MG: 200 TABLET, FILM COATED ORAL at 02:12

## 2022-12-10 RX ADMIN — AZITHROMYCIN DIHYDRATE 500 MG: 500 INJECTION, POWDER, LYOPHILIZED, FOR SOLUTION INTRAVENOUS at 11:12

## 2022-12-10 RX ADMIN — ENOXAPARIN SODIUM 120 MG: 150 INJECTION SUBCUTANEOUS at 11:12

## 2022-12-10 RX ADMIN — FLECAINIDE ACETATE 50 MG: 50 TABLET ORAL at 08:12

## 2022-12-10 RX ADMIN — IOPAMIDOL 100 ML: 755 INJECTION, SOLUTION INTRAVENOUS at 09:12

## 2022-12-10 RX ADMIN — METOPROLOL SUCCINATE 25 MG: 25 TABLET, EXTENDED RELEASE ORAL at 08:12

## 2022-12-10 RX ADMIN — CEFTRIAXONE SODIUM 2 G: 2 INJECTION, POWDER, FOR SOLUTION INTRAMUSCULAR; INTRAVENOUS at 10:12

## 2022-12-10 RX ADMIN — ACETAMINOPHEN 650 MG: 325 TABLET, FILM COATED ORAL at 10:12

## 2022-12-10 RX ADMIN — SODIUM CHLORIDE, POTASSIUM CHLORIDE, SODIUM LACTATE AND CALCIUM CHLORIDE 1000 ML: 600; 310; 30; 20 INJECTION, SOLUTION INTRAVENOUS at 11:12

## 2022-12-10 RX ADMIN — SODIUM CHLORIDE, POTASSIUM CHLORIDE, SODIUM LACTATE AND CALCIUM CHLORIDE 1000 ML: 600; 310; 30; 20 INJECTION, SOLUTION INTRAVENOUS at 10:12

## 2022-12-10 RX ADMIN — ASPIRIN 325 MG: 325 TABLET, COATED ORAL at 10:12

## 2022-12-10 RX ADMIN — IBUPROFEN 400 MG: 200 TABLET, FILM COATED ORAL at 08:12

## 2022-12-10 NOTE — H&P
[facility] Medicine History & Physical Examination       Patient Name: Serge Prado  MRN: [id number]  Patient Class: IP- Inpatient   Admission Date: 12/10/2022   Admitting Physician: Sergio Johnson MD  Length of Stay: 0  Attending Physician: Sergio Johnson MD  Primary Care Provider: Marcie Prado MD  Face-to-Face encounter date: 12/10/2022  Code Status: Full Code  Chief Complaint: Chest Pain (Patient reports chest pain and SOB since yesterday, ablation done 12/8 Dr Cary)        Patient information was obtained from patient, patient's family, past medical records and ER records.     HISTORY OF PRESENT ILLNESS:   Serge Prado is a 67 y.o. White male with a past medical history of hypertension, hyperlipidemia, atrial fibrillation on Xarelto status post ablation on 12/08/2022 with Dr. Cary, GERD and back pain. The patient presented to [facility] on 12/10/2022 with a primary complaint of chest pain that began yesterday afternoon (12/09/2022) at 1:00 PM while sitting down.  Patient reports chest pain is located to the right upper chest underneath his collarbone, initially constant, described as dullness without radiation.  Reports associated shortness a breath with exertion and a nonproductive cough and the sweats. He currently rates chest pain 5/10 on exam.  He denies complaints of abdominal pain, nausea, vomiting and diarrhea.    Upon presentation to the ED, temperature a 100.4° F, heart rate 84, blood pressure 154/74 and SpO2 98% on room air.  Labs with H&H 12.6/38.4, , BNP 91, troponin 1.335, D-dimer 0.59.  CMP pending.  Influenza A and B and SARS-CoV-2 PCR negative.  UA negative for infection.  EKG sinus rhythm with first-degree AV block.  Chest x-ray without acute pulmonary process.  CTA of the chest without pulmonary embolism but patchy small areas of right lung consolidation with 9 mm solid left lower lobe nodule suggestive of pneumonia.  While in ED patient received  Tylenol, full-dose aspirin, azithromycin and Rocephin.  Cardiology consulted.  Patient is admitted to hospital medicine services for further medical management.    PAST MEDICAL HISTORY:   Hyperlipidemia  Atrial fibrillation on Xarelto  GERD   Back pain    PAST SURGICAL HISTORY:     Past Surgical History:   Procedure Laterality Date    ABLATION N/A 12/8/2022    Procedure: ABLATION;  Surgeon: Scout Cary MD;  Location: [facility] CATH LAB;  Service: Cardiology;  Laterality: N/A;  EPS + AF CATH ABLATION W/ KETTY & ANEST.    BACK SURGERY      right knee repair Right     SHOULDER ARTHROSCOPY Right     KETTY/DCCV  07/2022       ALLERGIES:   Morphine    FAMILY HISTORY:   Mother: Cardiovascular disease   Father: Bladder cancer    SOCIAL HISTORY:   Tobacco - Denies  Alcohol - 2 times a week  Illicit Drugs - Denies    HOME MEDICATIONS:     Prior to Admission medications    Medication Sig Start Date End Date Taking? Authorizing Provider   amlodipine-olmesartan (ANGI) 10-40 mg per tablet Take 1 tablet by mouth once daily  Patient taking differently: Take 0.5 tablets by mouth once daily. 5/16/22   Marcie Prado MD   cholecalciferol, vitamin D3, (VITAMIN D3) 25 mcg (1,000 unit) capsule Take 2,000 Units by mouth once daily.    Historical Provider   flecainide (TAMBOCOR) 50 MG Tab Take 50 mg by mouth 2 (two) times a day.    Historical Provider   folic acid/multivit-min/lutein (CENTRUM SILVER ORAL) Take 1 tablet by mouth once daily.    Historical Provider   hydroCHLOROthiazide (HYDRODIURIL) 25 MG tablet Take 1 tablet by mouth once daily  Patient taking differently: Take 12.5 mg by mouth once daily. 5/16/22   Marcie Prado MD   metoprolol succinate (TOPROL-XL) 25 MG 24 hr tablet Take 25 mg by mouth 2 (two) times daily. 6/2/22   Historical Provider   pantoprazole (PROTONIX) 40 MG tablet Take 1 tablet (40 mg total) by mouth once daily. 12/8/22 1/7/23  Scout Cary MD   XARELTO 20 mg Tab Take 1 tablet by mouth once daily. 7/22/22    Historical Provider       REVIEW OF SYSTEMS:   Except as documented, all other systems reviewed and negative     PHYSICAL EXAM:     VITAL SIGNS: 24 HRS MIN & MAX LAST   Temp  Min: 100.4 °F (38 °C)  Max: 100.4 °F (38 °C) (!) 100.4 °F (38 °C)     BP  Min: 149/74  Max: 154/74 (!) 152/81     Pulse  Min: 82  Max: 84  82   Resp  Min: 18  Max: 21 (!) 21     SpO2  Min: 95 %  Max: 98 % 95 %       General appearance: Well-developed, well-nourished male in no apparent distress.   at bedside.  HEENT: Atraumatic head. Moist mucous membranes of oral cavity.  Lungs: Clear to auscultation bilaterally.  Rhonchi to left lower lung.  Heart: Regular rate and rhythm.  No edema.  Abdomen: Obese, soft, non-distended,.  Extremities: No cyanosis, clubbing. No deformities.  Skin: No Rash. Warm and dry.  Neuro: Awake, alert and oriented. Motor and sensory exams grossly intact.  Psych/mental status: Appropriate mood and affect. Cooperative. Responds appropriately to questions.       LABS AND IMAGING:     Recent Labs   Lab 12/07/22  0640 12/10/22  0905   WBC 7.0 10.0   RBC 4.23* 3.73*   HGB 14.2 12.6*   HCT 42.0 38.4*   MCV 99.3* 102.9*   MCH 33.6* 33.8*   MCHC 33.8 32.8*   RDW 11.8 12.2    171   MPV 9.9 10.2       Recent Labs   Lab 12/07/22  0640      K 4.5   CO2 21*   BUN 15.6   CREATININE 0.93   CALCIUM 9.6       Microbiology Results (last 7 days)       Procedure Component Value Units Date/Time    Blood culture x two cultures. Draw prior to antibiotics. [[id number]] Collected: 12/10/22 1036    Order Status: Sent Specimen: Blood Updated: 12/10/22 1114    Blood culture x two cultures. Draw prior to antibiotics. [[id number]] Collected: 12/10/22 1036    Order Status: Sent Specimen: Blood Updated: 12/10/22 1113             CTA Chest Non-Coronary (PE Studies)  Narrative: EXAMINATION:  CTA CHEST NON CORONARY (PE STUDIES)    CLINICAL HISTORY:  Pulmonary embolism (PE) suspected, positive D-dimer;    TECHNIQUE:  CTA imaging of the  chest after the administration of 100 mL Isovue-370 IV contrast. Axial, coronal and sagittal reconstructions, including MIP images, are reviewed. Dose length product 442 mGycm. Automatic exposure control, adjustment of mA/kV or iterative reconstruction technique used to limit radiation dose.    COMPARISON:  No relevant comparison studies available at the time of dictation.    FINDINGS:  Diagnostic quality: Adequate    Pulmonary embolism: None identified.    Lung parenchyma: Patchy areas of consolidation in the posterior portion of the right upper lobe and superior portion of the right lower lobe.  Largest area of consolidation measures about 3.5 cm in transverse diameter.  9 mm solid nodule in the superior portion of the left lower lobe (series 7, image 39).  Additional dependent consolidation in the lung bases.    Pleural effusion: Trace bilateral pleural effusions.    Mediastinum/lalo: Heart size upper normal with mild coronary artery and aortic calcification.  Single nonspecific borderline enlarged right paratracheal lymph node measuring 10 mm in short axis.    Chest wall/axilla: No significant findings.    Upper abdomen: No significant findings.    Bones: No acute osseous process.  Impression: 1. No pulmonary embolism identified.  2. Patchy small areas of right lung consolidation with 9 mm solid left lower lobe nodule.  Appearance is suggestive of pneumonia.  2-3 month follow-up chest CT can be considered to confirm resolution.    Electronically signed by: Alec Car  Date:    12/10/2022  Time:    09:41  X-Ray Chest 1 View  Narrative: EXAMINATION:  XR CHEST 1 VIEW    CLINICAL HISTORY:  Chest pain, unspecified    TECHNIQUE:  Frontal view(s) of the chest.    COMPARISON:  No relevant comparison studies available at the time of dictation.    FINDINGS:  Normal cardiac silhouette.  The lungs are well-inflated.  No consolidation identified.  No significant pleural effusion or discernible pneumothorax.  Impression:  No acute pulmonary process identified.    Electronically signed by: Alec Car  Date:    12/10/2022  Time:    09:25        ASSESSMENT & PLAN:   Assessment:  Acute chest pain   NSTEMI   Community-acquired pneumonia  Macrocytic anemia, stable   Elevated D-dimer   Essential hypertension  Hyperlipidemia  Atrial fibrillation on Xarelto status post ablation 12/08/2022    Plan:  - Cardiology consulted.  Appreciate recommendations   - Trend troponin  - Telemetry monitoring  - Echo ordered   - Tylenol as needed for fever   - Continue with azithromycin and Rocephin  - Resume appropriate home medications   - Labs in AM      VTE Prophylaxis: will be placed on Lovenox for DVT prophylaxis and will be advised to be as mobile as possible and sit in a chair as tolerated      __________________________________________________________________________  INPATIENT LIST OF MEDICATIONS     Current Facility-Administered Medications:     acetaminophen tablet 650 mg, 650 mg, Oral, Q4H PRN, Viviane Conklin PA-C    amLODIPine tablet 5 mg, 5 mg, Oral, Daily, LATISHA William    azithromycin 500 mg in dextrose 5 % 250 mL IVPB (ready to mix system), 500 mg, Intravenous, Once, Ean Balderas MD    [START ON 12/11/2022] azithromycin 500 mg in dextrose 5 % 250 mL IVPB (ready to mix system), 500 mg, Intravenous, Q24H, Viviane Conklin PA-C    [START ON 12/11/2022] cefTRIAXone (ROCEPHIN) 1 g in dextrose 5 % in water (D5W) 5 % 50 mL IVPB (MB+), 1 g, Intravenous, Q24H, Viviane Conklin PA-C    cefTRIAXone (ROCEPHIN) 2 g in dextrose 5 % in water (D5W) 5 % 50 mL IVPB (MB+), 2 g, Intravenous, Once, Ean Balderas MD, Last Rate: 100 mL/hr at 12/10/22 1058, 2 g at 12/10/22 1058    enoxaparin injection 120 mg, 1 mg/kg, Subcutaneous, Q12H, LATISHA William    flecainide tablet 50 mg, 50 mg, Oral, BID, LATISHA William    hydrALAZINE injection 10 mg, 10 mg, Intravenous, Q2H PRN, Viviane Conklin PA-C    lactated ringers  bolus 1,000 mL, 1,000 mL, Intravenous, Once, Ean Balderas MD, Last Rate: 999 mL/hr at 12/10/22 1032, 1,000 mL at 12/10/22 1032    lactated ringers bolus 3,537 mL, 30 mL/kg, Intravenous, ED 1 Time, Ean Balderas MD    metoprolol succinate (TOPROL-XL) 24 hr tablet 25 mg, 25 mg, Oral, BID, LATISHA William    ondansetron injection 4 mg, 4 mg, Intravenous, Q6H PRN, Viviane Conklin PA-C    pantoprazole EC tablet 40 mg, 40 mg, Oral, Daily, LATISHA William    valsartan tablet 40 mg, 40 mg, Oral, Daily, LATISHA William    Current Outpatient Medications:     amlodipine-olmesartan (ANGI) 10-40 mg per tablet, Take 1 tablet by mouth once daily (Patient taking differently: Take 0.5 tablets by mouth once daily.), Disp: 90 tablet, Rfl: 2    cholecalciferol, vitamin D3, (VITAMIN D3) 25 mcg (1,000 unit) capsule, Take 2,000 Units by mouth once daily., Disp: , Rfl:     flecainide (TAMBOCOR) 50 MG Tab, Take 50 mg by mouth 2 (two) times a day., Disp: , Rfl:     folic acid/multivit-min/lutein (CENTRUM SILVER ORAL), Take 1 tablet by mouth once daily., Disp: , Rfl:     hydroCHLOROthiazide (HYDRODIURIL) 25 MG tablet, Take 1 tablet by mouth once daily (Patient taking differently: Take 12.5 mg by mouth once daily.), Disp: 90 tablet, Rfl: 2    metoprolol succinate (TOPROL-XL) 25 MG 24 hr tablet, Take 25 mg by mouth 2 (two) times daily., Disp: , Rfl:     pantoprazole (PROTONIX) 40 MG tablet, Take 1 tablet (40 mg total) by mouth once daily., Disp: 30 tablet, Rfl: 0    XARELTO 20 mg Tab, Take 1 tablet by mouth once daily., Disp: , Rfl:       Scheduled Meds:   amLODIPine  5 mg Oral Daily    azithromycin  500 mg Intravenous Once    [START ON 12/11/2022] azithromycin  500 mg Intravenous Q24H    [START ON 12/11/2022] cefTRIAXone (ROCEPHIN) IVPB  1 g Intravenous Q24H    cefTRIAXone (ROCEPHIN) IVPB  2 g Intravenous Once    enoxaparin  1 mg/kg Subcutaneous Q12H    flecainide  50 mg Oral BID    lactated ringers   1,000 mL Intravenous Once    lactated ringers  30 mL/kg Intravenous ED 1 Time    metoprolol succinate  25 mg Oral BID    pantoprazole  40 mg Oral Daily    valsartan  40 mg Oral Daily     Continuous Infusions:  PRN Meds:.acetaminophen, hydrALAZINE, ondansetron      Discharge Planning and Disposition: Anticipated discharge to be determined.    I, GONZALO Ramirez, have reviewed and discussed the case with Dr. Sergio Johnson.    Please see the following addendum for further assessment and plan from there attending MD.    Viviane Conklin PA-C  12/10/2022    Dr johnson - chart reviewed and pt examined. Hx of chronic afib post ablation , remains nsr. Also hx of htn/hld. Pt comes in complaining of sob/ fever/right chest pain w rad to right neck . Cardiac enzymes slightly elevated, could be secondary to recent ablation but cis consulted. Chest x rays was followed by cta chest secondary to increased-dimers with findings of pachy areas of right lung consolidation. Started on rocephin and azithromycin. Hold xarelto and start full dose lovenox. Echocardiogram will follow. Trend cardiac enzymes.    Agree with assessment and plans done by GONZALO conklin. Some corrections were done to hpi/physical exam/assessment and plans at the time of my eval after reviewing  cis notes and plans. Appreciate assistance by cis.  Am labs      All diagnosis and differential diagnosis have been reviewed; assessment and plan has been documented; I have personally reviewed the labs and test results that are presently available; I have reviewed the patients medication list; I have reviewed the consulting providers response and recommendations. I have reviewed or attempted to review medical records based upon their availability.    All of the patient and family questions have been addressed and answered. Patient's is agreeable to the above stated plan. I will continue to monitor closely and make adjustments to medical management as needed.      Sergio  MD Alex  12/10/2022

## 2022-12-10 NOTE — ED PROVIDER NOTES
Encounter Date: 12/10/2022    SCRIBE #1 NOTE: I, Liza Hidalgo, am scribing for, and in the presence of,  Ean Balderas MD. I have scribed the following portions of the note - the EKG reading. Other sections scribed: HPI, ROS, PE.     History     Chief Complaint   Patient presents with    Chest Pain     Patient reports chest pain and SOB since yesterday, ablation done 12/8 Dr Cary     67 year old male with a history of A fib, and HTN presents to ED complaining of chest pain.  Pt had a cardiac ablation with Dr. Cary two days ago.  Pt reports chest pain that radiates to his right jaw with SOB that began yesterday.  He says that this is new chest pain.  He denies any nausea, numbness, diaphoresis, weakness or abdominal pain.    The history is provided by the patient. No  was used.   Chest Pain  The current episode started yesterday. The pain radiates to the right jaw and right shoulder. Primary symptoms include shortness of breath. Pertinent negatives for primary symptoms include no fever, no wheezing, no palpitations, no abdominal pain, no nausea, no vomiting and no dizziness.   The shortness of breath began yesterday.   Pertinent negatives for associated symptoms include no numbness and no weakness.   Review of patient's allergies indicates:   Allergen Reactions    Morphine Rash     Past Medical History:   Diagnosis Date    Atrial fibrillation     Atrial fibrillation with RVR     Back pain     GERD (gastroesophageal reflux disease)     Hemorrhoids     Hyperlipidemia     Hypertension      Past Surgical History:   Procedure Laterality Date    ABLATION N/A 12/8/2022    Procedure: ABLATION;  Surgeon: Scout Cary MD;  Location: [facility] CATH LAB;  Service: Cardiology;  Laterality: N/A;  EPS + AF CATH ABLATION W/ KETTY & ANEST.    BACK SURGERY      right knee repair Right     SHOULDER ARTHROSCOPY Right     KETTY/DCCV  07/2022     No family history on file.  Social History     Tobacco Use    Smoking  status: Never    Smokeless tobacco: Never   Substance Use Topics    Alcohol use: Yes     Alcohol/week: 2.0 standard drinks     Types: 2 Cans of beer per week     Comment: on weekends    Drug use: Never     Review of Systems   Constitutional:  Negative for chills and fever.   HENT:  Negative for drooling and sore throat.    Eyes:  Negative for pain and redness.   Respiratory:  Positive for shortness of breath. Negative for wheezing and stridor.    Cardiovascular:  Positive for chest pain. Negative for palpitations and leg swelling.   Gastrointestinal:  Negative for abdominal pain, nausea and vomiting.   Genitourinary:  Negative for dysuria and hematuria.   Musculoskeletal:  Negative for back pain, neck pain and neck stiffness.   Skin:  Negative for rash and wound.   Neurological:  Negative for dizziness, weakness and numbness.   Hematological:  Does not bruise/bleed easily.     Physical Exam     Initial Vitals [12/10/22 0723]   BP Pulse Resp Temp SpO2   (!) 154/74 84 18 (!) 100.4 °F (38 °C) 98 %      MAP       --         Physical Exam    Nursing note and vitals reviewed.  Constitutional: He appears well-developed. He is not diaphoretic. No distress.   HENT:   Head: Normocephalic and atraumatic.   Nose: Nose normal.   Mouth/Throat: Oropharynx is clear and moist.   Eyes: Conjunctivae and EOM are normal. Pupils are equal, round, and reactive to light.   Neck: Neck supple. Carotid bruit is not present.   No carotid thrill or bruit   Normal range of motion.  Cardiovascular:  Normal rate and regular rhythm.           No murmur heard.  Pulmonary/Chest: Breath sounds normal. No respiratory distress. He has no wheezes. He has no rales.   Abdominal: Abdomen is soft. He exhibits no distension. There is no abdominal tenderness.   Musculoskeletal:         General: No edema. Normal range of motion.      Cervical back: Normal range of motion and neck supple.     Neurological: He is alert and oriented to person, place, and time. He  has normal strength. No cranial nerve deficit.   Skin: Skin is warm. Capillary refill takes less than 2 seconds. No rash noted.   Bilateral cardiac cath sites are healing well   Psychiatric: He has a normal mood and affect. His behavior is normal.       ED Course   Critical Care    Date/Time: 12/13/2022 11:31 AM  Performed by: Ean Balderas MD  Authorized by: Sergio Johnson MD   Direct patient critical care time: 35 minutes  Total critical care time (exclusive of procedural time) : 35 minutes  Critical care time was exclusive of separately billable procedures and treating other patients.  Critical care was necessary to treat or prevent imminent or life-threatening deterioration of the following conditions: cardiac failure.  Critical care was time spent personally by me on the following activities: blood draw for specimens, development of treatment plan with patient or surrogate, discussions with consultants, evaluation of patient's response to treatment, examination of patient, obtaining history from patient or surrogate, ordering and performing treatments and interventions, ordering and review of laboratory studies, ordering and review of radiographic studies, re-evaluation of patient's condition and pulse oximetry.      Labs Reviewed   COMPREHENSIVE METABOLIC PANEL - Abnormal; Notable for the following components:       Result Value    Sodium Level 134 (*)     Calcium Level Total 8.5 (*)     Aspartate Aminotransferase 39 (*)     All other components within normal limits   TROPONIN I - Abnormal; Notable for the following components:    Troponin-I 1.335 (*)     All other components within normal limits   PROTIME-INR - Abnormal; Notable for the following components:    PT 21.3 (*)     INR 1.88 (*)     All other components within normal limits   APTT - Abnormal; Notable for the following components:    PTT 42.1 (*)     All other components within normal limits   D DIMER, QUANTITATIVE - Abnormal; Notable for  the following components:    D-Dimer 0.59 (*)     All other components within normal limits   CBC WITH DIFFERENTIAL - Abnormal; Notable for the following components:    RBC 3.73 (*)     Hgb 12.6 (*)     Hct 38.4 (*)     .9 (*)     MCH 33.8 (*)     MCHC 32.8 (*)     IG# 0.06 (*)     All other components within normal limits   URINALYSIS, REFLEX TO URINE CULTURE - Abnormal; Notable for the following components:    Specific Gravity, UA 1.034 (*)     Blood, UA Trace (*)     All other components within normal limits   URINALYSIS, MICROSCOPIC - Abnormal; Notable for the following components:    RBC, UA 10 (*)     All other components within normal limits   TROPONIN I - Abnormal; Notable for the following components:    Troponin-I 1.238 (*)     All other components within normal limits   TROPONIN I - Abnormal; Notable for the following components:    Troponin-I 0.633 (*)     All other components within normal limits   COMPREHENSIVE METABOLIC PANEL - Abnormal; Notable for the following components:    Sodium Level 133 (*)     Carbon Dioxide 22 (*)     Calcium Level Total 8.4 (*)     All other components within normal limits   CBC WITH DIFFERENTIAL - Abnormal; Notable for the following components:    RBC 3.50 (*)     Hgb 11.9 (*)     Hct 34.9 (*)     MCV 99.7 (*)     MCH 34.0 (*)     IG# 0.06 (*)     All other components within normal limits   B-TYPE NATRIURETIC PEPTIDE - Normal   COVID/FLU A&B PCR - Normal    Narrative:     The Xpert Xpress SARS-CoV-2/FLU/RSV plus is a rapid, multiplexed real-time PCR test intended for the simultaneous qualitative detection and differentiation of SARS-CoV-2, Influenza A, Influenza B, and respiratory syncytial virus (RSV) viral RNA in either nasopharyngeal swab or nasal swab specimens.         LACTIC ACID, PLASMA - Normal   CBC W/ AUTO DIFFERENTIAL    Narrative:     The following orders were created for panel order CBC auto differential.  Procedure                                Abnormality         Status                     ---------                               -----------         ------                     CBC with Differential[[id number]]                            Edited Result - FINAL        Please view results for these tests on the individual orders.   CBC WITH DIFFERENTIAL    Narrative:     Platelet clumping noted. Redraw specimen. Test reordered. Notified Shruthi Corky   CBC W/ AUTO DIFFERENTIAL    Narrative:     The following orders were created for panel order CBC Auto Differential.  Procedure                               Abnormality         Status                     ---------                               -----------         ------                     CBC with Differential[[id number]]        Abnormal            Final result                 Please view results for these tests on the individual orders.   CBC W/ AUTO DIFFERENTIAL    Narrative:     The following orders were created for panel order CBC Auto Differential.  Procedure                               Abnormality         Status                     ---------                               -----------         ------                     CBC with Differential[[id number]]        Abnormal            Final result                 Please view results for these tests on the individual orders.        ECG Results              EKG 12-lead (Final result)  Result time 12/10/22 19:51:13      Final result by Interface, Lab In [facility] (12/10/22 19:51:13)                   Narrative:    Test Reason : R07.9,    Vent. Rate : 085 BPM     Atrial Rate : 085 BPM     P-R Int : 240 ms          QRS Dur : 104 ms      QT Int : 376 ms       P-R-T Axes : 029 -10 047 degrees     QTc Int : 447 ms    Sinus rhythm with 1st degree A-V block  Otherwise normal ECG  Confirmed by Dariel Cates MD ([id number]) on 12/10/2022 7:51:02 PM    Referred By:             Confirmed By:Dariel Cates MD                                  Imaging Results              CTA Chest  Non-Coronary (PE Studies) (Final result)  Result time 12/10/22 09:41:56      Final result by Alec Car MD (12/10/22 09:41:56)                   Impression:      1. No pulmonary embolism identified.  2. Patchy small areas of right lung consolidation with 9 mm solid left lower lobe nodule.  Appearance is suggestive of pneumonia.  2-3 month follow-up chest CT can be considered to confirm resolution.      Electronically signed by: Alec Car  Date:    12/10/2022  Time:    09:41               Narrative:    EXAMINATION:  CTA CHEST NON CORONARY (PE STUDIES)    CLINICAL HISTORY:  Pulmonary embolism (PE) suspected, positive D-dimer;    TECHNIQUE:  CTA imaging of the chest after the administration of 100 mL Isovue-370 IV contrast. Axial, coronal and sagittal reconstructions, including MIP images, are reviewed. Dose length product 442 mGycm. Automatic exposure control, adjustment of mA/kV or iterative reconstruction technique used to limit radiation dose.    COMPARISON:  No relevant comparison studies available at the time of dictation.    FINDINGS:  Diagnostic quality: Adequate    Pulmonary embolism: None identified.    Lung parenchyma: Patchy areas of consolidation in the posterior portion of the right upper lobe and superior portion of the right lower lobe.  Largest area of consolidation measures about 3.5 cm in transverse diameter.  9 mm solid nodule in the superior portion of the left lower lobe (series 7, image 39).  Additional dependent consolidation in the lung bases.    Pleural effusion: Trace bilateral pleural effusions.    Mediastinum/lalo: Heart size upper normal with mild coronary artery and aortic calcification.  Single nonspecific borderline enlarged right paratracheal lymph node measuring 10 mm in short axis.    Chest wall/axilla: No significant findings.    Upper abdomen: No significant findings.    Bones: No acute osseous process.                                       X-Ray Chest 1 View (Final  result)  Result time 12/10/22 09:25:46      Final result by Alec Car MD (12/10/22 09:25:46)                   Impression:      No acute pulmonary process identified.      Electronically signed by: Alec Car  Date:    12/10/2022  Time:    09:25               Narrative:    EXAMINATION:  XR CHEST 1 VIEW    CLINICAL HISTORY:  Chest pain, unspecified    TECHNIQUE:  Frontal view(s) of the chest.    COMPARISON:  No relevant comparison studies available at the time of dictation.    FINDINGS:  Normal cardiac silhouette.  The lungs are well-inflated.  No consolidation identified.  No significant pleural effusion or discernible pneumothorax.                                    X-Rays:   Independently Interpreted Readings:   Other Readings:  CXR: No ptx, no consolidation  Medications   cefTRIAXone (ROCEPHIN) 1 g in dextrose 5 % in water (D5W) 5 % 50 mL IVPB (MB+) (0 g Intravenous Stopped 12/12/22 1306)   azithromycin 500 mg in dextrose 5 % 250 mL IVPB (ready to mix system) (500 mg Intravenous New Bag 12/13/22 0541)   hydrALAZINE injection 10 mg (0 mg Intravenous Hold 12/11/22 0504)   ondansetron injection 4 mg (has no administration in time range)   acetaminophen tablet 650 mg (has no administration in time range)   flecainide tablet 50 mg (50 mg Oral Given 12/12/22 2023)   metoprolol succinate (TOPROL-XL) 24 hr tablet 25 mg (25 mg Oral Given 12/13/22 1131)   pantoprazole EC tablet 40 mg (40 mg Oral Given 12/13/22 1130)   amLODIPine tablet 5 mg (5 mg Oral Given 12/13/22 1131)   valsartan tablet 80 mg (80 mg Oral Given 12/13/22 1130)   ibuprofen tablet 400 mg (400 mg Oral Given 12/13/22 1131)   rivaroxaban tablet 20 mg (20 mg Oral Given 12/12/22 1749)   iopamidoL (ISOVUE-370) injection 100 mL (100 mLs Intravenous Given 12/10/22 0925)   lactated ringers bolus 3,537 mL (0 mLs Intravenous Stopped 12/10/22 1248)   lactated ringers bolus 1,000 mL (0 mLs Intravenous Stopped 12/10/22 1132)   cefTRIAXone (ROCEPHIN) 2 g in  dextrose 5 % in water (D5W) 5 % 50 mL IVPB (MB+) (0 g Intravenous Stopped 12/10/22 1128)   azithromycin 500 mg in dextrose 5 % 250 mL IVPB (ready to mix system) (0 mg Intravenous Stopped 12/10/22 1249)   acetaminophen tablet 650 mg (650 mg Oral Given 12/10/22 1057)   aspirin EC tablet 325 mg (325 mg Oral Given 12/10/22 1059)   ibuprofen tablet 400 mg (400 mg Oral Given 12/11/22 0923)     Medical Decision Making:   Differential Diagnosis:   ACS, arrhythmia, PE, pericardial effusion, postop pain, musculoskeletal chest wall pain, pneumothorax  Independently Interpreted Test(s):   I have ordered and independently interpreted X-rays - see prior notes.  I have ordered and independently interpreted EKG Reading(s) - see prior notes  Clinical Tests:   Lab Tests: Ordered and Reviewed  Radiological Study: Ordered and Reviewed  Medical Tests: Ordered and Reviewed  ED Management:  MDM: Serge Prado is a 67 y.o. male with above past medical history who presents to the ED for chest pain. Vital signs reviewed.  Afebrile hemodynamically stable.  EKG with no STEMI criteria.  Patient reports his pain started yesterday and continued today.  He reports no complications from his recent ablation 2 days ago and he was discharged home uneventfully where he continued to do well until his pain developed the day after discharge.  He is maintaining normal oxygen saturations on room air with no evidence of respiratory distress.  Chest x-ray is pending.  Labs including troponin are pending.  IV fluid resuscitation ordered.  Pain and nausea control as needed.  Cardiology will be consulted given the patient's recent procedure, appreciate recommendations.  D-dimer ordered to rule out PE.  Patient agrees with plan of care and all questions answered at bedside.    Update:  Age adjusted D-dimer is negative.  Chest x-ray reveals evidence of an opacity, given the patient's chest pain and recent operative stay, will proceed with treatment with  antibiotics to concern for pneumonia.  CT angio of the chest ordered to evaluate further and reveals patchy small areas of right lung consolidation which again is concerning for pneumonia, will continue antibiotics.  Troponin is elevated.  Cardiology has been consulted and recommends admission with troponin trending and they will evaluate the patient.  They recommend hospital Medicine admission.  Bedside ultrasound reveals no pericardial effusion with adequate squeeze and no evidence of right heart strain.  Hospital Medicine has been consulted and has accepted the patient for admission.    Dispo: Admit    Data Reviewed/Counseling: I have personally reviewed the patient's vital signs, nursing notes, and other relevant tests, information, and imaging. I had a detailed discussion regarding the historical points, exam findings, and any diagnostic results supporting the discharge diagnosis.     Portions of this note were dictated using voice recognition software. Although it was reviewed for accuracy, some inherent voice recognition errors may have occurred and be present in this document.    Other:   I have discussed this case with another health care provider.        Scribe Attestation:   Scribe #1: I performed the above scribed service and the documentation accurately describes the services I performed. I attest to the accuracy of the note.    Attending Attestation:           Physician Attestation for Scribe:  Physician Attestation Statement for Scribe #1: I, Ean Balderas MD, reviewed documentation, as scribed by Liza Hidalgo in my presence, and it is both accurate and complete.           ED Course as of 12/13/22 1134   Sat Dec 10, 2022   0801 EKG 12-lead  EKG independently interpreted by me.  EKG: NSR @ 85, 1st degree AV block (), no STEMI, QTc 447 []   0917 Troponin I #1(!)  Cardiology consulted. [MC]   1014 No large pericardial effusion on bedside POCUS, adequate EF. [MC]      ED Course User  Index  [MC] Ean Balderas MD                   Clinical Impression:   Final diagnoses:  [R07.9] Chest pain  [I21.4] NSTEMI (non-ST elevated myocardial infarction) (Primary)  [J18.9] Pneumonia of right lung due to infectious organism, unspecified part of lung  [J18.9] Pneumonia        ED Disposition Condition    Admit Stable                Ean Balderas MD  12/13/22 1134       Ean Balderas MD  12/13/22 1135

## 2022-12-10 NOTE — CONSULTS
Inpatient consult to Cardiology  Consult performed by: LATISHA William  Consult ordered by: Ean Balderas MD    [facility] - Emergency Dept  Cardiology  Consult Note    Patient Name: Serge Prado  MRN: [id number]  Admission Date: 12/10/2022  Hospital Length of Stay: 0 days  Code Status: No Order   Attending Provider: Sergio Johnson MD   Consulting Provider: LATISHA William  Primary Care Physician: Marcie Prado MD  Principal Problem:<principal problem not specified>    Patient information was obtained from patient, past medical records, and ER records.     Subjective:     Chief Complaint:  chest pain/SOB     HPI:   Mr. Prado is a 68y/o male, followed by Dr. Cary/Bri, who recently underwent EPS with RFA for persistent Afib on 12/8/22 presented to hospital with c/o 1 day hx of chest pain radiating to right jaw with associated SOB. Denies N/V/diaphoresis. Workup revealed D-dimer 0.59, Troponin 1.335. Negative COVID/Influenza A/B. EKG revealing SR with 1st degree AVB. CTA chest negative PE but is suggestive of pneumonia. He has been started on antibiotics and admitted to Hospitalist. [facility] has been consulted for chest pain and elevated troponin      PMH: Persistent AF/xarelto, HTN, HLD, GERD  PSH: EPS with RFA, shoulder arthroscopy, KETTY/DCCV  Family History: Mother- MI-reason for death age 82  Social History: Never smoker, occasional ETOH      Previous Cardiac Diagnostics:   Ablation 12/8/2022:  Successful Pulmonary vein RF ablation .Posterior wall isolation. Cardioversion. NSR without significant arrhythmia.     KETTY 12/08/2022:  LV normal in size with normal systolic function. A diastolic pattern with afib observed. Normal RV size with normal RV systolic function. Moderate LA enlargement. No interatrial septal defect present. Normal appearing DIAMOND. No thrombus present in appendage. Mild MRLucero    YENI 09/28/22:  Normal perfusion study. No perfusion defects noted. Stress LVEF  52% and LV global function is normal. Rest LV cavity is normal with rest EF 53% and normal LV global function.     Past Medical History:   Diagnosis Date    Atrial fibrillation     Atrial fibrillation with RVR     Back pain     GERD (gastroesophageal reflux disease)     Hemorrhoids     Hyperlipidemia     Hypertension        Past Surgical History:   Procedure Laterality Date    ABLATION N/A 12/8/2022    Procedure: ABLATION;  Surgeon: Scout Cary MD;  Location: [facility] CATH LAB;  Service: Cardiology;  Laterality: N/A;  EPS + AF CATH ABLATION W/ KETTY & ANEST.    BACK SURGERY      right knee repair Right     SHOULDER ARTHROSCOPY Right     KETTY/DCCV  07/2022       Review of patient's allergies indicates:   Allergen Reactions    Morphine Rash       No current facility-administered medications on file prior to encounter.     Current Outpatient Medications on File Prior to Encounter   Medication Sig    amlodipine-olmesartan (ANGI) 10-40 mg per tablet Take 1 tablet by mouth once daily (Patient taking differently: Take 0.5 tablets by mouth once daily.)    cholecalciferol, vitamin D3, (VITAMIN D3) 25 mcg (1,000 unit) capsule Take 2,000 Units by mouth once daily.    flecainide (TAMBOCOR) 50 MG Tab Take 50 mg by mouth 2 (two) times a day.    folic acid/multivit-min/lutein (CENTRUM SILVER ORAL) Take 1 tablet by mouth once daily.    hydroCHLOROthiazide (HYDRODIURIL) 25 MG tablet Take 1 tablet by mouth once daily (Patient taking differently: Take 12.5 mg by mouth once daily.)    metoprolol succinate (TOPROL-XL) 25 MG 24 hr tablet Take 25 mg by mouth 2 (two) times daily.    pantoprazole (PROTONIX) 40 MG tablet Take 1 tablet (40 mg total) by mouth once daily.    XARELTO 20 mg Tab Take 1 tablet by mouth once daily.     Family History    None       Tobacco Use    Smoking status: Never    Smokeless tobacco: Never   Substance and Sexual Activity    Alcohol use: Yes     Alcohol/week: 2.0 standard drinks     Types: 2 Cans of beer per week      Comment: on weekends    Drug use: Never    Sexual activity: Yes     Partners: Female       Review of Systems   Respiratory:  Positive for shortness of breath.    Cardiovascular:  Positive for chest pain.   Gastrointestinal: Negative.    Genitourinary: Negative.    Musculoskeletal: Negative.    Skin: Negative.    Neurological: Negative.    Psychiatric/Behavioral: Negative.       Objective:     Vital Signs (Most Recent):  Temp: (!) 100.4 °F (38 °C) (12/10/22 0723)  Pulse: 82 (12/10/22 0932)  Resp: (!) 21 (12/10/22 0833)  BP: (!) 152/81 (12/10/22 0932)  SpO2: 95 % (12/10/22 0932)   Vital Signs (24h Range):  Temp:  [100.4 °F (38 °C)] 100.4 °F (38 °C)  Pulse:  [82-84] 82  Resp:  [18-21] 21  SpO2:  [95 %-98 %] 95 %  BP: (149-154)/(74-81) 152/81     Weight: 117.9 kg (260 lb)  Body mass index is 38.4 kg/m².    SpO2: 95 %       No intake or output data in the 24 hours ending 12/10/22 1041    Lines/Drains/Airways       Peripheral Intravenous Line  Duration                  Peripheral IV - Single Lumen 12/10/22 1032 20 G Left Shoulder <1 day         Peripheral IV - Single Lumen 12/10/22 20 G Left Antecubital <1 day                    Significant Labs:  Recent Results (from the past 72 hour(s))   Transesophageal echo (KETTY)    Collection Time: 12/08/22  8:07 AM   Result Value Ref Range    BSA 2.4 m2   Troponin I #1    Collection Time: 12/10/22  8:21 AM   Result Value Ref Range    Troponin-I 1.335 (H) 0.000 - 0.045 ng/mL   B-Type natriuretic peptide (BNP)    Collection Time: 12/10/22  8:21 AM   Result Value Ref Range    Natriuretic Peptide 91.0 <=100.0 pg/mL   CBC with Differential    Collection Time: 12/10/22  8:21 AM   Result Value Ref Range    WBC      RBC      Hgb      Hct      MCV      MCH      MCHC      RDW      Platelet      MPV      Neut %      Lymph %      Mono %      Eos %      Basophil %      Lymph #      Neut #      Mono #      Eos #      Baso #      IG#      IG%      NRBC%      IPF     Protime-INR    Collection Time:  12/10/22  8:21 AM   Result Value Ref Range    PT 21.3 (H) 12.5 - 14.5 seconds    INR 1.88 (H) 0.00 - 1.30   APTT    Collection Time: 12/10/22  8:21 AM   Result Value Ref Range    PTT 42.1 (H) 23.2 - 33.7 seconds   D dimer, quantitative    Collection Time: 12/10/22  8:21 AM   Result Value Ref Range    D-Dimer 0.59 (H) 0.00 - 0.50 ug/mL FEU   Lactic acid, plasma    Collection Time: 12/10/22  8:21 AM   Result Value Ref Range    Lactic Acid Level 1.4 0.5 - 2.2 mmol/L   COVID/FLU A&B PCR    Collection Time: 12/10/22  8:25 AM   Result Value Ref Range    Influenza A PCR Not Detected Not Detected    Influenza B PCR Not Detected Not Detected    SARS-CoV-2 PCR Not Detected Not Detected   CBC with Differential    Collection Time: 12/10/22  9:05 AM   Result Value Ref Range    WBC 10.0 4.5 - 11.5 x10(3)/mcL    RBC 3.73 (L) 4.70 - 6.10 x10(6)/mcL    Hgb 12.6 (L) 14.0 - 18.0 gm/dL    Hct 38.4 (L) 42.0 - 52.0 %    .9 (H) 80.0 - 94.0 fL    MCH 33.8 (H) 27.0 - 31.0 pg    MCHC 32.8 (L) 33.0 - 36.0 mg/dL    RDW 12.2 11.5 - 17.0 %    Platelet 171 130 - 400 x10(3)/mcL    MPV 10.2 7.4 - 10.4 fL    Neut % 73.7 %    Lymph % 14.7 %    Mono % 9.6 %    Eos % 0.9 %    Basophil % 0.5 %    Lymph # 1.48 0.6 - 4.6 x10(3)/mcL    Neut # 7.4 2.1 - 9.2 x10(3)/mcL    Mono # 0.96 0.1 - 1.3 x10(3)/mcL    Eos # 0.09 0 - 0.9 x10(3)/mcL    Baso # 0.05 0 - 0.2 x10(3)/mcL    IG# 0.06 (H) 0 - 0.04 x10(3)/mcL    IG% 0.6 %    NRBC% 0.0 %       Significant Imaging:  Imaging Results              CTA Chest Non-Coronary (PE Studies) (Final result)  Result time 12/10/22 09:41:56      Final result by Alec Car MD (12/10/22 09:41:56)                   Impression:      1. No pulmonary embolism identified.  2. Patchy small areas of right lung consolidation with 9 mm solid left lower lobe nodule.  Appearance is suggestive of pneumonia.  2-3 month follow-up chest CT can be considered to confirm resolution.      Electronically signed by: Alec  Josep  Date:    12/10/2022  Time:    09:41               Narrative:    EXAMINATION:  CTA CHEST NON CORONARY (PE STUDIES)    CLINICAL HISTORY:  Pulmonary embolism (PE) suspected, positive D-dimer;    TECHNIQUE:  CTA imaging of the chest after the administration of 100 mL Isovue-370 IV contrast. Axial, coronal and sagittal reconstructions, including MIP images, are reviewed. Dose length product 442 mGycm. Automatic exposure control, adjustment of mA/kV or iterative reconstruction technique used to limit radiation dose.    COMPARISON:  No relevant comparison studies available at the time of dictation.    FINDINGS:  Diagnostic quality: Adequate    Pulmonary embolism: None identified.    Lung parenchyma: Patchy areas of consolidation in the posterior portion of the right upper lobe and superior portion of the right lower lobe.  Largest area of consolidation measures about 3.5 cm in transverse diameter.  9 mm solid nodule in the superior portion of the left lower lobe (series 7, image 39).  Additional dependent consolidation in the lung bases.    Pleural effusion: Trace bilateral pleural effusions.    Mediastinum/lalo: Heart size upper normal with mild coronary artery and aortic calcification.  Single nonspecific borderline enlarged right paratracheal lymph node measuring 10 mm in short axis.    Chest wall/axilla: No significant findings.    Upper abdomen: No significant findings.    Bones: No acute osseous process.                                       X-Ray Chest 1 View (Final result)  Result time 12/10/22 09:25:46      Final result by Alec Car MD (12/10/22 09:25:46)                   Impression:      No acute pulmonary process identified.      Electronically signed by: Alec Car  Date:    12/10/2022  Time:    09:25               Narrative:    EXAMINATION:  XR CHEST 1 VIEW    CLINICAL HISTORY:  Chest pain, unspecified    TECHNIQUE:  Frontal view(s) of the chest.    COMPARISON:  No relevant comparison  studies available at the time of dictation.    FINDINGS:  Normal cardiac silhouette.  The lungs are well-inflated.  No consolidation identified.  No significant pleural effusion or discernible pneumothorax.                                      EKG:          Physical Exam  HENT:      Mouth/Throat:      Mouth: Mucous membranes are moist.   Cardiovascular:      Rate and Rhythm: Normal rate and regular rhythm.      Pulses: Normal pulses.      Heart sounds: Normal heart sounds.   Pulmonary:      Effort: Pulmonary effort is normal.      Breath sounds: Normal breath sounds.   Abdominal:      General: Abdomen is flat.      Palpations: Abdomen is soft.   Musculoskeletal:         General: Normal range of motion.   Skin:     General: Skin is warm.      Capillary Refill: Capillary refill takes less than 2 seconds.   Neurological:      General: No focal deficit present.      Mental Status: He is alert.   Psychiatric:         Mood and Affect: Mood normal.       Home Medications:   No current facility-administered medications on file prior to encounter.     Current Outpatient Medications on File Prior to Encounter   Medication Sig Dispense Refill    amlodipine-olmesartan (ANGI) 10-40 mg per tablet Take 1 tablet by mouth once daily (Patient taking differently: Take 0.5 tablets by mouth once daily.) 90 tablet 2    cholecalciferol, vitamin D3, (VITAMIN D3) 25 mcg (1,000 unit) capsule Take 2,000 Units by mouth once daily.      flecainide (TAMBOCOR) 50 MG Tab Take 50 mg by mouth 2 (two) times a day.      folic acid/multivit-min/lutein (CENTRUM SILVER ORAL) Take 1 tablet by mouth once daily.      hydroCHLOROthiazide (HYDRODIURIL) 25 MG tablet Take 1 tablet by mouth once daily (Patient taking differently: Take 12.5 mg by mouth once daily.) 90 tablet 2    metoprolol succinate (TOPROL-XL) 25 MG 24 hr tablet Take 25 mg by mouth 2 (two) times daily.      pantoprazole (PROTONIX) 40 MG tablet Take 1 tablet (40 mg total) by mouth once daily. 30  tablet 0    XARELTO 20 mg Tab Take 1 tablet by mouth once daily.         Current Inpatient Medications:    Current Facility-Administered Medications:     acetaminophen tablet 650 mg, 650 mg, Oral, ED 1 Time, Ean Balderas MD    aspirin EC tablet 325 mg, 325 mg, Oral, ED 1 Time, Ean Balderas MD    azithromycin 500 mg in dextrose 5 % 250 mL IVPB (ready to mix system), 500 mg, Intravenous, Once, Ean Balderas MD    cefTRIAXone (ROCEPHIN) 2 g in dextrose 5 % in water (D5W) 5 % 50 mL IVPB (MB+), 2 g, Intravenous, Once, Ean Balderas MD    lactated ringers bolus 1,000 mL, 1,000 mL, Intravenous, Once, Ean Balderas MD, Last Rate: 999 mL/hr at 12/10/22 1032, 1,000 mL at 12/10/22 1032    lactated ringers bolus 3,537 mL, 30 mL/kg, Intravenous, ED 1 Time, Ean Balderas MD    Current Outpatient Medications:     amlodipine-olmesartan (ANGI) 10-40 mg per tablet, Take 1 tablet by mouth once daily (Patient taking differently: Take 0.5 tablets by mouth once daily.), Disp: 90 tablet, Rfl: 2    cholecalciferol, vitamin D3, (VITAMIN D3) 25 mcg (1,000 unit) capsule, Take 2,000 Units by mouth once daily., Disp: , Rfl:     flecainide (TAMBOCOR) 50 MG Tab, Take 50 mg by mouth 2 (two) times a day., Disp: , Rfl:     folic acid/multivit-min/lutein (CENTRUM SILVER ORAL), Take 1 tablet by mouth once daily., Disp: , Rfl:     hydroCHLOROthiazide (HYDRODIURIL) 25 MG tablet, Take 1 tablet by mouth once daily (Patient taking differently: Take 12.5 mg by mouth once daily.), Disp: 90 tablet, Rfl: 2    metoprolol succinate (TOPROL-XL) 25 MG 24 hr tablet, Take 25 mg by mouth 2 (two) times daily., Disp: , Rfl:     pantoprazole (PROTONIX) 40 MG tablet, Take 1 tablet (40 mg total) by mouth once daily., Disp: 30 tablet, Rfl: 0    XARELTO 20 mg Tab, Take 1 tablet by mouth once daily., Disp: , Rfl:          VTE Risk Mitigation (From admission, onward)      None            Assessment:   Chest pain  Elevated  troponin  --likely due to recent ablation  --Normal PET 9/22  Pneumonia (confirmed by CT - no PE)  --on ABX  HTN  HLD  GERD  Hx pAF  --s/p EPS with RFA on 12/8/22; maintaining NSR  --on xarelto       Plan:   Elevated troponin likely due to recent ablation. Trend to peak  Patient had a normal PET in 9/22  Will treat for possible pericarditis following ablation: start Ibuprofen 400mg TID  Start protonix 40 mg po daily  Obtain Echo to assess EF and for pericardial effusion  Primary to treat pneumonia  BP above goal. Resume amlodipine 5mg daily, valsartan 40 mg daily,  and metoprolol succinate 25 mg bid.  Increase Lovenox to 1mg/kg bid (therapeutic dosage)  Hold xarelto for now (will resume upon discharge)        Thank you for your consult.     Aileen Lozoya, LATISHA  Cardiology  [facility] - Emergency Dept  12/10/2022 10:41 AM       Patient seen by myself independently of the NP.  Plan formulated by me and patient understands and agrees    Elias Prado MD  12/10/22

## 2022-12-11 LAB
ALBUMIN SERPL-MCNC: 3.4 GM/DL (ref 3.4–4.8)
ALBUMIN/GLOB SERPL: 1.2 RATIO (ref 1.1–2)
ALP SERPL-CCNC: 68 UNIT/L (ref 40–150)
ALT SERPL-CCNC: 25 UNIT/L (ref 0–55)
AST SERPL-CCNC: 29 UNIT/L (ref 5–34)
BASOPHILS # BLD AUTO: 0.04 X10(3)/MCL (ref 0–0.2)
BASOPHILS NFR BLD AUTO: 0.4 %
BILIRUBIN DIRECT+TOT PNL SERPL-MCNC: 1.2 MG/DL
BUN SERPL-MCNC: 10.1 MG/DL (ref 8.4–25.7)
CALCIUM SERPL-MCNC: 8.4 MG/DL (ref 8.8–10)
CHLORIDE SERPL-SCNC: 101 MMOL/L (ref 98–107)
CO2 SERPL-SCNC: 22 MMOL/L (ref 23–31)
CREAT SERPL-MCNC: 0.79 MG/DL (ref 0.73–1.18)
EOSINOPHIL # BLD AUTO: 0.19 X10(3)/MCL (ref 0–0.9)
EOSINOPHIL NFR BLD AUTO: 2.1 %
ERYTHROCYTE [DISTWIDTH] IN BLOOD BY AUTOMATED COUNT: 12 % (ref 11.5–17)
GFR SERPLBLD CREATININE-BSD FMLA CKD-EPI: >60 MLS/MIN/1.73/M2
GLOBULIN SER-MCNC: 2.9 GM/DL (ref 2.4–3.5)
GLUCOSE SERPL-MCNC: 103 MG/DL (ref 82–115)
HCT VFR BLD AUTO: 34.9 % (ref 42–52)
HGB BLD-MCNC: 11.9 GM/DL (ref 14–18)
IMM GRANULOCYTES # BLD AUTO: 0.06 X10(3)/MCL (ref 0–0.04)
IMM GRANULOCYTES NFR BLD AUTO: 0.7 %
LYMPHOCYTES # BLD AUTO: 0.97 X10(3)/MCL (ref 0.6–4.6)
LYMPHOCYTES NFR BLD AUTO: 10.7 %
MCH RBC QN AUTO: 34 PG (ref 27–31)
MCHC RBC AUTO-ENTMCNC: 34.1 MG/DL (ref 33–36)
MCV RBC AUTO: 99.7 FL (ref 80–94)
MONOCYTES # BLD AUTO: 0.64 X10(3)/MCL (ref 0.1–1.3)
MONOCYTES NFR BLD AUTO: 7.1 %
NEUTROPHILS # BLD AUTO: 7.2 X10(3)/MCL (ref 2.1–9.2)
NEUTROPHILS NFR BLD AUTO: 79 %
NRBC BLD AUTO-RTO: 0 %
PLATELET # BLD AUTO: 159 X10(3)/MCL (ref 130–400)
PMV BLD AUTO: 10.1 FL (ref 7.4–10.4)
POTASSIUM SERPL-SCNC: 3.6 MMOL/L (ref 3.5–5.1)
PROT SERPL-MCNC: 6.3 GM/DL (ref 5.8–7.6)
RBC # BLD AUTO: 3.5 X10(6)/MCL (ref 4.7–6.1)
SODIUM SERPL-SCNC: 133 MMOL/L (ref 136–145)
WBC # SPEC AUTO: 9.1 X10(3)/MCL (ref 4.5–11.5)

## 2022-12-11 PROCEDURE — 21400001 HC TELEMETRY ROOM

## 2022-12-11 PROCEDURE — 25000003 PHARM REV CODE 250: Performed by: NURSE PRACTITIONER

## 2022-12-11 PROCEDURE — 85025 COMPLETE CBC W/AUTO DIFF WBC: CPT | Performed by: PHYSICIAN ASSISTANT

## 2022-12-11 PROCEDURE — 11000001 HC ACUTE MED/SURG PRIVATE ROOM

## 2022-12-11 PROCEDURE — 63600175 PHARM REV CODE 636 W HCPCS: Performed by: PHYSICIAN ASSISTANT

## 2022-12-11 PROCEDURE — 63600175 PHARM REV CODE 636 W HCPCS: Performed by: NURSE PRACTITIONER

## 2022-12-11 PROCEDURE — 80053 COMPREHEN METABOLIC PANEL: CPT | Performed by: PHYSICIAN ASSISTANT

## 2022-12-11 PROCEDURE — 25000003 PHARM REV CODE 250: Performed by: PHYSICIAN ASSISTANT

## 2022-12-11 RX ORDER — VALSARTAN 80 MG/1
80 TABLET ORAL DAILY
Status: DISCONTINUED | OUTPATIENT
Start: 2022-12-11 | End: 2022-12-13 | Stop reason: HOSPADM

## 2022-12-11 RX ORDER — IBUPROFEN 400 MG/1
400 TABLET ORAL 3 TIMES DAILY
Status: DISCONTINUED | OUTPATIENT
Start: 2022-12-11 | End: 2022-12-13 | Stop reason: HOSPADM

## 2022-12-11 RX ADMIN — IBUPROFEN 400 MG: 200 TABLET, FILM COATED ORAL at 09:12

## 2022-12-11 RX ADMIN — AZITHROMYCIN MONOHYDRATE 500 MG: 500 INJECTION, POWDER, LYOPHILIZED, FOR SOLUTION INTRAVENOUS at 11:12

## 2022-12-11 RX ADMIN — METOPROLOL SUCCINATE 25 MG: 25 TABLET, EXTENDED RELEASE ORAL at 09:12

## 2022-12-11 RX ADMIN — CEFTRIAXONE SODIUM 1 G: 1 INJECTION, POWDER, FOR SOLUTION INTRAMUSCULAR; INTRAVENOUS at 12:12

## 2022-12-11 RX ADMIN — RIVAROXABAN 20 MG: 10 TABLET, FILM COATED ORAL at 05:12

## 2022-12-11 RX ADMIN — VALSARTAN 80 MG: 80 TABLET, FILM COATED ORAL at 11:12

## 2022-12-11 RX ADMIN — IBUPROFEN 400 MG: 400 TABLET, FILM COATED ORAL at 03:12

## 2022-12-11 RX ADMIN — IBUPROFEN 400 MG: 400 TABLET, FILM COATED ORAL at 10:12

## 2022-12-11 RX ADMIN — METOPROLOL SUCCINATE 25 MG: 25 TABLET, EXTENDED RELEASE ORAL at 10:12

## 2022-12-11 RX ADMIN — FLECAINIDE ACETATE 50 MG: 50 TABLET ORAL at 09:12

## 2022-12-11 RX ADMIN — PANTOPRAZOLE SODIUM 40 MG: 40 TABLET, DELAYED RELEASE ORAL at 09:12

## 2022-12-11 RX ADMIN — FLECAINIDE ACETATE 50 MG: 50 TABLET ORAL at 10:12

## 2022-12-11 RX ADMIN — AMLODIPINE BESYLATE 5 MG: 5 TABLET ORAL at 09:12

## 2022-12-11 RX ADMIN — ENOXAPARIN SODIUM 120 MG: 150 INJECTION SUBCUTANEOUS at 12:12

## 2022-12-11 NOTE — NURSING
Nurses Note -- 4 Eyes      12/11/2022   11:47 AM      Skin assessed during: Admit      [x] No Pressure Injuries Present    []Prevention Measures Documented      [] Yes- Altered Skin Integrity Present or Discovered   [] LDA Added if Not in Epic (Describe Wound)   [] New Altered Skin Integrity was Present on Admit and Documented in LDA   [] Wound Image Taken    Wound Care Consulted? No    Attending Nurse:  Enedina Love RN     Second RN/Staff Member:  Junie Curry RN

## 2022-12-11 NOTE — PROGRESS NOTES
[facility] - Emergency Dept  Cardiology  Progress Note    Patient Name: Serge Prado  MRN: [id number]  Admission Date: 12/10/2022  Hospital Length of Stay: 1 days  Code Status: No Order   Attending Provider: Sergio Johnson MD   Consulting Provider: LATISHA William  Primary Care Physician: Marcie Prado MD  Principal Problem:<principal problem not specified>    Patient information was obtained from patient, past medical records, and ER records.     Subjective:     Chief Complaint:  chest pain/SOB     HPI:   Mr. Prado is a 68y/o male, followed by Dr. Cary/Bri, who recently underwent EPS with RFA for persistent Afib on 12/8/22 presented to hospital with c/o 1 day hx of chest pain radiating to right jaw with associated SOB. Denies N/V/diaphoresis. Workup revealed D-dimer 0.59, Troponin 1.335. Negative COVID/Influenza A/B. EKG revealing SR with 1st degree AVB. CTA chest negative PE but is suggestive of pneumonia. He has been started on antibiotics and admitted to Hospitalist. [facility] has been consulted for chest pain and elevated troponin    Hospital Course:  12/11/22: Patient reporting improvement in chest pain. Echo revealing normal EF without pericardial effusion.     PMH: Persistent AF/xarelto, HTN, HLD, GERD  PSH: EPS with RFA, shoulder arthroscopy, KETTY/DCCV  Family History: Mother- MI-reason for death age 82  Social History: Never smoker, occasional ETOH      Previous Cardiac Diagnostics:   Ablation 12/8/2022:  Successful Pulmonary vein RF ablation .Posterior wall isolation. Cardioversion. NSR without significant arrhythmia.     KETTY 12/08/2022:  LV normal in size with normal systolic function. A diastolic pattern with afib observed. Normal RV size with normal RV systolic function. Moderate LA enlargement. No interatrial septal defect present. Normal appearing DIAMOND. No thrombus present in appendage. Mild MRLucero    YENI 09/28/22:  Normal perfusion study. No perfusion defects noted. Stress  LVEF 52% and LV global function is normal. Rest LV cavity is normal with rest EF 53% and normal LV global function.       Review of patient's allergies indicates:   Allergen Reactions    Morphine Rash       No current facility-administered medications on file prior to encounter.     Current Outpatient Medications on File Prior to Encounter   Medication Sig    amlodipine-olmesartan (ANGI) 10-40 mg per tablet Take 1 tablet by mouth once daily (Patient taking differently: Take 0.5 tablets by mouth once daily.)    cholecalciferol, vitamin D3, (VITAMIN D3) 25 mcg (1,000 unit) capsule Take 2,000 Units by mouth once daily.    flecainide (TAMBOCOR) 50 MG Tab Take 50 mg by mouth 2 (two) times a day.    folic acid/multivit-min/lutein (CENTRUM SILVER ORAL) Take 1 tablet by mouth once daily.    hydroCHLOROthiazide (HYDRODIURIL) 25 MG tablet Take 1 tablet by mouth once daily (Patient taking differently: Take 12.5 mg by mouth once daily.)    metoprolol succinate (TOPROL-XL) 25 MG 24 hr tablet Take 25 mg by mouth 2 (two) times daily.    pantoprazole (PROTONIX) 40 MG tablet Take 1 tablet (40 mg total) by mouth once daily.    XARELTO 20 mg Tab Take 1 tablet by mouth once daily.           Review of Systems   Respiratory:  Negative for shortness of breath.    Cardiovascular:  Negative for chest pain.   Gastrointestinal: Negative.    Genitourinary: Negative.    Musculoskeletal: Negative.    Skin: Negative.    Neurological: Negative.    Psychiatric/Behavioral: Negative.       Objective:     Vital Signs (Most Recent):  Temp: 98.2 °F (36.8 °C) (12/11/22 0700)  Pulse: 77 (12/11/22 0900)  Resp: (!) 23 (12/11/22 0900)  BP: (!) 166/75 (12/11/22 0922)  SpO2: (!) 94 % (12/11/22 0900)   Vital Signs (24h Range):  Temp:  [98.2 °F (36.8 °C)-98.9 °F (37.2 °C)] 98.2 °F (36.8 °C)  Pulse:  [60-89] 77  Resp:  [16-26] 23  SpO2:  [91 %-97 %] 94 %  BP: (137-178)/() 166/75     Weight: 117.9 kg (259 lb 14.8 oz)  Body mass index is 38.38 kg/m².    SpO2:  (!) 94 %         Intake/Output Summary (Last 24 hours) at 12/11/2022 0949  Last data filed at 12/10/2022 2122  Gross per 24 hour   Intake --   Output 1250 ml   Net -1250 ml       Lines/Drains/Airways       Peripheral Intravenous Line  Duration                  Peripheral IV - Single Lumen 12/10/22 20 G Left Antecubital 1 day         Peripheral IV - Single Lumen 12/10/22 1032 20 G Left Shoulder <1 day                    Significant Labs:  Recent Results (from the past 72 hour(s))   Troponin I #1    Collection Time: 12/10/22  8:21 AM   Result Value Ref Range    Troponin-I 1.335 (H) 0.000 - 0.045 ng/mL   B-Type natriuretic peptide (BNP)    Collection Time: 12/10/22  8:21 AM   Result Value Ref Range    Natriuretic Peptide 91.0 <=100.0 pg/mL   CBC with Differential    Collection Time: 12/10/22  8:21 AM   Result Value Ref Range    WBC      RBC      Hgb      Hct      MCV      MCH      MCHC      RDW      Platelet      MPV      Neut %      Lymph %      Mono %      Eos %      Basophil %      Lymph #      Neut #      Mono #      Eos #      Baso #      IG#      IG%      NRBC%      IPF     Protime-INR    Collection Time: 12/10/22  8:21 AM   Result Value Ref Range    PT 21.3 (H) 12.5 - 14.5 seconds    INR 1.88 (H) 0.00 - 1.30   APTT    Collection Time: 12/10/22  8:21 AM   Result Value Ref Range    PTT 42.1 (H) 23.2 - 33.7 seconds   D dimer, quantitative    Collection Time: 12/10/22  8:21 AM   Result Value Ref Range    D-Dimer 0.59 (H) 0.00 - 0.50 ug/mL FEU   Lactic acid, plasma    Collection Time: 12/10/22  8:21 AM   Result Value Ref Range    Lactic Acid Level 1.4 0.5 - 2.2 mmol/L   COVID/FLU A&B PCR    Collection Time: 12/10/22  8:25 AM   Result Value Ref Range    Influenza A PCR Not Detected Not Detected    Influenza B PCR Not Detected Not Detected    SARS-CoV-2 PCR Not Detected Not Detected   CBC with Differential    Collection Time: 12/10/22  9:05 AM   Result Value Ref Range    WBC 10.0 4.5 - 11.5 x10(3)/mcL    RBC 3.73 (L)  4.70 - 6.10 x10(6)/mcL    Hgb 12.6 (L) 14.0 - 18.0 gm/dL    Hct 38.4 (L) 42.0 - 52.0 %    .9 (H) 80.0 - 94.0 fL    MCH 33.8 (H) 27.0 - 31.0 pg    MCHC 32.8 (L) 33.0 - 36.0 mg/dL    RDW 12.2 11.5 - 17.0 %    Platelet 171 130 - 400 x10(3)/mcL    MPV 10.2 7.4 - 10.4 fL    Neut % 73.7 %    Lymph % 14.7 %    Mono % 9.6 %    Eos % 0.9 %    Basophil % 0.5 %    Lymph # 1.48 0.6 - 4.6 x10(3)/mcL    Neut # 7.4 2.1 - 9.2 x10(3)/mcL    Mono # 0.96 0.1 - 1.3 x10(3)/mcL    Eos # 0.09 0 - 0.9 x10(3)/mcL    Baso # 0.05 0 - 0.2 x10(3)/mcL    IG# 0.06 (H) 0 - 0.04 x10(3)/mcL    IG% 0.6 %    NRBC% 0.0 %   Comprehensive metabolic panel    Collection Time: 12/10/22  9:49 AM   Result Value Ref Range    Sodium Level 134 (L) 136 - 145 mmol/L    Potassium Level 3.7 3.5 - 5.1 mmol/L    Chloride 101 98 - 107 mmol/L    Carbon Dioxide 25 23 - 31 mmol/L    Glucose Level 105 82 - 115 mg/dL    Blood Urea Nitrogen 12.8 8.4 - 25.7 mg/dL    Creatinine 0.85 0.73 - 1.18 mg/dL    Calcium Level Total 8.5 (L) 8.8 - 10.0 mg/dL    Protein Total 6.6 5.8 - 7.6 gm/dL    Albumin Level 3.8 3.4 - 4.8 gm/dL    Globulin 2.8 2.4 - 3.5 gm/dL    Albumin/Globulin Ratio 1.4 1.1 - 2.0 ratio    Bilirubin Total 1.2 <=1.5 mg/dL    Alkaline Phosphatase 69 40 - 150 unit/L    Alanine Aminotransferase 35 0 - 55 unit/L    Aspartate Aminotransferase 39 (H) 5 - 34 unit/L    eGFR >60 mls/min/1.73/m2   Troponin I    Collection Time: 12/10/22  9:49 AM   Result Value Ref Range    Troponin-I 1.238 (H) 0.000 - 0.045 ng/mL   Urinalysis, Reflex to Urine Culture Urine, Clean Catch    Collection Time: 12/10/22 10:06 AM    Specimen: Urine   Result Value Ref Range    Color, UA Yellow Yellow, Light-Yellow, Dark Yellow, Nova, Straw    Appearance, UA Clear Clear    Specific Gravity, UA 1.034 (H) 1.001 - 1.030    pH, UA 8.0 5.0 - 8.5    Protein, UA Negative Negative mg/dL    Glucose, UA Negative Negative, Normal mg/dL    Ketones, UA Negative Negative mg/dL    Blood, UA Trace (A) Negative  unit/L    Bilirubin, UA Negative Negative mg/dL    Urobilinogen, UA 1.0 0.2, 1.0, Normal mg/dL    Nitrites, UA Negative Negative    Leukocyte Esterase, UA Negative Negative unit/L   Urinalysis, Microscopic    Collection Time: 12/10/22 10:06 AM   Result Value Ref Range    RBC, UA 10 (H) <=5 /HPF    WBC, UA <5 <=5 /HPF    Squamous Epithelial Cells, UA <5 <=5 /HPF    Bacteria, UA None Seen None Seen, Rare, Occasional /HPF   Echo    Collection Time: 12/10/22 10:56 AM   Result Value Ref Range    BSA 2.4 m2    TDI SEPTAL 0.11 m/s    LV LATERAL E/E' RATIO 8.55 m/s    LV SEPTAL E/E' RATIO 8.55 m/s    Right Atrial Pressure (from IVC) 3 mmHg    EF 56 %    Left Ventricular Outflow Tract Mean Velocity 0.61 cm/s    Left Ventricular Outflow Tract Mean Gradient 2.00 mmHg    TDI LATERAL 0.11 m/s    PV PEAK VELOCITY 0.96 cm/s    LVIDd 4.87 3.5 - 6.0 cm    IVS 1.11 (A) 0.6 - 1.1 cm    Posterior Wall 1.12 (A) 0.6 - 1.1 cm    LVIDs 3.53 2.1 - 4.0 cm    FS 28 28 - 44 %    LV mass 202.28 g    LA size 4.50 cm    TAPSE 2.53 cm    Left Ventricle Relative Wall Thickness 0.46 cm    AV mean gradient 6 mmHg    AV valve area 1.73 cm2    AV Velocity Ratio 0.56     AV index (prosthetic) 0.55     MV mean gradient 4 mmHg    MV valve area p 1/2 method 3.49 cm2    MV valve area by continuity eq 1.84 cm2    E/A ratio 1.18     Mean e' 0.11 m/s    E wave deceleration time 201.00 msec    LVOT diameter 2.00 cm    LVOT area 3.1 cm2    LVOT peak peewee 0.90 m/s    LVOT peak VTI 17.00 cm    Ao peak peewee 1.60 m/s    Ao VTI 30.8 cm    LVOT stroke volume 53.38 cm3    AV peak gradient 10 mmHg    MV peak gradient 7 mmHg    TV rest pulmonary artery pressure 38 mmHg    E/E' ratio 8.55 m/s    MV Peak E Peewee 0.94 m/s    TR Max Peewee 2.94 m/s    MV VTI 29.0 cm    MV stenosis pressure 1/2 time 63.00 ms    MV Peak A Peewee 0.80 m/s    LV Systolic Volume 51.90 mL    LV Systolic Volume Index 22.5 mL/m2    LV Diastolic Volume 111.00 mL    LV Diastolic Volume Index 48.05 mL/m2    LV  Mass Index 88 g/m2    Triscuspid Valve Regurgitation Peak Gradient 35 mmHg    LA Volume Index (Mod) 40.2 mL/m2    LA volume (mod) 92.80 cm3   Troponin I    Collection Time: 12/10/22 10:36 PM   Result Value Ref Range    Troponin-I 0.633 (H) 0.000 - 0.045 ng/mL   Comprehensive Metabolic Panel    Collection Time: 12/11/22  4:44 AM   Result Value Ref Range    Sodium Level 133 (L) 136 - 145 mmol/L    Potassium Level 3.6 3.5 - 5.1 mmol/L    Chloride 101 98 - 107 mmol/L    Carbon Dioxide 22 (L) 23 - 31 mmol/L    Glucose Level 103 82 - 115 mg/dL    Blood Urea Nitrogen 10.1 8.4 - 25.7 mg/dL    Creatinine 0.79 0.73 - 1.18 mg/dL    Calcium Level Total 8.4 (L) 8.8 - 10.0 mg/dL    Protein Total 6.3 5.8 - 7.6 gm/dL    Albumin Level 3.4 3.4 - 4.8 gm/dL    Globulin 2.9 2.4 - 3.5 gm/dL    Albumin/Globulin Ratio 1.2 1.1 - 2.0 ratio    Bilirubin Total 1.2 <=1.5 mg/dL    Alkaline Phosphatase 68 40 - 150 unit/L    Alanine Aminotransferase 25 0 - 55 unit/L    Aspartate Aminotransferase 29 5 - 34 unit/L    eGFR >60 mls/min/1.73/m2   CBC with Differential    Collection Time: 12/11/22  4:44 AM   Result Value Ref Range    WBC 9.1 4.5 - 11.5 x10(3)/mcL    RBC 3.50 (L) 4.70 - 6.10 x10(6)/mcL    Hgb 11.9 (L) 14.0 - 18.0 gm/dL    Hct 34.9 (L) 42.0 - 52.0 %    MCV 99.7 (H) 80.0 - 94.0 fL    MCH 34.0 (H) 27.0 - 31.0 pg    MCHC 34.1 33.0 - 36.0 mg/dL    RDW 12.0 11.5 - 17.0 %    Platelet 159 130 - 400 x10(3)/mcL    MPV 10.1 7.4 - 10.4 fL    Neut % 79.0 %    Lymph % 10.7 %    Mono % 7.1 %    Eos % 2.1 %    Basophil % 0.4 %    Lymph # 0.97 0.6 - 4.6 x10(3)/mcL    Neut # 7.2 2.1 - 9.2 x10(3)/mcL    Mono # 0.64 0.1 - 1.3 x10(3)/mcL    Eos # 0.19 0 - 0.9 x10(3)/mcL    Baso # 0.04 0 - 0.2 x10(3)/mcL    IG# 0.06 (H) 0 - 0.04 x10(3)/mcL    IG% 0.7 %    NRBC% 0.0 %       Significant Imaging:  Imaging Results              CTA Chest Non-Coronary (PE Studies) (Final result)  Result time 12/10/22 09:41:56      Final result by Alec Car MD (12/10/22  09:41:56)                   Impression:      1. No pulmonary embolism identified.  2. Patchy small areas of right lung consolidation with 9 mm solid left lower lobe nodule.  Appearance is suggestive of pneumonia.  2-3 month follow-up chest CT can be considered to confirm resolution.      Electronically signed by: Alec Car  Date:    12/10/2022  Time:    09:41               Narrative:    EXAMINATION:  CTA CHEST NON CORONARY (PE STUDIES)    CLINICAL HISTORY:  Pulmonary embolism (PE) suspected, positive D-dimer;    TECHNIQUE:  CTA imaging of the chest after the administration of 100 mL Isovue-370 IV contrast. Axial, coronal and sagittal reconstructions, including MIP images, are reviewed. Dose length product 442 mGycm. Automatic exposure control, adjustment of mA/kV or iterative reconstruction technique used to limit radiation dose.    COMPARISON:  No relevant comparison studies available at the time of dictation.    FINDINGS:  Diagnostic quality: Adequate    Pulmonary embolism: None identified.    Lung parenchyma: Patchy areas of consolidation in the posterior portion of the right upper lobe and superior portion of the right lower lobe.  Largest area of consolidation measures about 3.5 cm in transverse diameter.  9 mm solid nodule in the superior portion of the left lower lobe (series 7, image 39).  Additional dependent consolidation in the lung bases.    Pleural effusion: Trace bilateral pleural effusions.    Mediastinum/lalo: Heart size upper normal with mild coronary artery and aortic calcification.  Single nonspecific borderline enlarged right paratracheal lymph node measuring 10 mm in short axis.    Chest wall/axilla: No significant findings.    Upper abdomen: No significant findings.    Bones: No acute osseous process.                                       X-Ray Chest 1 View (Final result)  Result time 12/10/22 09:25:46      Final result by Alec Car MD (12/10/22 09:25:46)                    Impression:      No acute pulmonary process identified.      Electronically signed by: Alec Car  Date:    12/10/2022  Time:    09:25               Narrative:    EXAMINATION:  XR CHEST 1 VIEW    CLINICAL HISTORY:  Chest pain, unspecified    TECHNIQUE:  Frontal view(s) of the chest.    COMPARISON:  No relevant comparison studies available at the time of dictation.    FINDINGS:  Normal cardiac silhouette.  The lungs are well-inflated.  No consolidation identified.  No significant pleural effusion or discernible pneumothorax.                                        Physical Exam  HENT:      Mouth/Throat:      Mouth: Mucous membranes are moist.   Cardiovascular:      Rate and Rhythm: Normal rate and regular rhythm.      Pulses: Normal pulses.      Heart sounds: Normal heart sounds.   Pulmonary:      Effort: Pulmonary effort is normal.      Breath sounds: Normal breath sounds.   Abdominal:      General: Abdomen is flat.      Palpations: Abdomen is soft.   Musculoskeletal:         General: Normal range of motion.   Skin:     General: Skin is warm.      Capillary Refill: Capillary refill takes less than 2 seconds.   Neurological:      General: No focal deficit present.      Mental Status: He is alert.   Psychiatric:         Mood and Affect: Mood normal.       Home Medications:   No current facility-administered medications on file prior to encounter.     Current Outpatient Medications on File Prior to Encounter   Medication Sig Dispense Refill    amlodipine-olmesartan (ANGI) 10-40 mg per tablet Take 1 tablet by mouth once daily (Patient taking differently: Take 0.5 tablets by mouth once daily.) 90 tablet 2    cholecalciferol, vitamin D3, (VITAMIN D3) 25 mcg (1,000 unit) capsule Take 2,000 Units by mouth once daily.      flecainide (TAMBOCOR) 50 MG Tab Take 50 mg by mouth 2 (two) times a day.      folic acid/multivit-min/lutein (CENTRUM SILVER ORAL) Take 1 tablet by mouth once daily.      hydroCHLOROthiazide  (HYDRODIURIL) 25 MG tablet Take 1 tablet by mouth once daily (Patient taking differently: Take 12.5 mg by mouth once daily.) 90 tablet 2    metoprolol succinate (TOPROL-XL) 25 MG 24 hr tablet Take 25 mg by mouth 2 (two) times daily.      pantoprazole (PROTONIX) 40 MG tablet Take 1 tablet (40 mg total) by mouth once daily. 30 tablet 0    XARELTO 20 mg Tab Take 1 tablet by mouth once daily.         Current Inpatient Medications:    Current Facility-Administered Medications:     acetaminophen tablet 650 mg, 650 mg, Oral, Q4H PRN, Viviane Conklin PA-C    amLODIPine tablet 5 mg, 5 mg, Oral, Daily, JENNIFER WilliamP, 5 mg at 12/11/22 0922    azithromycin 500 mg in dextrose 5 % 250 mL IVPB (ready to mix system), 500 mg, Intravenous, Q24H, Viviane Conklin PA-C    cefTRIAXone (ROCEPHIN) 1 g in dextrose 5 % in water (D5W) 5 % 50 mL IVPB (MB+), 1 g, Intravenous, Q24H, Viviane Conklin PA-C    enoxaparin injection 120 mg, 1 mg/kg, Subcutaneous, Q12H, JENNIFER WilliamP, 120 mg at 12/11/22 0006    flecainide tablet 50 mg, 50 mg, Oral, BID, Aileen Lozoya, FNP, 50 mg at 12/11/22 0922    hydrALAZINE injection 10 mg, 10 mg, Intravenous, Q2H PRN, Viviane Conklin PA-C    metoprolol succinate (TOPROL-XL) 24 hr tablet 25 mg, 25 mg, Oral, BID, Aileen Lozoya, FNP, 25 mg at 12/11/22 0922    ondansetron injection 4 mg, 4 mg, Intravenous, Q6H PRN, Viviane Conklin PA-C    pantoprazole EC tablet 40 mg, 40 mg, Oral, Daily, Aileen Lozoya, FNP, 40 mg at 12/11/22 0922    valsartan tablet 40 mg, 40 mg, Oral, Daily, LATISHA William    Current Outpatient Medications:     amlodipine-olmesartan (ANGI) 10-40 mg per tablet, Take 1 tablet by mouth once daily (Patient taking differently: Take 0.5 tablets by mouth once daily.), Disp: 90 tablet, Rfl: 2    cholecalciferol, vitamin D3, (VITAMIN D3) 25 mcg (1,000 unit) capsule, Take 2,000 Units by mouth once daily., Disp: , Rfl:     flecainide (TAMBOCOR) 50 MG Tab,  Take 50 mg by mouth 2 (two) times a day., Disp: , Rfl:     folic acid/multivit-min/lutein (CENTRUM SILVER ORAL), Take 1 tablet by mouth once daily., Disp: , Rfl:     hydroCHLOROthiazide (HYDRODIURIL) 25 MG tablet, Take 1 tablet by mouth once daily (Patient taking differently: Take 12.5 mg by mouth once daily.), Disp: 90 tablet, Rfl: 2    metoprolol succinate (TOPROL-XL) 25 MG 24 hr tablet, Take 25 mg by mouth 2 (two) times daily., Disp: , Rfl:     pantoprazole (PROTONIX) 40 MG tablet, Take 1 tablet (40 mg total) by mouth once daily., Disp: 30 tablet, Rfl: 0    XARELTO 20 mg Tab, Take 1 tablet by mouth once daily., Disp: , Rfl:          VTE Risk Mitigation (From admission, onward)           Ordered     enoxaparin injection 120 mg  Every 12 hours (non-standard times)         12/10/22 1108                    Assessment:   Chest pain (resolved with NSAIDS - no pericardial effusion)  Elevated troponin  --likely due to recent ablation  --troponin 1.335-1.238-0.633  --Normal PET 9/22  Pneumonia (confirmed by CT - no PE)  --on ABX  HTN  HLD  GERD  Hx pAF  --s/p EPS with RFA on 12/8/22; maintaining NSR  --on xarelto/flecainide    Plan:     Continue Ibuprofen 400mg TID x 1 week. Add Protonix 40 mg daily  Primary to treat pneumonia  BP remains above goal. Continue amlodipine 5mg daily and metoprolol succinate 25 mg bid. Increase valsartan to 80 mg daily  Continue flecainide and Metoprolol succinate  Resume xarelto 20 mg daily  Okay to discharge home from a cardiology perspective      Thank you for your consult. [facility] signing off. OK to DC home once ok with primary    LATISHA William  Cardiology  [facility] -   12/11/2022       Patient seen independent of the NP  Plan of care determined by me and patient agreeable    Elias Prado MD

## 2022-12-12 PROCEDURE — 25000003 PHARM REV CODE 250: Performed by: PHYSICIAN ASSISTANT

## 2022-12-12 PROCEDURE — 25000003 PHARM REV CODE 250: Performed by: NURSE PRACTITIONER

## 2022-12-12 PROCEDURE — 63600175 PHARM REV CODE 636 W HCPCS: Performed by: PHYSICIAN ASSISTANT

## 2022-12-12 PROCEDURE — 21400001 HC TELEMETRY ROOM

## 2022-12-12 RX ADMIN — IBUPROFEN 400 MG: 400 TABLET, FILM COATED ORAL at 09:12

## 2022-12-12 RX ADMIN — FLECAINIDE ACETATE 50 MG: 50 TABLET ORAL at 09:12

## 2022-12-12 RX ADMIN — AMLODIPINE BESYLATE 5 MG: 5 TABLET ORAL at 09:12

## 2022-12-12 RX ADMIN — VALSARTAN 80 MG: 80 TABLET, FILM COATED ORAL at 09:12

## 2022-12-12 RX ADMIN — CEFTRIAXONE SODIUM 1 G: 1 INJECTION, POWDER, FOR SOLUTION INTRAMUSCULAR; INTRAVENOUS at 12:12

## 2022-12-12 RX ADMIN — IBUPROFEN 400 MG: 400 TABLET, FILM COATED ORAL at 08:12

## 2022-12-12 RX ADMIN — FLECAINIDE ACETATE 50 MG: 50 TABLET ORAL at 08:12

## 2022-12-12 RX ADMIN — IBUPROFEN 400 MG: 400 TABLET, FILM COATED ORAL at 02:12

## 2022-12-12 RX ADMIN — RIVAROXABAN 20 MG: 10 TABLET, FILM COATED ORAL at 05:12

## 2022-12-12 RX ADMIN — METOPROLOL SUCCINATE 25 MG: 25 TABLET, EXTENDED RELEASE ORAL at 09:12

## 2022-12-12 RX ADMIN — PANTOPRAZOLE SODIUM 40 MG: 40 TABLET, DELAYED RELEASE ORAL at 09:12

## 2022-12-13 VITALS
OXYGEN SATURATION: 96 % | HEIGHT: 69 IN | HEART RATE: 67 BPM | RESPIRATION RATE: 18 BRPM | SYSTOLIC BLOOD PRESSURE: 178 MMHG | WEIGHT: 265.88 LBS | DIASTOLIC BLOOD PRESSURE: 78 MMHG | BODY MASS INDEX: 39.38 KG/M2 | TEMPERATURE: 99 F

## 2022-12-13 PROBLEM — R07.9 CHEST PAIN: Status: RESOLVED | Noted: 2022-12-13 | Resolved: 2022-12-13

## 2022-12-13 PROBLEM — R07.9 CHEST PAIN: Status: ACTIVE | Noted: 2022-12-13

## 2022-12-13 PROBLEM — I21.4 NSTEMI (NON-ST ELEVATED MYOCARDIAL INFARCTION): Status: RESOLVED | Noted: 2022-12-13 | Resolved: 2022-12-13

## 2022-12-13 PROBLEM — I21.4 NSTEMI (NON-ST ELEVATED MYOCARDIAL INFARCTION): Status: ACTIVE | Noted: 2022-12-13

## 2022-12-13 PROBLEM — J18.9 CAP (COMMUNITY ACQUIRED PNEUMONIA): Status: ACTIVE | Noted: 2022-12-13

## 2022-12-13 PROBLEM — J18.9 PNEUMONIA: Status: ACTIVE | Noted: 2022-12-13

## 2022-12-13 PROCEDURE — 25000003 PHARM REV CODE 250: Performed by: NURSE PRACTITIONER

## 2022-12-13 PROCEDURE — 63600175 PHARM REV CODE 636 W HCPCS: Performed by: INTERNAL MEDICINE

## 2022-12-13 PROCEDURE — 25000003 PHARM REV CODE 250: Performed by: INTERNAL MEDICINE

## 2022-12-13 RX ORDER — BENZONATATE 100 MG/1
100 CAPSULE ORAL 3 TIMES DAILY PRN
Qty: 15 CAPSULE | Refills: 0 | Status: SHIPPED | OUTPATIENT
Start: 2022-12-13 | End: 2022-12-18

## 2022-12-13 RX ORDER — AZITHROMYCIN 500 MG/1
500 TABLET, FILM COATED ORAL DAILY
Qty: 3 TABLET | Refills: 0 | Status: SHIPPED | OUTPATIENT
Start: 2022-12-13 | End: 2023-02-27

## 2022-12-13 RX ADMIN — VALSARTAN 80 MG: 80 TABLET, FILM COATED ORAL at 11:12

## 2022-12-13 RX ADMIN — METOPROLOL SUCCINATE 25 MG: 25 TABLET, EXTENDED RELEASE ORAL at 11:12

## 2022-12-13 RX ADMIN — AMLODIPINE BESYLATE 5 MG: 5 TABLET ORAL at 11:12

## 2022-12-13 RX ADMIN — PANTOPRAZOLE SODIUM 40 MG: 40 TABLET, DELAYED RELEASE ORAL at 11:12

## 2022-12-13 RX ADMIN — IBUPROFEN 400 MG: 400 TABLET, FILM COATED ORAL at 11:12

## 2022-12-13 RX ADMIN — AZITHROMYCIN MONOHYDRATE 500 MG: 500 INJECTION, POWDER, LYOPHILIZED, FOR SOLUTION INTRAVENOUS at 05:12

## 2022-12-13 RX ADMIN — CEFTRIAXONE SODIUM 1 G: 1 INJECTION, POWDER, FOR SOLUTION INTRAMUSCULAR; INTRAVENOUS at 11:12

## 2022-12-13 NOTE — PROGRESS NOTES
[facility]  Hospital Medicine Progress Note    LATE ENTRY/PROGRESS NOTE FOR 12-11-22    Chief Complaint: Inpatient Follow-up for Chest Pain (Patient reports chest pain and SOB since yesterday, ablation done 12/8 Dr Cary)    HPI: Serge Prado is a 67 y.o. White male with a past medical history of hypertension, hyperlipidemia, atrial fibrillation on Xarelto status post ablation on 12/08/2022 with Dr. Cary, GERD and back pain. The patient presented to [facility] on 12/10/2022 with a primary complaint of chest pain that began yesterday afternoon (12/09/2022) at 1:00 PM while sitting down.  Patient reports chest pain is located to the right upper chest underneath his collarbone, initially constant, described as dullness without radiation.  Reports associated shortness a breath with exertion and a nonproductive cough and the sweats. He currently rates chest pain 5/10 on exam.  He denies complaints of abdominal pain, nausea, vomiting and diarrhea.         Upon presentation to the ED, temperature a 100.4° F, heart rate 84, blood pressure 154/74 and SpO2 98% on room air.  Labs with H&H 12.6/38.4, , BNP 91, troponin 1.335, D-dimer 0.59.  CMP pending.  Influenza A and B and SARS-CoV-2 PCR negative.  UA negative for infection.  EKG sinus rhythm with first-degree AV block.  Chest x-ray without acute pulmonary process.  CTA of the chest without pulmonary embolism but patchy small areas of right lung consolidation with 9 mm solid left lower lobe nodule suggestive of pneumonia.  While in ED patient received Tylenol, full-dose aspirin, azithromycin and Rocephin.  Cardiology consulted.  Patient is admitted to hospital medicine services for further medical management.    Interval Hx:     12/11/22 no new issues , feels fine. Still w cough       Objective/physical exam:  General appearance: Well-developed, well-nourished male in no apparent distress.   at bedside.    HEENT: Atraumatic head.  Moist mucous membranes of oral cavity.    Lungs: Clear to auscultation bilaterally.  Rhonchi to left lower lung.    Heart: Regular rate and rhythm.  No edema.    Abdomen: Obese, soft, non-distended,.    Extremities: No cyanosis, clubbing. No deformities.    Skin: No Rash. Warm and dry.    Neuro: Awake, alert and oriented. Motor and sensory exams grossly intact.    Psych/mental status: Appropriate mood and affect. Cooperative. Responds appropriately to questions.    VITAL SIGNS: 24 HRS MIN & MAX LAST   Temp  Min: 97.7 °F (36.5 °C)  Max: 98.6 °F (37 °C) 97.7 °F (36.5 °C)   BP  Min: 147/71  Max: 169/72 (!) 161/73     Pulse  Min: 57  Max: 72  (!) 57     Resp  Min: 15  Max: 20 18   SpO2  Min: 92 %  Max: 96 % (!) 94 %         Recent Labs   Lab 12/07/22  0640 12/10/22  0905 12/11/22  0444   WBC 7.0 10.0 9.1   RBC 4.23* 3.73* 3.50*   HGB 14.2 12.6* 11.9*   HCT 42.0 38.4* 34.9*   MCV 99.3* 102.9* 99.7*   MCH 33.6* 33.8* 34.0*   MCHC 33.8 32.8* 34.1   RDW 11.8 12.2 12.0    171 159   MPV 9.9 10.2 10.1       Recent Labs   Lab 12/07/22  0640 12/10/22  0949 12/11/22  0444    134* 133*   K 4.5 3.7 3.6   CO2 21* 25 22*   BUN 15.6 12.8 10.1   CREATININE 0.93 0.85 0.79   CALCIUM 9.6 8.5* 8.4*   ALBUMIN  --  3.8 3.4   ALKPHOS  --  69 68   ALT  --  35 25   AST  --  39* 29   BILITOT  --  1.2 1.2          Microbiology Results (last 7 days)       Procedure Component Value Units Date/Time    Blood culture x two cultures. Draw prior to antibiotics. [[id number]]  (Normal) Collected: 12/10/22 1036    Order Status: Completed Specimen: Blood Updated: 12/12/22 1200     CULTURE, BLOOD ([facility]) No Growth At 48 Hours    Blood culture x two cultures. Draw prior to antibiotics. [[id number]]  (Normal) Collected: 12/10/22 1036    Order Status: Completed Specimen: Blood Updated: 12/12/22 1200     CULTURE, BLOOD ([facility]) No Growth At 48 Hours             See below for Radiology    Scheduled Med:   amLODIPine  5 mg Oral Daily    azithromycin  500 mg  Intravenous Q24H    cefTRIAXone (ROCEPHIN) IVPB  1 g Intravenous Q24H    flecainide  50 mg Oral BID    ibuprofen  400 mg Oral TID    metoprolol succinate  25 mg Oral BID    pantoprazole  40 mg Oral Daily    rivaroxaban  20 mg Oral Daily with dinner    valsartan  80 mg Oral Daily        Continuous Infusions:       PRN Meds:  acetaminophen, hydrALAZINE, ondansetron       Assessment/Plan:  Acute chest pain   NSTEMI type 2 secondary to recent ablation  Community-acquired pneumonia  Macrocytic anemia, stable   Elevated D-dimer   Essential hypertension  Hyperlipidemia  Atrial fibrillation on Xarelto status post ablation 12/08/2022      Plan - continue present management     VTE prophylaxis:     Patient condition:  Stable/    Anticipated discharge and Disposition:         All diagnosis and differential diagnosis have been reviewed; assessment and plan has been documented; I have personally reviewed the labs and test results that are presently available; I have reviewed the patients medication list; I have reviewed the consulting providers response and recommendations. I have reviewed or attempted to review medical records based upon their availability    All of the patient's questions have been  addressed and answered. Patient's is agreeable to the above stated plan. I will continue to monitor closely and make adjustments to medical management as needed.  _____________________________________________________________________    Nutrition Status:    Radiology:  Echo  · Concentric remodeling and normal systolic function.  · The estimated ejection fraction is 56%.  · Normal left ventricular diastolic function.  · With normal right ventricular systolic function.  · Mild left atrial enlargement.  · Mild right atrial enlargement.  · Mild tricuspid regurgitation.  · The estimated PA systolic pressure is 38 mmHg.     CTA Chest Non-Coronary (PE Studies)  Narrative: EXAMINATION:  CTA CHEST NON CORONARY (PE STUDIES)    CLINICAL  HISTORY:  Pulmonary embolism (PE) suspected, positive D-dimer;    TECHNIQUE:  CTA imaging of the chest after the administration of 100 mL Isovue-370 IV contrast. Axial, coronal and sagittal reconstructions, including MIP images, are reviewed. Dose length product 442 mGycm. Automatic exposure control, adjustment of mA/kV or iterative reconstruction technique used to limit radiation dose.    COMPARISON:  No relevant comparison studies available at the time of dictation.    FINDINGS:  Diagnostic quality: Adequate    Pulmonary embolism: None identified.    Lung parenchyma: Patchy areas of consolidation in the posterior portion of the right upper lobe and superior portion of the right lower lobe.  Largest area of consolidation measures about 3.5 cm in transverse diameter.  9 mm solid nodule in the superior portion of the left lower lobe (series 7, image 39).  Additional dependent consolidation in the lung bases.    Pleural effusion: Trace bilateral pleural effusions.    Mediastinum/lalo: Heart size upper normal with mild coronary artery and aortic calcification.  Single nonspecific borderline enlarged right paratracheal lymph node measuring 10 mm in short axis.    Chest wall/axilla: No significant findings.    Upper abdomen: No significant findings.    Bones: No acute osseous process.  Impression: 1. No pulmonary embolism identified.  2. Patchy small areas of right lung consolidation with 9 mm solid left lower lobe nodule.  Appearance is suggestive of pneumonia.  2-3 month follow-up chest CT can be considered to confirm resolution.    Electronically signed by: Alec Car  Date:    12/10/2022  Time:    09:41  X-Ray Chest 1 View  Narrative: EXAMINATION:  XR CHEST 1 VIEW    CLINICAL HISTORY:  Chest pain, unspecified    TECHNIQUE:  Frontal view(s) of the chest.    COMPARISON:  No relevant comparison studies available at the time of dictation.    FINDINGS:  Normal cardiac silhouette.  The lungs are well-inflated.  No  consolidation identified.  No significant pleural effusion or discernible pneumothorax.  Impression: No acute pulmonary process identified.    Electronically signed by: Alec Car  Date:    12/10/2022  Time:    09:25      Sergio Johnson MD   12/12/2022

## 2022-12-13 NOTE — PLAN OF CARE
12/13/22 0924   Discharge Assessment   Assessment Type Discharge Planning Assessment   Confirmed/corrected address, phone number and insurance Yes   Confirmed Demographics Correct on Facesheet   Source of Information patient   Communicated BRANT with patient/caregiver Date not available/Unable to determine   People in Home spouse   Do you expect to return to your current living situation? Yes   Do you have help at home or someone to help you manage your care at home? Yes   Current cognitive status: Alert/Oriented   Equipment Currently Used at Home none   Readmission within 30 days? No   Patient currently being followed by outpatient case management? No   Do you currently have service(s) that help you manage your care at home? No   Do you take prescription medications? Yes   Do you have prescription coverage? Yes   Do you have any problems affording any of your prescribed medications? No   Is the patient taking medications as prescribed? yes   How do you get to doctors appointments? family or friend will provide;car, drives self   Are you on dialysis? No   Do you take coumadin? No   Discharge Plan A Home with family   Discharge Plan B Home   DME Needed Upon Discharge  none   Discharge Barriers Identified None

## 2022-12-13 NOTE — DISCHARGE SUMMARY
[facility] Hospital Medicine Discharge Summary    Admit Date: 12/10/2022  Discharge Date and Time: 12/13/20221:49 PM  Admitting Physician:  Team  Discharging Physician: Sergio oJhnson MD.  Primary Care Physician: Marcie Prado MD  Consults: Cardiology    Discharge Diagnoses:  Acute chest pain     NSTEMI type 2 secondary to recent ablation    Community-acquired pneumonia/LLL pna w a solid 9 mm nodule with recs for follow up w ct ches wwo contrast in 2 months    Macrocytic anemia, stable     Elevated D-dimer - cta chest negative for PE    Essential hypertension    Hyperlipidemia    Atrial fibrillation on Xarelto status post ablation 12/08/2022                  Hospital Course:   Serge rPado is a 67 y.o. White male with a past medical history of hypertension, hyperlipidemia, atrial fibrillation on Xarelto status post ablation on 12/08/2022 with Dr. Cary, GERD and back pain. The patient presented to [facility] on 12/10/2022 with a primary complaint of chest pain that began yesterday afternoon (12/09/2022) at 1:00 PM while sitting down.  Patient reports chest pain is located to the right upper chest underneath his collarbone, initially constant, described as dullness without radiation.  Reports associated shortness a breath with exertion and a nonproductive cough and sweats. He currently rates chest pain 5/10 on exam.  He denies complaints of abdominal pain, nausea, vomiting and diarrhea.    Upon presentation to the ED, temperature a 100.4° F, heart rate 84, blood pressure 154/74 and SpO2 98% on room air.  Labs with H&H 12.6/38.4, , BNP 91, troponin 1.335, D-dimer 0.59.  CMP pending.  Influenza A and B and SARS-CoV-2 PCR negative.  UA negative for infection.  EKG sinus rhythm with first-degree AV block.  Chest x-ray without acute pulmonary process.  CTA of the chest without pulmonary embolism but patchy small areas of right lung consolidation with 9 mm solid left lower lobe  nodule suggestive of pneumonia.  While in ED patient received Tylenol, full-dose aspirin, azithromycin and Rocephin.  Cardiology consulted.  Patient is admitted to hospital medicine services for further medical management.    Pt was seen by cis with an impression of nstemi type 2 probably to recent ablation. Pt had a normal pet scan on 9/22. Recs  to treat for pna. Pt has remained in nsr. Pt has improved and voices no complains.   Echo    · Concentric remodeling and normal systolic function.    · The estimated ejection fraction is 56%.    · Normal left ventricular diastolic function.    · With normal right ventricular systolic function.    · Mild left atrial enlargement.    · Mild right atrial enlargement.    · Mild tricuspid regurgitation.    · The estimated PA systolic pressure is 38 mmHg.    Pt's VSS with slight dry cough. Will make arrangement for discharge home on azithromycin 500 mgs po daily to complete a 7 day course. Will also give tessalon perles 100 mgs po tid x 3 days. Pt to follow up with his smith flores as well as cis as scheduled.        Case was discussed with pt at length . Emphasized the need for follow up w smith flores to repeat ct chest in 2 months according to recs by radiology to make sure there has been complete resolution  of LLL pna / solid  9 mm nodule. Will schedule ct chest wwo contrast as outpt    Pt was seen and examined on the day of discharge  Vitals:  VITAL SIGNS: 24 HRS MIN & MAX LAST   Temp  Min: 97.7 °F (36.5 °C)  Max: 98.9 °F (37.2 °C) 98.9 °F (37.2 °C)   BP  Min: 149/71  Max: 178/78 (!) 178/78     Pulse  Min: 56  Max: 67  67   Resp  Min: 18  Max: 18 18   SpO2  Min: 94 %  Max: 96 % 96 %       Physical Exam:  General appearance: Well-developed, well-nourished male in no apparent distress.   at bedside.       HEENT: Atraumatic head. Moist mucous membranes of oral cavity.       Lungs: Clear to auscultation bilaterally.  Rhonchi to left lower lung.       Heart: Regular rate and rhythm.   No edema.       Abdomen: Obese, soft, non-distended,.       Extremities: No cyanosis, clubbing. No deformities.       Skin: No Rash. Warm and dry.       Neuro: Awake, alert and oriented. Motor and sensory exams grossly intact.       Psych/mental status: Appropriate mood and affect. Cooperative. Responds appropriately to questions.    Procedures Performed: No admission procedures for hospital encounter.     Significant Diagnostic Studies: See Full reports for all details    Recent Labs   Lab 12/07/22  0640 12/10/22  0905 12/11/22  0444   WBC 7.0 10.0 9.1   RBC 4.23* 3.73* 3.50*   HGB 14.2 12.6* 11.9*   HCT 42.0 38.4* 34.9*   MCV 99.3* 102.9* 99.7*   MCH 33.6* 33.8* 34.0*   MCHC 33.8 32.8* 34.1   RDW 11.8 12.2 12.0    171 159   MPV 9.9 10.2 10.1       Recent Labs   Lab 12/07/22  0640 12/10/22  0949 12/11/22 0444    134* 133*   K 4.5 3.7 3.6   CO2 21* 25 22*   BUN 15.6 12.8 10.1   CREATININE 0.93 0.85 0.79   CALCIUM 9.6 8.5* 8.4*   ALBUMIN  --  3.8 3.4   ALKPHOS  --  69 68   ALT  --  35 25   AST  --  39* 29   BILITOT  --  1.2 1.2        Microbiology Results (last 7 days)       Procedure Component Value Units Date/Time    Blood culture x two cultures. Draw prior to antibiotics. [[id number]]  (Normal) Collected: 12/10/22 1036    Order Status: Completed Specimen: Blood Updated: 12/13/22 1200     CULTURE, BLOOD ([facility]) No Growth At 72 Hours    Blood culture x two cultures. Draw prior to antibiotics. [[id number]]  (Normal) Collected: 12/10/22 1036    Order Status: Completed Specimen: Blood Updated: 12/13/22 1200     CULTURE, BLOOD ([facility]) No Growth At 72 Hours             Echo  · Concentric remodeling and normal systolic function.  · The estimated ejection fraction is 56%.  · Normal left ventricular diastolic function.  · With normal right ventricular systolic function.  · Mild left atrial enlargement.  · Mild right atrial enlargement.  · Mild tricuspid regurgitation.  · The estimated PA systolic pressure is 38  mmHg.     CTA Chest Non-Coronary (PE Studies)  Narrative: EXAMINATION:  CTA CHEST NON CORONARY (PE STUDIES)    CLINICAL HISTORY:  Pulmonary embolism (PE) suspected, positive D-dimer;    TECHNIQUE:  CTA imaging of the chest after the administration of 100 mL Isovue-370 IV contrast. Axial, coronal and sagittal reconstructions, including MIP images, are reviewed. Dose length product 442 mGycm. Automatic exposure control, adjustment of mA/kV or iterative reconstruction technique used to limit radiation dose.    COMPARISON:  No relevant comparison studies available at the time of dictation.    FINDINGS:  Diagnostic quality: Adequate    Pulmonary embolism: None identified.    Lung parenchyma: Patchy areas of consolidation in the posterior portion of the right upper lobe and superior portion of the right lower lobe.  Largest area of consolidation measures about 3.5 cm in transverse diameter.  9 mm solid nodule in the superior portion of the left lower lobe (series 7, image 39).  Additional dependent consolidation in the lung bases.    Pleural effusion: Trace bilateral pleural effusions.    Mediastinum/lalo: Heart size upper normal with mild coronary artery and aortic calcification.  Single nonspecific borderline enlarged right paratracheal lymph node measuring 10 mm in short axis.    Chest wall/axilla: No significant findings.    Upper abdomen: No significant findings.    Bones: No acute osseous process.  Impression: 1. No pulmonary embolism identified.  2. Patchy small areas of right lung consolidation with 9 mm solid left lower lobe nodule.  Appearance is suggestive of pneumonia.  2-3 month follow-up chest CT can be considered to confirm resolution.    Electronically signed by: Alec Car  Date:    12/10/2022  Time:    09:41  X-Ray Chest 1 View  Narrative: EXAMINATION:  XR CHEST 1 VIEW    CLINICAL HISTORY:  Chest pain, unspecified    TECHNIQUE:  Frontal view(s) of the chest.    COMPARISON:  No relevant comparison  studies available at the time of dictation.    FINDINGS:  Normal cardiac silhouette.  The lungs are well-inflated.  No consolidation identified.  No significant pleural effusion or discernible pneumothorax.  Impression: No acute pulmonary process identified.    Electronically signed by: Alecservando Car  Date:    12/10/2022  Time:    09:25         Medication List        ASK your doctor about these medications      amlodipine-olmesartan 10-40 mg per tablet  Commonly known as: ANGI  Take 1 tablet by mouth once daily     CENTRUM SILVER ORAL     cholecalciferol (vitamin D3) 25 mcg (1,000 unit) capsule  Commonly known as: VITAMIN D3     flecainide 50 MG Tab  Commonly known as: TAMBOCOR     hydroCHLOROthiazide 25 MG tablet  Commonly known as: HYDRODIURIL  Take 1 tablet by mouth once daily     metoprolol succinate 25 MG 24 hr tablet  Commonly known as: TOPROL-XL     pantoprazole 40 MG tablet  Commonly known as: PROTONIX  Take 1 tablet (40 mg total) by mouth once daily.     XARELTO 20 mg Tab  Generic drug: rivaroxaban               Explained in detail to the patient about the discharge plan, medications, and follow-up visits. Pt understands and agrees with the treatment plan  Discharge Disposition: home   Discharged Condition: stable  Diet- cardiac  Dietary Orders (From admission, onward)       Start     Ordered    12/11/22 0928  Diet Cardiac  Diet effective now         12/11/22 0929                   Medications Per NE med rec  Activities as tolerated   Follow-up Information       Marcie Prado MD Follow up.    Specialty: Family Medicine  Why: 1-2 week follow up  Contact information:  [address] [facility] [address]  [phone]               Radha Gregory MD Follow up.    Specialty: Cardiology  Contact information:  [address] [facility] [address]  [phone]               Scout Cary MD Follow up.    Specialties: Cardiology, Pathology  Why: keep scheduled appointment on Friday  Contact  information:  [address]  [place] [id number]                           For further questions contact hospitalist office    Discharge time 33 minutes    For worsening symptoms, chest pain, shortness of breath, increased abdominal pain, high grade fever, stroke or stroke like symptoms, immediately go to the nearest Emergency Room or call 911 as soon as possible.      Sergio Ortiz M.D, on 12/13/2022. at 1:49 PM.

## 2022-12-13 NOTE — DISCHARGE INSTRUCTIONS
1- we will schedule ct chest wwo contrast for 2 months and give you a referral to [facility] . Will fax ct wwwo contrast report to dr kathy goode office as well.    2- take medications as indicated    3- follow up with your primary md    4 follow up with cis as scheduled.

## 2022-12-13 NOTE — PROGRESS NOTES
[facility]      Chief Complaint: Inpatient Follow-up for Chest Pain (Patient reports chest pain and SOB since yesterday, ablation done 12/8 Dr Cary)    HPI: Serge Prado is a 67 y.o. White male with a past medical history of hypertension, hyperlipidemia, atrial fibrillation on Xarelto status post ablation on 12/08/2022 with Dr. Cary, GERD and back pain. The patient presented to [facility] on 12/10/2022 with a primary complaint of chest pain that began yesterday afternoon (12/09/2022) at 1:00 PM while sitting down.  Patient reports chest pain is located to the right upper chest underneath his collarbone, initially constant, described as dullness without radiation.  Reports associated shortness a breath with exertion and a nonproductive cough and the sweats. He currently rates chest pain 5/10 on exam.  He denies complaints of abdominal pain, nausea, vomiting and diarrhea.         Upon presentation to the ED, temperature a 100.4° F, heart rate 84, blood pressure 154/74 and SpO2 98% on room air.  Labs with H&H 12.6/38.4, , BNP 91, troponin 1.335, D-dimer 0.59.  CMP pending.  Influenza A and B and SARS-CoV-2 PCR negative.  UA negative for infection.  EKG sinus rhythm with first-degree AV block.  Chest x-ray without acute pulmonary process.  CTA of the chest without pulmonary embolism but patchy small areas of right lung consolidation with 9 mm solid left lower lobe nodule suggestive of pneumonia.  While in ED patient received Tylenol, full-dose aspirin, azithromycin and Rocephin.  Cardiology consulted.  Patient is admitted to hospital medicine services for further medical management.    Interval Hx:     12/11/22 no new issues , feels fine. Still w cough     12/12/22 FEELS FINE / SLIGHT COUGH/ AMBULATING IN ROOM      Objective/physical exam:  General appearance: Well-developed, well-nourished male in no apparent distress.   at bedside.    HEENT: Atraumatic head. Moist mucous  membranes of oral cavity.    Lungs: Clear to auscultation bilaterally.  Rhonchi to left lower lung.    Heart: Regular rate and rhythm.  No edema.    Abdomen: Obese, soft, non-distended,.    Extremities: No cyanosis, clubbing. No deformities.    Skin: No Rash. Warm and dry.    Neuro: Awake, alert and oriented. Motor and sensory exams grossly intact.    Psych/mental status: Appropriate mood and affect. Cooperative. Responds appropriately to questions.    VITAL SIGNS: 24 HRS MIN & MAX LAST   Temp  Min: 97.7 °F (36.5 °C)  Max: 98.6 °F (37 °C) 97.7 °F (36.5 °C)   BP  Min: 147/71  Max: 169/72 (!) 161/73     Pulse  Min: 57  Max: 72  (!) 57     Resp  Min: 15  Max: 20 18   SpO2  Min: 92 %  Max: 96 % (!) 94 %         Recent Labs   Lab 12/07/22  0640 12/10/22  0905 12/11/22  0444   WBC 7.0 10.0 9.1   RBC 4.23* 3.73* 3.50*   HGB 14.2 12.6* 11.9*   HCT 42.0 38.4* 34.9*   MCV 99.3* 102.9* 99.7*   MCH 33.6* 33.8* 34.0*   MCHC 33.8 32.8* 34.1   RDW 11.8 12.2 12.0    171 159   MPV 9.9 10.2 10.1         Recent Labs   Lab 12/07/22  0640 12/10/22  0949 12/11/22  0444    134* 133*   K 4.5 3.7 3.6   CO2 21* 25 22*   BUN 15.6 12.8 10.1   CREATININE 0.93 0.85 0.79   CALCIUM 9.6 8.5* 8.4*   ALBUMIN  --  3.8 3.4   ALKPHOS  --  69 68   ALT  --  35 25   AST  --  39* 29   BILITOT  --  1.2 1.2            Microbiology Results (last 7 days)       Procedure Component Value Units Date/Time    Blood culture x two cultures. Draw prior to antibiotics. [[id number]]  (Normal) Collected: 12/10/22 1036    Order Status: Completed Specimen: Blood Updated: 12/12/22 1200     CULTURE, BLOOD ([facility]) No Growth At 48 Hours    Blood culture x two cultures. Draw prior to antibiotics. [[id number]]  (Normal) Collected: 12/10/22 1036    Order Status: Completed Specimen: Blood Updated: 12/12/22 1200     CULTURE, BLOOD ([facility]) No Growth At 48 Hours             See below for Radiology    Scheduled Med:   amLODIPine  5 mg Oral Daily    azithromycin  500 mg  Intravenous Q24H    cefTRIAXone (ROCEPHIN) IVPB  1 g Intravenous Q24H    flecainide  50 mg Oral BID    ibuprofen  400 mg Oral TID    metoprolol succinate  25 mg Oral BID    pantoprazole  40 mg Oral Daily    rivaroxaban  20 mg Oral Daily with dinner    valsartan  80 mg Oral Daily        Continuous Infusions:       PRN Meds:  acetaminophen, hydrALAZINE, ondansetron       Assessment/Plan:  Acute chest pain   NSTEMI type 2 secondary to recent ablation  Community-acquired pneumonia  Macrocytic anemia, stable   Elevated D-dimer   Essential hypertension  Hyperlipidemia  Atrial fibrillation on Xarelto status post ablation 12/08/2022      Plan - continue present management . WILL DISCHARGE IN AM    VTE prophylaxis:     Patient condition:  Stable    Anticipated discharge and Disposition:   HOME      All diagnosis and differential diagnosis have been reviewed; assessment and plan has been documented; I have personally reviewed the labs and test results that are presently available; I have reviewed the patients medication list; I have reviewed the consulting providers response and recommendations. I have reviewed or attempted to review medical records based upon their availability    All of the patient's questions have been  addressed and answered. Patient's is agreeable to the above stated plan. I will continue to monitor closely and make adjustments to medical management as needed.  _____________________________________________________________________    Nutrition Status:    Radiology:  Echo  · Concentric remodeling and normal systolic function.  · The estimated ejection fraction is 56%.  · Normal left ventricular diastolic function.  · With normal right ventricular systolic function.  · Mild left atrial enlargement.  · Mild right atrial enlargement.  · Mild tricuspid regurgitation.  · The estimated PA systolic pressure is 38 mmHg.     CTA Chest Non-Coronary (PE Studies)  Narrative: EXAMINATION:  CTA CHEST NON CORONARY (PE  STUDIES)    CLINICAL HISTORY:  Pulmonary embolism (PE) suspected, positive D-dimer;    TECHNIQUE:  CTA imaging of the chest after the administration of 100 mL Isovue-370 IV contrast. Axial, coronal and sagittal reconstructions, including MIP images, are reviewed. Dose length product 442 mGycm. Automatic exposure control, adjustment of mA/kV or iterative reconstruction technique used to limit radiation dose.    COMPARISON:  No relevant comparison studies available at the time of dictation.    FINDINGS:  Diagnostic quality: Adequate    Pulmonary embolism: None identified.    Lung parenchyma: Patchy areas of consolidation in the posterior portion of the right upper lobe and superior portion of the right lower lobe.  Largest area of consolidation measures about 3.5 cm in transverse diameter.  9 mm solid nodule in the superior portion of the left lower lobe (series 7, image 39).  Additional dependent consolidation in the lung bases.    Pleural effusion: Trace bilateral pleural effusions.    Mediastinum/lalo: Heart size upper normal with mild coronary artery and aortic calcification.  Single nonspecific borderline enlarged right paratracheal lymph node measuring 10 mm in short axis.    Chest wall/axilla: No significant findings.    Upper abdomen: No significant findings.    Bones: No acute osseous process.  Impression: 1. No pulmonary embolism identified.  2. Patchy small areas of right lung consolidation with 9 mm solid left lower lobe nodule.  Appearance is suggestive of pneumonia.  2-3 month follow-up chest CT can be considered to confirm resolution.    Electronically signed by: Alec Car  Date:    12/10/2022  Time:    09:41  X-Ray Chest 1 View  Narrative: EXAMINATION:  XR CHEST 1 VIEW    CLINICAL HISTORY:  Chest pain, unspecified    TECHNIQUE:  Frontal view(s) of the chest.    COMPARISON:  No relevant comparison studies available at the time of dictation.    FINDINGS:  Normal cardiac silhouette.  The lungs are  well-inflated.  No consolidation identified.  No significant pleural effusion or discernible pneumothorax.  Impression: No acute pulmonary process identified.    Electronically signed by: Alec Car  Date:    12/10/2022  Time:    09:25      Sergio Johnson MD   12/12/2022

## 2022-12-14 PROBLEM — Z98.890 HISTORY OF RADIOFREQUENCY ABLATION (RFA) PROCEDURE FOR CARDIAC ARRHYTHMIA: Status: ACTIVE | Noted: 2022-12-14

## 2022-12-14 PROBLEM — Z00.00 WELLNESS EXAMINATION: Status: ACTIVE | Noted: 2022-12-14

## 2022-12-15 LAB
BACTERIA BLD CULT: NORMAL
BACTERIA BLD CULT: NORMAL

## 2022-12-28 ENCOUNTER — PATIENT OUTREACH (OUTPATIENT)
Dept: ADMINISTRATIVE | Facility: HOSPITAL | Age: 67
End: 2022-12-28
Payer: COMMERCIAL

## 2022-12-28 NOTE — PROGRESS NOTES
Population Health Outreach.Records Received, hyper-linked into chart at this time. The following record(s)  below were uploaded for Health Maintenance .    7/31/2018-colonoscopy

## 2023-03-03 ENCOUNTER — LAB VISIT (OUTPATIENT)
Dept: LAB | Facility: HOSPITAL | Age: 68
End: 2023-03-03
Attending: FAMILY MEDICINE
Payer: COMMERCIAL

## 2023-03-03 DIAGNOSIS — Z00.00 WELLNESS EXAMINATION: ICD-10-CM

## 2023-03-03 LAB
ALBUMIN SERPL-MCNC: 4 G/DL (ref 3.4–4.8)
ALBUMIN/GLOB SERPL: 1.1 RATIO (ref 1.1–2)
ALP SERPL-CCNC: 95 UNIT/L (ref 40–150)
ALT SERPL-CCNC: 84 UNIT/L (ref 0–55)
APPEARANCE UR: CLEAR
AST SERPL-CCNC: 74 UNIT/L (ref 5–34)
BACTERIA #/AREA URNS AUTO: ABNORMAL /HPF
BASOPHILS # BLD AUTO: 0.02 X10(3)/MCL (ref 0–0.2)
BASOPHILS NFR BLD AUTO: 0.3 %
BILIRUB UR QL STRIP.AUTO: NEGATIVE MG/DL
BILIRUBIN DIRECT+TOT PNL SERPL-MCNC: 0.6 MG/DL
BUN SERPL-MCNC: 13.2 MG/DL (ref 8.4–25.7)
CALCIUM SERPL-MCNC: 9.7 MG/DL (ref 8.8–10)
CHLORIDE SERPL-SCNC: 103 MMOL/L (ref 98–107)
CHOLEST SERPL-MCNC: 168 MG/DL
CHOLEST/HDLC SERPL: 4 {RATIO} (ref 0–5)
CO2 SERPL-SCNC: 25 MMOL/L (ref 23–31)
COLOR UR AUTO: ABNORMAL
CREAT SERPL-MCNC: 0.95 MG/DL (ref 0.73–1.18)
DEPRECATED CALCIDIOL+CALCIFEROL SERPL-MC: 36.1 NG/ML (ref 30–80)
EOSINOPHIL # BLD AUTO: 0.23 X10(3)/MCL (ref 0–0.9)
EOSINOPHIL NFR BLD AUTO: 3.4 %
ERYTHROCYTE [DISTWIDTH] IN BLOOD BY AUTOMATED COUNT: 11.7 % (ref 11.5–17)
EST. AVERAGE GLUCOSE BLD GHB EST-MCNC: 99.7 MG/DL
GFR SERPLBLD CREATININE-BSD FMLA CKD-EPI: >60 MLS/MIN/1.73/M2
GLOBULIN SER-MCNC: 3.8 GM/DL (ref 2.4–3.5)
GLUCOSE SERPL-MCNC: 113 MG/DL (ref 82–115)
GLUCOSE UR QL STRIP.AUTO: NEGATIVE MG/DL
HBA1C MFR BLD: 5.1 %
HCT VFR BLD AUTO: 39.7 % (ref 42–52)
HDLC SERPL-MCNC: 40 MG/DL (ref 35–60)
HGB BLD-MCNC: 13.3 G/DL (ref 14–18)
IMM GRANULOCYTES # BLD AUTO: 0.04 X10(3)/MCL (ref 0–0.04)
IMM GRANULOCYTES NFR BLD AUTO: 0.6 %
KETONES UR QL STRIP.AUTO: NEGATIVE MG/DL
LDLC SERPL CALC-MCNC: 94 MG/DL (ref 50–140)
LEUKOCYTE ESTERASE UR QL STRIP.AUTO: NEGATIVE UNIT/L
LYMPHOCYTES # BLD AUTO: 2.81 X10(3)/MCL (ref 0.6–4.6)
LYMPHOCYTES NFR BLD AUTO: 42.1 %
MCH RBC QN AUTO: 32.4 PG
MCHC RBC AUTO-ENTMCNC: 33.5 G/DL (ref 33–36)
MCV RBC AUTO: 96.6 FL (ref 80–94)
MONOCYTES # BLD AUTO: 0.5 X10(3)/MCL (ref 0.1–1.3)
MONOCYTES NFR BLD AUTO: 7.5 %
NEUTROPHILS # BLD AUTO: 3.07 X10(3)/MCL (ref 2.1–9.2)
NEUTROPHILS NFR BLD AUTO: 46.1 %
NITRITE UR QL STRIP.AUTO: NEGATIVE
PH UR STRIP.AUTO: 5 [PH]
PLATELET # BLD AUTO: 274 X10(3)/MCL (ref 130–400)
PMV BLD AUTO: 9.5 FL (ref 7.4–10.4)
POTASSIUM SERPL-SCNC: 4.9 MMOL/L (ref 3.5–5.1)
PROT SERPL-MCNC: 7.8 GM/DL (ref 5.8–7.6)
PROT UR QL STRIP.AUTO: 30 MG/DL
PSA SERPL-MCNC: 0.9 NG/ML
RBC # BLD AUTO: 4.11 X10(6)/MCL (ref 4.7–6.1)
RBC #/AREA URNS AUTO: ABNORMAL /HPF
RBC UR QL AUTO: ABNORMAL UNIT/L
SODIUM SERPL-SCNC: 138 MMOL/L (ref 136–145)
SP GR UR STRIP.AUTO: 1.02
SPERM URNS QL MICRO: ABNORMAL /HPF
SQUAMOUS #/AREA URNS AUTO: ABNORMAL /HPF
TRIGL SERPL-MCNC: 169 MG/DL (ref 34–140)
TSH SERPL-ACNC: 2.14 UIU/ML (ref 0.35–4.94)
UROBILINOGEN UR STRIP-ACNC: 0.2 MG/DL
VIT B12 SERPL-MCNC: 664 PG/ML (ref 213–816)
VLDLC SERPL CALC-MCNC: 34 MG/DL
WBC # SPEC AUTO: 6.7 X10(3)/MCL (ref 4.5–11.5)
WBC #/AREA URNS AUTO: ABNORMAL /HPF

## 2023-03-03 PROCEDURE — 81001 URINALYSIS AUTO W/SCOPE: CPT

## 2023-03-03 PROCEDURE — 85025 COMPLETE CBC W/AUTO DIFF WBC: CPT

## 2023-03-03 PROCEDURE — 82607 VITAMIN B-12: CPT

## 2023-03-03 PROCEDURE — 82306 VITAMIN D 25 HYDROXY: CPT

## 2023-03-03 PROCEDURE — 83036 HEMOGLOBIN GLYCOSYLATED A1C: CPT

## 2023-03-03 PROCEDURE — 36415 COLL VENOUS BLD VENIPUNCTURE: CPT

## 2023-03-03 PROCEDURE — 80061 LIPID PANEL: CPT

## 2023-03-03 PROCEDURE — 84443 ASSAY THYROID STIM HORMONE: CPT

## 2023-03-03 PROCEDURE — 84153 ASSAY OF PSA TOTAL: CPT

## 2023-03-03 PROCEDURE — 80053 COMPREHEN METABOLIC PANEL: CPT

## 2023-03-15 ENCOUNTER — HOSPITAL ENCOUNTER (OUTPATIENT)
Dept: RADIOLOGY | Facility: HOSPITAL | Age: 68
Discharge: HOME | End: 2023-03-15
Attending: INTERNAL MEDICINE
Payer: COMMERCIAL

## 2023-03-15 DIAGNOSIS — J18.9 COMMUNITY ACQUIRED PNEUMONIA, UNSPECIFIED LATERALITY: ICD-10-CM

## 2023-03-15 PROCEDURE — 71250 CT THORAX DX C-: CPT | Mod: TC

## 2023-03-20 PROBLEM — J18.9 CAP (COMMUNITY ACQUIRED PNEUMONIA): Status: RESOLVED | Noted: 2022-12-13 | Resolved: 2023-03-20

## 2023-03-20 PROBLEM — Z00.00 WELLNESS EXAMINATION: Status: RESOLVED | Noted: 2022-12-14 | Resolved: 2023-03-20

## 2023-05-03 ENCOUNTER — OFFICE VISIT (OUTPATIENT)
Dept: ORTHOPEDICS | Facility: CLINIC | Age: 68
End: 2023-05-03
Payer: COMMERCIAL

## 2023-05-03 VITALS
HEIGHT: 69 IN | DIASTOLIC BLOOD PRESSURE: 75 MMHG | TEMPERATURE: 98 F | BODY MASS INDEX: 38.36 KG/M2 | HEART RATE: 74 BPM | WEIGHT: 259 LBS | SYSTOLIC BLOOD PRESSURE: 140 MMHG

## 2023-05-03 DIAGNOSIS — M25.561 RIGHT KNEE PAIN, UNSPECIFIED CHRONICITY: Primary | ICD-10-CM

## 2023-05-03 DIAGNOSIS — M17.11 LOCALIZED OSTEOARTHRITIS OF RIGHT KNEE: ICD-10-CM

## 2023-05-03 DIAGNOSIS — M25.461 EFFUSION OF RIGHT KNEE JOINT: ICD-10-CM

## 2023-05-03 PROCEDURE — 3008F PR BODY MASS INDEX (BMI) DOCUMENTED: ICD-10-PCS | Mod: CPTII,,, | Performed by: ORTHOPAEDIC SURGERY

## 2023-05-03 PROCEDURE — 3078F PR MOST RECENT DIASTOLIC BLOOD PRESSURE < 80 MM HG: ICD-10-PCS | Mod: CPTII,,, | Performed by: ORTHOPAEDIC SURGERY

## 2023-05-03 PROCEDURE — 4010F PR ACE/ARB THEARPY RXD/TAKEN: ICD-10-PCS | Mod: CPTII,,, | Performed by: ORTHOPAEDIC SURGERY

## 2023-05-03 PROCEDURE — 3077F SYST BP >= 140 MM HG: CPT | Mod: CPTII,,, | Performed by: ORTHOPAEDIC SURGERY

## 2023-05-03 PROCEDURE — 99214 OFFICE O/P EST MOD 30 MIN: CPT | Mod: 25,,, | Performed by: ORTHOPAEDIC SURGERY

## 2023-05-03 PROCEDURE — 4010F ACE/ARB THERAPY RXD/TAKEN: CPT | Mod: CPTII,,, | Performed by: ORTHOPAEDIC SURGERY

## 2023-05-03 PROCEDURE — 20610 DRAIN/INJ JOINT/BURSA W/O US: CPT | Mod: RT,,, | Performed by: ORTHOPAEDIC SURGERY

## 2023-05-03 PROCEDURE — 1159F MED LIST DOCD IN RCRD: CPT | Mod: CPTII,,, | Performed by: ORTHOPAEDIC SURGERY

## 2023-05-03 PROCEDURE — 1160F RVW MEDS BY RX/DR IN RCRD: CPT | Mod: CPTII,,, | Performed by: ORTHOPAEDIC SURGERY

## 2023-05-03 PROCEDURE — 3078F DIAST BP <80 MM HG: CPT | Mod: CPTII,,, | Performed by: ORTHOPAEDIC SURGERY

## 2023-05-03 PROCEDURE — 1125F PR PAIN SEVERITY QUANTIFIED, PAIN PRESENT: ICD-10-PCS | Mod: CPTII,,, | Performed by: ORTHOPAEDIC SURGERY

## 2023-05-03 PROCEDURE — 1159F PR MEDICATION LIST DOCUMENTED IN MEDICAL RECORD: ICD-10-PCS | Mod: CPTII,,, | Performed by: ORTHOPAEDIC SURGERY

## 2023-05-03 PROCEDURE — 99214 PR OFFICE/OUTPT VISIT, EST, LEVL IV, 30-39 MIN: ICD-10-PCS | Mod: 25,,, | Performed by: ORTHOPAEDIC SURGERY

## 2023-05-03 PROCEDURE — 1160F PR REVIEW ALL MEDS BY PRESCRIBER/CLIN PHARMACIST DOCUMENTED: ICD-10-PCS | Mod: CPTII,,, | Performed by: ORTHOPAEDIC SURGERY

## 2023-05-03 PROCEDURE — 1101F PR PT FALLS ASSESS DOC 0-1 FALLS W/OUT INJ PAST YR: ICD-10-PCS | Mod: CPTII,,, | Performed by: ORTHOPAEDIC SURGERY

## 2023-05-03 PROCEDURE — 1101F PT FALLS ASSESS-DOCD LE1/YR: CPT | Mod: CPTII,,, | Performed by: ORTHOPAEDIC SURGERY

## 2023-05-03 PROCEDURE — 20610 LARGE JOINT ASPIRATION/INJECTION: R KNEE: ICD-10-PCS | Mod: RT,,, | Performed by: ORTHOPAEDIC SURGERY

## 2023-05-03 PROCEDURE — 1125F AMNT PAIN NOTED PAIN PRSNT: CPT | Mod: CPTII,,, | Performed by: ORTHOPAEDIC SURGERY

## 2023-05-03 PROCEDURE — 3288F FALL RISK ASSESSMENT DOCD: CPT | Mod: CPTII,,, | Performed by: ORTHOPAEDIC SURGERY

## 2023-05-03 PROCEDURE — 3288F PR FALLS RISK ASSESSMENT DOCUMENTED: ICD-10-PCS | Mod: CPTII,,, | Performed by: ORTHOPAEDIC SURGERY

## 2023-05-03 PROCEDURE — 3077F PR MOST RECENT SYSTOLIC BLOOD PRESSURE >= 140 MM HG: ICD-10-PCS | Mod: CPTII,,, | Performed by: ORTHOPAEDIC SURGERY

## 2023-05-03 PROCEDURE — 3008F BODY MASS INDEX DOCD: CPT | Mod: CPTII,,, | Performed by: ORTHOPAEDIC SURGERY

## 2023-05-03 RX ORDER — BETAMETHASONE SODIUM PHOSPHATE AND BETAMETHASONE ACETATE 3; 3 MG/ML; MG/ML
12 INJECTION, SUSPENSION INTRA-ARTICULAR; INTRALESIONAL; INTRAMUSCULAR; SOFT TISSUE
Status: DISCONTINUED | OUTPATIENT
Start: 2023-05-03 | End: 2023-05-03 | Stop reason: HOSPADM

## 2023-05-03 RX ORDER — LIDOCAINE HYDROCHLORIDE 20 MG/ML
3 INJECTION, SOLUTION INFILTRATION; PERINEURAL
Status: DISCONTINUED | OUTPATIENT
Start: 2023-05-03 | End: 2023-05-03 | Stop reason: HOSPADM

## 2023-05-03 RX ADMIN — BETAMETHASONE SODIUM PHOSPHATE AND BETAMETHASONE ACETATE 12 MG: 3; 3 INJECTION, SUSPENSION INTRA-ARTICULAR; INTRALESIONAL; INTRAMUSCULAR; SOFT TISSUE at 08:05

## 2023-05-03 RX ADMIN — LIDOCAINE HYDROCHLORIDE 3 MG: 20 INJECTION, SOLUTION INFILTRATION; PERINEURAL at 08:05

## 2023-05-03 NOTE — PROCEDURES
Large Joint Aspiration/Injection: R knee    Date/Time: 5/3/2023 8:30 AM  Performed by: Louis Marroquin MD  Authorized by: Louis Marroquin MD     Consent Done?:  Yes (Verbal)  Indications:  Pain  Site marked: the procedure site was marked    Prep: patient was prepped and draped in usual sterile fashion    Approach:  Anterolateral  Location:  Knee  Site:  R knee  Medications:  12 mg betamethasone acetate-betamethasone sodium phosphate 6 mg/mL; 3 mg LIDOcaine HCL 20 mg/ml (2%) 20 mg/mL (2 %)  Patient tolerance:  Patient tolerated the procedure well with no immediate complications

## 2023-05-03 NOTE — PROGRESS NOTES
"Subjective:    CC: Pain of the Right Knee (Right knee pain for the last 2 weeks, bent down and had as pop in the knee. Taking OTC which helps little, icing and rest. )       HPI:  Patient comes in today complaining of right knee pain.  Patient states he was bending down when he felt a pop in his right knee.  Since then he has been having some swelling, he is tried some rest and anti-inflammatories without much relief.    ROS: Refer to HPI for pertinent ROS. All other 12 point systems negative.    Objective:  Vitals:    05/03/23 0832   BP: (!) 140/75   BP Location: Right arm   Patient Position: Sitting   BP Method: Medium (Automatic)   Pulse: 74   Temp: 97.9 °F (36.6 °C)   Weight: 117.5 kg (259 lb)   Height: 5' 9" (1.753 m)        Physical Exam:  The patient is well-nourished, well-developed and in no apparent distress, pleasant and cooperative. Examination of the right lower extremity compartments are soft and warm. Skin is intact. There are no signs or symptoms of DVT or infection. There is a moderate joint effusion. There is no erythema. Tender to palpation along the lateral joint line , right knee range of motion is 0-100. The knee is stable to exam with varus and valgus stressing. Negative anterior and posterior drawer. Negative Lachman´s.  Questionable Hever's test. Patella grind is positive, Negative for apprehension. Neurovascularly intact distally.    Images:  Questionable x-rays three views of the right knee demonstrates degenerative changes. Images Reviewed and discussed with patient.    Assessment:  1. Right knee pain, unspecified chronicity  - X-Ray Knee 3 View Right; Future    2. Localized osteoarthritis of right knee  - Large Joint Aspiration/Injection: R knee    3. Effusion of right knee joint  - Large Joint Aspiration/Injection: R knee        Plan:  At this time we discussed his physical exam and x-ray findings.  Under sterile technique proximally 50cc serous fluid aspirated.  He also received a " steroid lidocaine injection to the right knee.  He will continue low-impact activities, like see back in 4 weeks to see how he is progressing, if he does not improve we have discussed an MRI.    Follow UP: No follow-ups on file.    Large Joint Aspiration/Injection: R knee    Date/Time: 5/3/2023 8:30 AM  Performed by: Louis Marroquin MD  Authorized by: Louis Marroquin MD     Consent Done?:  Yes (Verbal)  Indications:  Pain  Site marked: the procedure site was marked    Prep: patient was prepped and draped in usual sterile fashion    Approach:  Anterolateral  Location:  Knee  Site:  R knee  Medications:  12 mg betamethasone acetate-betamethasone sodium phosphate 6 mg/mL; 3 mg LIDOcaine HCL 20 mg/ml (2%) 20 mg/mL (2 %)  Patient tolerance:  Patient tolerated the procedure well with no immediate complications

## 2023-05-19 ENCOUNTER — HOSPITAL ENCOUNTER (EMERGENCY)
Facility: HOSPITAL | Age: 68
Discharge: HOME | End: 2023-05-19
Attending: STUDENT IN AN ORGANIZED HEALTH CARE EDUCATION/TRAINING PROGRAM
Payer: OTHER MISCELLANEOUS

## 2023-05-19 VITALS
HEIGHT: 69 IN | TEMPERATURE: 98 F | RESPIRATION RATE: 20 BRPM | OXYGEN SATURATION: 100 % | BODY MASS INDEX: 37.77 KG/M2 | SYSTOLIC BLOOD PRESSURE: 152 MMHG | WEIGHT: 255 LBS | HEART RATE: 62 BPM | DIASTOLIC BLOOD PRESSURE: 66 MMHG

## 2023-05-19 DIAGNOSIS — S61.412A LACERATION OF LEFT HAND WITHOUT FOREIGN BODY, INITIAL ENCOUNTER: ICD-10-CM

## 2023-05-19 DIAGNOSIS — S01.81XA FOREHEAD LACERATION, INITIAL ENCOUNTER: Primary | ICD-10-CM

## 2023-05-19 DIAGNOSIS — M25.512 ACUTE PAIN OF LEFT SHOULDER: ICD-10-CM
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
## 2023-05-19 DIAGNOSIS — R52 PAIN: ICD-10-CM

## 2023-05-19 DIAGNOSIS — S80.212A ABRASION, LEFT KNEE, INITIAL ENCOUNTER: ICD-10-CM

## 2023-05-19 PROCEDURE — 63600175 PHARM REV CODE 636 W HCPCS: Performed by: STUDENT IN AN ORGANIZED HEALTH CARE EDUCATION/TRAINING PROGRAM

## 2023-05-19 PROCEDURE — 99285 EMERGENCY DEPT VISIT HI MDM: CPT | Mod: 25

## 2023-05-19 PROCEDURE — 90715 TDAP VACCINE 7 YRS/> IM: CPT | Performed by: STUDENT IN AN ORGANIZED HEALTH CARE EDUCATION/TRAINING PROGRAM

## 2023-05-19 PROCEDURE — 90471 IMMUNIZATION ADMIN: CPT | Performed by: STUDENT IN AN ORGANIZED HEALTH CARE EDUCATION/TRAINING PROGRAM

## 2023-05-19 RX ORDER — METHOCARBAMOL 750 MG/1
1500 TABLET, FILM COATED ORAL 3 TIMES DAILY
Qty: 30 TABLET | Refills: 0 | Status: SHIPPED | OUTPATIENT
Start: 2023-05-19 | End: 2023-05-24

## 2023-05-19 RX ADMIN — TETANUS TOXOID, REDUCED DIPHTHERIA TOXOID AND ACELLULAR PERTUSSIS VACCINE, ADSORBED 0.5 ML: 5; 2.5; 8; 8; 2.5 SUSPENSION INTRAMUSCULAR at 07:05
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
## 2023-05-19 NOTE — ED NOTES
Left eyebrow laceration, left knee abrasion, and left hand skin partial avulsion cleaned with wound cleanser.

## 2023-05-19 NOTE — Clinical Note
"Serge "Zen Prado was seen and treated in our emergency department on 5/19/2023.  He may return to work on 05/23/2023.       If you have any questions or concerns, please don't hesitate to call.      Valeria Stone MD"

## 2023-06-12 ENCOUNTER — OFFICE VISIT (OUTPATIENT)
Dept: ORTHOPEDICS | Facility: CLINIC | Age: 68
End: 2023-06-12
Payer: OTHER MISCELLANEOUS

## 2023-06-12 VITALS — HEIGHT: 69 IN | WEIGHT: 263 LBS | BODY MASS INDEX: 38.95 KG/M2

## 2023-06-12 DIAGNOSIS — M75.22 BICIPITAL TENDINITIS OF LEFT SHOULDER: ICD-10-CM

## 2023-06-12 DIAGNOSIS — M75.42 SHOULDER IMPINGEMENT SYNDROME, LEFT: ICD-10-CM

## 2023-06-12 DIAGNOSIS — M75.122 COMPLETE TEAR OF LEFT ROTATOR CUFF, UNSPECIFIED WHETHER TRAUMATIC: Primary | ICD-10-CM

## 2023-06-12 PROCEDURE — 99214 PR OFFICE/OUTPT VISIT, EST, LEVL IV, 30-39 MIN: ICD-10-PCS | Mod: ,,, | Performed by: ORTHOPAEDIC SURGERY

## 2023-06-12 PROCEDURE — 99214 OFFICE O/P EST MOD 30 MIN: CPT | Mod: ,,, | Performed by: ORTHOPAEDIC SURGERY

## 2023-06-12 NOTE — PROGRESS NOTES
"Subjective:    CC: Pain of the Left Shoulder and Injury (WC - L shoulder pain - pt states that he tripped over a doorway landing on his left side - pt fell on 5/19/23 - xr done 5/19/23 at [facility] - td)       HPI:  Patient comes in today complaining of left shoulder pain.  Patient states he tripped over a door way, landing on his left side about a month ago.  Had to go to the ER due to the continued pain and loss of motion.  He still has difficulty with any motion of his shoulder.  He is tried some shoulder exercises his PCP gave him, though he still has difficulty raising it.  He is tried rest medication as well without relief.  He is approximately 1 month now from his injury.  He states it feels like his right shoulder which required rotator cuff repair.    ROS: Refer to HPI for pertinent ROS. All other 12 point systems negative.    Objective:  Vitals:    06/12/23 1455   Weight: 119.3 kg (263 lb)   Height: 5' 9" (1.753 m)        Physical Exam:  Patient is well-nourished and well-developed, in no apparent distress, pleasant and cooperative. Examination of the left upper extremity compartments are soft and warm.  Skin is intact. There are no signs or symptoms of DVT or infection.   Patient is tender to palpation along the  anterolateral aspect .  Patient is able to forward flex and abduct to 75.  Positive Victor and Neers, positive empty can, suspected positive drop arm test. Negative sulcus sign. Stable to stressing. Neurovascularly intact distally.    Images:  Outside x-rays were reviewed. Images Reviewed and discussed with patient.    Assessment:  1. Complete tear of left rotator cuff, unspecified whether traumatic  - MRI Shoulder Without Contrast Left    2. Shoulder impingement syndrome, left  - MRI Shoulder Without Contrast Left    3. Bicipital tendinitis of left shoulder  - MRI Shoulder Without Contrast Left        Plan:  At this time we discussed his physica exam his previous x-ray findings.  I am concerned for " complete rotator cuff tear.  He remains be symptomatic, has a positive drop-arm test.  Proceed with a MRI of his left shoulder.  I would like see him back later this week for his results.  He will continue to hold off on his job duties at this time.    Follow UP: No follow-ups on file.

## 2023-06-13 DIAGNOSIS — F40.240 CLAUSTROPHOBIA: Primary | ICD-10-CM

## 2023-06-13 RX ORDER — DIAZEPAM 5 MG/1
TABLET ORAL
Qty: 2 TABLET | Refills: 0 | Status: SHIPPED | OUTPATIENT
Start: 2023-06-13 | End: 2023-06-26

## 2023-06-15 ENCOUNTER — HOSPITAL ENCOUNTER (OUTPATIENT)
Dept: RADIOLOGY | Facility: HOSPITAL | Age: 68
Discharge: HOME | End: 2023-06-15
Attending: ORTHOPAEDIC SURGERY
Payer: OTHER MISCELLANEOUS

## 2023-06-15 PROCEDURE — 73221 MRI JOINT UPR EXTREM W/O DYE: CPT | Mod: TC,LT

## 2023-06-19 ENCOUNTER — OFFICE VISIT (OUTPATIENT)
Dept: ORTHOPEDICS | Facility: CLINIC | Age: 68
End: 2023-06-19
Payer: OTHER MISCELLANEOUS

## 2023-06-19 VITALS — WEIGHT: 263 LBS | BODY MASS INDEX: 38.95 KG/M2 | HEIGHT: 69 IN
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
## 2023-06-19 DIAGNOSIS — M75.122 COMPLETE TEAR OF LEFT ROTATOR CUFF, UNSPECIFIED WHETHER TRAUMATIC: ICD-10-CM

## 2023-06-19 DIAGNOSIS — M75.22 BICIPITAL TENDINITIS OF LEFT SHOULDER: ICD-10-CM

## 2023-06-19 DIAGNOSIS — M75.42 SHOULDER IMPINGEMENT SYNDROME, LEFT: Primary | ICD-10-CM

## 2023-06-19 DIAGNOSIS — M25.512 ACUTE PAIN OF LEFT SHOULDER: ICD-10-CM

## 2023-06-19 DIAGNOSIS — Z01.818 PREOP TESTING: ICD-10-CM

## 2023-06-19 PROCEDURE — 99214 PR OFFICE/OUTPT VISIT, EST, LEVL IV, 30-39 MIN: ICD-10-PCS | Mod: ,,, | Performed by: ORTHOPAEDIC SURGERY

## 2023-06-19 PROCEDURE — 99214 OFFICE O/P EST MOD 30 MIN: CPT | Mod: ,,, | Performed by: ORTHOPAEDIC SURGERY

## 2023-06-19 RX ORDER — SODIUM CHLORIDE, SODIUM GLUCONATE, SODIUM ACETATE, POTASSIUM CHLORIDE AND MAGNESIUM CHLORIDE 30; 37; 368; 526; 502 MG/100ML; MG/100ML; MG/100ML; MG/100ML; MG/100ML
INJECTION, SOLUTION INTRAVENOUS CONTINUOUS
Status: CANCELLED | OUTPATIENT
Start: 2023-06-19

## 2023-06-19 NOTE — PROGRESS NOTES
"Subjective:    CC: Injections and Results of the Left Shoulder and Results (MRI results L shoulder - pt states that his shoulder is hurting still. he states that he is sleeping in a recliner due to not being able to sleep in a bed)       HPI:  Patient comes in today for repeat exam.  Patient continues to have pain and loss of motion of his left shoulder.  He is not improve with conservative treatment.  He did have an MRI, demonstrating a full-thickness retracted rotator cuff tear    ROS: Refer to HPI for pertinent ROS. All other 12 point systems negative.    Objective:  Vitals:    06/19/23 1455   Weight: 119.3 kg (263 lb)   Height: 5' 9" (1.753 m)        Physical Exam:  Patient is well-nourished and well-developed, in no apparent distress, pleasant and cooperative. Examination of the left upper extremity compartments are soft and warm.  Skin is intact. There are no signs or symptoms of DVT or infection.   Patient is tender to palpation along the  anterolateral aspect .  Patient is able to forward flex and abduct to 85°.  Positive Victor and Neers, positive empty can, positive drop arm test. Negative sulcus sign. Stable to stressing. Neurovascularly intact distally.    Images:  MRI reviewed. Images Reviewed and discussed with patient.    Assessment:  1. Fall, sequela  - Ambulatory referral/consult to Orthopedics    2. Acute pain of left shoulder  - Ambulatory referral/consult to Orthopedics    3. Shoulder impingement syndrome, left    4. Complete tear of left rotator cuff, unspecified whether traumatic    5. Bicipital tendinitis of left shoulder        Plan:  At this time we discussed his physical exam and MRI findings.  We have discussed various treatment options including conservative treatments well surgical intervention.  The risks benefits and alternatives were discussed with the patient in detail.  All questions were answered.  We have discussed the down time rehab process afterwards.  We will plan for " surgical intervention, we will set this up at his convenience.    Follow UP: No follow-ups on file.    Addendum:  Spoke with the Dr. Shelby on 6/26/23, clarified that patient did participate in home exercise program but was unable to notice any improvement due to pain and weakness.  We will submit MRI findings.

## 2023-06-19 NOTE — LETTER
Cardiac Risk Assessment Request Form      Date: 6/19/23      Patient's Name: Serge Prado     Date of Birth: [birth year]    Patient is scheduled to have left shoulder ATS, bicep tenotomy, RTC repair, and SAD  on 6/28/23 by Dr. Marroquin with (check one):    xxx General      Local/regional      MAC     Conscious Sedation Anesthesia      Dx: left shoulder RTC tear, bicep tendon tear, impingement (please no ICD-10 code)      Requesting staff name: Mary Limon LPN      Phone number: [phone]        Fax number: [phone]      Check all that apply and complete blanks:    xx  Request for cardiac risk assessment for procedure    xx Request to hold Xarelto for 5-7days prior to procedure     Pre-procedure antibiotics: Cardiac status information     Major dental procedure      Additional records requested: ___________________________________________       ** Please fax document back to [phone] or [phone]   ATTN: [facility] ([facility]) and allow at lease 3 business days to receive a response from [facility].   According to American College of [employer]iology cardiac risk assessment, low risk surgeries do not need cardiac testing or risk stratification. Patients that are asymptomatic should safely proceed with low risk procedures that may include, but are not limited to dental procedure, minor skin procedures, EGD, Colonoscopy or Cataracts.   Symptomatic patients should make an appointment with [facility].

## 2023-06-19 NOTE — LETTER
[facility]  [address] [place] [address] [address], [address]  Phone: [phone]  Fax: [phone]    Name:Serge Prado  :1955   Date:2023     PATIENT IS UNABLE TO WORK AS OF: 23  [_] Pending treatment.  [_] For approximately [_] Days [_] Weeks [_] Months  [_] Pending diagnostic testing.  [x] Pending surgical treatment.  [_] For approximately _ months (Post Surgery)    PATIENT IS ABLE TO RETURN TO WORK AS OF: re-eval at next appt on 23        COMMENTS      Louis Marroquin MD / GONZALO Trivedi

## 2023-06-19 NOTE — LETTER
Date 23    Re:   Serge Eve    :   [birth year]    Dr. Prado ,           The above named patient is scheduled to have a left shoulder ATS, bicep tenotomy, RTC repair, SAD     On 23.       *Type of Anesthesia: general    This patient needs surgical clearance from your office for this procedure.  Please perform necessary tests in order for this patient to be medically cleared for surgery. Please send or fax the clearance letter with most recent ECHO, EKG or stress test, to our office as soon as possible.     Our fax number is [phone].          We appreciate your help in getting our patient cleared for surgery.  Please feel free to call our office should you have any questions, [phone].             Thank You,  Dr. Louis Marroquin MD

## 2023-06-25 ENCOUNTER — PATIENT MESSAGE (OUTPATIENT)
Dept: ADMINISTRATIVE | Facility: OTHER | Age: 68
End: 2023-06-25
Payer: COMMERCIAL

## 2023-06-26 ENCOUNTER — TELEPHONE (OUTPATIENT)
Dept: ORTHOPEDICS | Facility: CLINIC | Age: 68
End: 2023-06-26
Payer: COMMERCIAL

## 2023-06-26 ENCOUNTER — HOSPITAL ENCOUNTER (OUTPATIENT)
Dept: PREADMISSION TESTING | Facility: HOSPITAL | Age: 68
Discharge: HOME | End: 2023-06-26
Attending: ORTHOPAEDIC SURGERY
Payer: OTHER MISCELLANEOUS

## 2023-06-26 ENCOUNTER — HOSPITAL ENCOUNTER (OUTPATIENT)
Dept: CARDIOLOGY | Facility: HOSPITAL | Age: 68
Discharge: HOME | End: 2023-06-26
Attending: ORTHOPAEDIC SURGERY
Payer: COMMERCIAL

## 2023-06-26 DIAGNOSIS — M75.42 SHOULDER IMPINGEMENT SYNDROME, LEFT: ICD-10-CM

## 2023-06-26 DIAGNOSIS — M75.122 COMPLETE TEAR OF LEFT ROTATOR CUFF, UNSPECIFIED WHETHER TRAUMATIC: ICD-10-CM

## 2023-06-26 DIAGNOSIS — Z01.818 PREOP TESTING: ICD-10-CM

## 2023-06-26 DIAGNOSIS — M75.22 BICIPITAL TENDINITIS OF LEFT SHOULDER: ICD-10-CM

## 2023-06-26 PROCEDURE — 93041 RHYTHM ECG TRACING: CPT

## 2023-06-26 PROCEDURE — 93010 EKG 12-LEAD: ICD-10-PCS | Mod: ,,, | Performed by: STUDENT IN AN ORGANIZED HEALTH CARE EDUCATION/TRAINING PROGRAM

## 2023-06-26 PROCEDURE — 93010 ELECTROCARDIOGRAM REPORT: CPT | Mod: ,,, | Performed by: STUDENT IN AN ORGANIZED HEALTH CARE EDUCATION/TRAINING PROGRAM

## 2023-06-26 PROCEDURE — 99900031 HC PATIENT EDUCATION (STAT)

## 2023-06-26 PROCEDURE — 93005 ELECTROCARDIOGRAM TRACING: CPT

## 2023-06-27 ENCOUNTER — PATIENT MESSAGE (OUTPATIENT)
Dept: ADMINISTRATIVE | Facility: OTHER | Age: 68
End: 2023-06-27
Payer: COMMERCIAL

## 2023-06-27 NOTE — TELEPHONE ENCOUNTER
Spoke to Meche (workers comp) she stated that she got all the documentation that was sent, but we will not have an answer for 5-6 days.     How to proceed?

## 2023-07-03 ENCOUNTER — TELEPHONE (OUTPATIENT)
Dept: ORTHOPEDICS | Facility: CLINIC | Age: 68
End: 2023-07-03
Payer: COMMERCIAL

## 2023-07-03 ENCOUNTER — PATIENT MESSAGE (OUTPATIENT)
Dept: ADMINISTRATIVE | Facility: OTHER | Age: 68
End: 2023-07-03
Payer: COMMERCIAL

## 2023-07-03 NOTE — TELEPHONE ENCOUNTER
Attempted to call workers comp agent for update on status of auth for surgery.     No answer left vm.

## 2023-07-04 ENCOUNTER — PATIENT MESSAGE (OUTPATIENT)
Dept: ADMINISTRATIVE | Facility: OTHER | Age: 68
End: 2023-07-04
Payer: COMMERCIAL

## 2023-07-05 ENCOUNTER — TELEPHONE (OUTPATIENT)
Dept: ORTHOPEDICS | Facility: CLINIC | Age: 68
End: 2023-07-05
Payer: COMMERCIAL

## 2023-07-05 DIAGNOSIS — M75.42 SHOULDER IMPINGEMENT SYNDROME, LEFT: Primary | ICD-10-CM

## 2023-07-05 DIAGNOSIS — M75.22 BICIPITAL TENDINITIS OF LEFT SHOULDER: ICD-10-CM

## 2023-07-05 DIAGNOSIS — M75.122 COMPLETE TEAR OF LEFT ROTATOR CUFF, UNSPECIFIED WHETHER TRAUMATIC: ICD-10-CM

## 2023-07-05 NOTE — TELEPHONE ENCOUNTER
Spoke to Meche about changing hospital due to change in surgery date - she stated that they do not care where surgery takes place.     Called patient to advise him that his surgery would be Friday 7/7. Pt verbalized understanding and agreed to having surgery in [place].

## 2023-07-06 ENCOUNTER — ANESTHESIA EVENT (OUTPATIENT)
Dept: SURGERY | Facility: HOSPITAL | Age: 68
End: 2023-07-06
Payer: OTHER MISCELLANEOUS

## 2023-07-06 NOTE — H&P
Admission History & Physical    Subjective:    CC: No chief complaint on file.       HPI:  Serge Prado presents today for preoperative evaluation for left shoulder arthroscopy debridement biceps tenotomy mini open rotator cuff repair with subacromial decompression. I reviewed the indications for surgery. The risks and benefits of the proposed and alternative treatments were discussed with the patient. Questions pertinent to the procedure were solicited and answered. Dr. Marroquin was available to answer any questions with instruction to call clinic with any further questions.No assurances were given. Informed consent was obtained. The patient expressed good understanding and wished to proceed with scheduling the procedure.     ROS:   Constitutional: No fever, weakness, or fatigue.   Ear/Nose/Mouth/Throat: No nasal congestion or sore throat.   Respiratory: No shortness of breath or cough.   Cardiovascular: No chest pain, palpitations, or peripheral edema.   Gastrointestinal: No nausea, vomiting, or abdominal pain.   Genitourinary: No dysuria.  Musculoskeletal:  Left shoulder pain swelling loss of motion    Past Surgical History:   Procedure Laterality Date    ABLATION N/A 12/08/2022    Procedure: ABLATION;  Surgeon: Scout Cary MD;  Location: [facility] CATH LAB;  Service: Cardiology;  Laterality: N/A;  EPS + AF CATH ABLATION W/ KETTY & ANEST.    BACK SURGERY      COLONOSCOPY  07/31/2018    Nitin Batres MD    right knee repair Right     SHOULDER ARTHROSCOPY Right     KETTY/DCCV  07/2022        Past Medical History:   Diagnosis Date    Atrial fibrillation with RVR     Back pain     GERD (gastroesophageal reflux disease)     Hemorrhoids     Hyperlipidemia     Hypertension         Objective:    There were no vitals filed for this visit.     Physical Exam:    Appearance: No distress, good color on room air. Alert and cooperative.  HEENT: Normocephalic. PERRLA EOM intact.   Lungs: Breathing unlabored.  Heart: Regular rate  and rhythm.  Abdomen: Soft, non-tender.  No rebound tenderness.  Extremities:  Patient is well-nourished and well-developed, in no apparent distress, pleasant and cooperative. Examination of the left upper extremity compartments are soft and warm.  Skin is intact. There are no signs or symptoms of DVT or infection.   Patient is tender to palpation along the  anterolateral aspect .  Patient is able to forward flex and abduct to 85°.  Positive Victor and Neers, positive empty can, positive drop arm test. Negative sulcus sign. Stable to stressing. Neurovascularly intact distally.  Skin: No rashes or open wounds.        Assessment:  Left shoulder full-thickness retracted rotator cuff tear, subacromial impingement, bicipital tendinitis with tearing  Plan:  Plan for left shoulder arthroscopy debridement biceps tenotomy mini open rotator cuff repair with subacromial decompression at [facility]. The patient has been given preoperative instructions and prescriptions for post-operative medication. Post-operative appointment is scheduled for 2 weeks.

## 2023-07-07 ENCOUNTER — ANESTHESIA (OUTPATIENT)
Dept: SURGERY | Facility: HOSPITAL | Age: 68
End: 2023-07-07
Payer: OTHER MISCELLANEOUS

## 2023-07-07 ENCOUNTER — HOSPITAL ENCOUNTER (OUTPATIENT)
Facility: HOSPITAL | Age: 68
Discharge: HOME | End: 2023-07-07
Attending: ORTHOPAEDIC SURGERY | Admitting: ORTHOPAEDIC SURGERY
Payer: OTHER MISCELLANEOUS

## 2023-07-07 DIAGNOSIS — T14.90XA ORTHOPEDIC TRAUMA: ICD-10-CM

## 2023-07-07 DIAGNOSIS — S46.012A TRAUMATIC COMPLETE TEAR OF LEFT ROTATOR CUFF, INITIAL ENCOUNTER: Primary | ICD-10-CM

## 2023-07-07 PROBLEM — M75.122 COMPLETE TEAR OF LEFT ROTATOR CUFF: Status: ACTIVE | Noted: 2023-07-07

## 2023-07-07 PROCEDURE — 64415 LEFT SINGLE SHOT INTERSCALENE BLOCK: ICD-10-PCS | Mod: 59,LT,, | Performed by: STUDENT IN AN ORGANIZED HEALTH CARE EDUCATION/TRAINING PROGRAM

## 2023-07-07 PROCEDURE — 37000009 HC ANESTHESIA EA ADD 15 MINS: Performed by: ORTHOPAEDIC SURGERY

## 2023-07-07 PROCEDURE — 63600175 PHARM REV CODE 636 W HCPCS: Performed by: ORTHOPAEDIC SURGERY

## 2023-07-07 PROCEDURE — 63600175 PHARM REV CODE 636 W HCPCS

## 2023-07-07 PROCEDURE — D9220A PRA ANESTHESIA: ICD-10-PCS | Mod: CRNA,,, | Performed by: NURSE ANESTHETIST, CERTIFIED REGISTERED

## 2023-07-07 PROCEDURE — 29822 SHO ARTHRS SRG LMTD DBRDMT: CPT | Mod: 51,LT,, | Performed by: ORTHOPAEDIC SURGERY

## 2023-07-07 PROCEDURE — D9220A PRA ANESTHESIA: ICD-10-PCS | Mod: ANES,,, | Performed by: STUDENT IN AN ORGANIZED HEALTH CARE EDUCATION/TRAINING PROGRAM

## 2023-07-07 PROCEDURE — 36000711: Performed by: ORTHOPAEDIC SURGERY

## 2023-07-07 PROCEDURE — 71000016 HC POSTOP RECOV ADDL HR: Performed by: ORTHOPAEDIC SURGERY

## 2023-07-07 PROCEDURE — 25000003 PHARM REV CODE 250: Performed by: NURSE ANESTHETIST, CERTIFIED REGISTERED

## 2023-07-07 PROCEDURE — 64415 NJX AA&/STRD BRCH PLXS IMG: CPT | Performed by: STUDENT IN AN ORGANIZED HEALTH CARE EDUCATION/TRAINING PROGRAM

## 2023-07-07 PROCEDURE — 23412 PR REPAIR ROTATOR CUFF,CHRONIC: ICD-10-PCS | Mod: LT,,, | Performed by: ORTHOPAEDIC SURGERY

## 2023-07-07 PROCEDURE — 29822 PR SHLDR ARTHROSCOP,PART DEBRIDE: ICD-10-PCS | Mod: 51,LT,, | Performed by: ORTHOPAEDIC SURGERY

## 2023-07-07 PROCEDURE — D9220A PRA ANESTHESIA: Mod: ANES,,, | Performed by: STUDENT IN AN ORGANIZED HEALTH CARE EDUCATION/TRAINING PROGRAM

## 2023-07-07 PROCEDURE — 23412 REPAIR ROTATOR CUFF CHRONIC: CPT | Mod: LT,,, | Performed by: ORTHOPAEDIC SURGERY

## 2023-07-07 PROCEDURE — 63600175 PHARM REV CODE 636 W HCPCS: Performed by: NURSE ANESTHETIST, CERTIFIED REGISTERED

## 2023-07-07 PROCEDURE — C1889 IMPLANT/INSERT DEVICE, NOC: HCPCS | Performed by: ORTHOPAEDIC SURGERY

## 2023-07-07 PROCEDURE — 36000710: Performed by: ORTHOPAEDIC SURGERY

## 2023-07-07 PROCEDURE — C1713 ANCHOR/SCREW BN/BN,TIS/BN: HCPCS | Performed by: ORTHOPAEDIC SURGERY

## 2023-07-07 PROCEDURE — 63600175 PHARM REV CODE 636 W HCPCS: Performed by: STUDENT IN AN ORGANIZED HEALTH CARE EDUCATION/TRAINING PROGRAM

## 2023-07-07 PROCEDURE — D9220A PRA ANESTHESIA: Mod: CRNA,,, | Performed by: NURSE ANESTHETIST, CERTIFIED REGISTERED

## 2023-07-07 PROCEDURE — 71000015 HC POSTOP RECOV 1ST HR: Performed by: ORTHOPAEDIC SURGERY

## 2023-07-07 PROCEDURE — 37000008 HC ANESTHESIA 1ST 15 MINUTES: Performed by: ORTHOPAEDIC SURGERY

## 2023-07-07 PROCEDURE — 27201423 OPTIME MED/SURG SUP & DEVICES STERILE SUPPLY: Performed by: ORTHOPAEDIC SURGERY

## 2023-07-07 PROCEDURE — 71000033 HC RECOVERY, INTIAL HOUR: Performed by: ORTHOPAEDIC SURGERY

## 2023-07-07 DEVICE — IMPLANTABLE DEVICE: Type: IMPLANTABLE DEVICE | Site: SHOULDER | Status: FUNCTIONAL

## 2023-07-07 RX ORDER — SODIUM CHLORIDE 9 MG/ML
INJECTION, SOLUTION INTRAVENOUS CONTINUOUS
Status: DISCONTINUED | OUTPATIENT
Start: 2023-07-07 | End: 2023-07-07 | Stop reason: HOSPADM

## 2023-07-07 RX ORDER — ONDANSETRON 2 MG/ML
4 INJECTION INTRAMUSCULAR; INTRAVENOUS EVERY 6 HOURS PRN
Status: DISCONTINUED | OUTPATIENT
Start: 2023-07-07 | End: 2023-07-07 | Stop reason: HOSPADM

## 2023-07-07 RX ORDER — DEXAMETHASONE SODIUM PHOSPHATE 4 MG/ML
INJECTION, SOLUTION INTRA-ARTICULAR; INTRALESIONAL; INTRAMUSCULAR; INTRAVENOUS; SOFT TISSUE
Status: DISCONTINUED | OUTPATIENT
Start: 2023-07-07 | End: 2023-07-07

## 2023-07-07 RX ORDER — MIDAZOLAM HYDROCHLORIDE 1 MG/ML
INJECTION INTRAMUSCULAR; INTRAVENOUS
Status: COMPLETED
Start: 2023-07-07 | End: 2023-07-07

## 2023-07-07 RX ORDER — FENTANYL CITRATE 50 UG/ML
INJECTION, SOLUTION INTRAMUSCULAR; INTRAVENOUS
Status: DISCONTINUED | OUTPATIENT
Start: 2023-07-07 | End: 2023-07-07

## 2023-07-07 RX ORDER — ONDANSETRON 2 MG/ML
4 INJECTION INTRAMUSCULAR; INTRAVENOUS DAILY PRN
Status: DISCONTINUED | OUTPATIENT
Start: 2023-07-07 | End: 2023-07-07 | Stop reason: HOSPADM

## 2023-07-07 RX ORDER — CEFAZOLIN SODIUM IN 0.9 % NACL 3 G/100 ML
3 INTRAVENOUS SOLUTION, PIGGYBACK (ML) INTRAVENOUS ONCE
Status: COMPLETED | OUTPATIENT
Start: 2023-07-07 | End: 2023-07-07

## 2023-07-07 RX ORDER — CALCIUM CARBONATE 200(500)MG
500 TABLET,CHEWABLE ORAL 3 TIMES DAILY PRN
Status: DISCONTINUED | OUTPATIENT
Start: 2023-07-07 | End: 2023-07-07 | Stop reason: HOSPADM

## 2023-07-07 RX ORDER — MIDAZOLAM HYDROCHLORIDE 1 MG/ML
INJECTION INTRAMUSCULAR; INTRAVENOUS
Status: DISCONTINUED | OUTPATIENT
Start: 2023-07-07 | End: 2023-07-07

## 2023-07-07 RX ORDER — EPINEPHRINE 1 MG/ML
INJECTION, SOLUTION, CONCENTRATE INTRAVENOUS
Status: DISCONTINUED
Start: 2023-07-07 | End: 2023-07-07 | Stop reason: HOSPADM

## 2023-07-07 RX ORDER — ROPIVACAINE HYDROCHLORIDE 5 MG/ML
INJECTION, SOLUTION EPIDURAL; INFILTRATION; PERINEURAL
Status: COMPLETED
Start: 2023-07-07 | End: 2023-07-07

## 2023-07-07 RX ORDER — ROPIVACAINE HYDROCHLORIDE 5 MG/ML
INJECTION, SOLUTION EPIDURAL; INFILTRATION; PERINEURAL
Status: DISCONTINUED | OUTPATIENT
Start: 2023-07-07 | End: 2023-07-07

## 2023-07-07 RX ORDER — SODIUM CHLORIDE, SODIUM GLUCONATE, SODIUM ACETATE, POTASSIUM CHLORIDE AND MAGNESIUM CHLORIDE 30; 37; 368; 526; 502 MG/100ML; MG/100ML; MG/100ML; MG/100ML; MG/100ML
INJECTION, SOLUTION INTRAVENOUS CONTINUOUS
Status: DISCONTINUED | OUTPATIENT
Start: 2023-07-07 | End: 2023-07-07 | Stop reason: HOSPADM

## 2023-07-07 RX ORDER — ROCURONIUM BROMIDE 10 MG/ML
INJECTION, SOLUTION INTRAVENOUS
Status: DISCONTINUED | OUTPATIENT
Start: 2023-07-07 | End: 2023-07-07

## 2023-07-07 RX ORDER — PROPOFOL 10 MG/ML
VIAL (ML) INTRAVENOUS
Status: DISCONTINUED | OUTPATIENT
Start: 2023-07-07 | End: 2023-07-07

## 2023-07-07 RX ORDER — DIPHENHYDRAMINE HYDROCHLORIDE 50 MG/ML
12.5 INJECTION INTRAMUSCULAR; INTRAVENOUS ONCE AS NEEDED
Status: DISCONTINUED | OUTPATIENT
Start: 2023-07-07 | End: 2023-07-07 | Stop reason: HOSPADM

## 2023-07-07 RX ORDER — EPHEDRINE SULFATE 50 MG/ML
INJECTION, SOLUTION INTRAVENOUS
Status: DISCONTINUED | OUTPATIENT
Start: 2023-07-07 | End: 2023-07-07

## 2023-07-07 RX ORDER — FENTANYL CITRATE 50 UG/ML
25 INJECTION, SOLUTION INTRAMUSCULAR; INTRAVENOUS EVERY 5 MIN PRN
Status: DISCONTINUED | OUTPATIENT
Start: 2023-07-07 | End: 2023-07-07 | Stop reason: HOSPADM

## 2023-07-07 RX ORDER — METHOCARBAMOL 500 MG/1
500 TABLET, FILM COATED ORAL EVERY 6 HOURS PRN
Status: DISCONTINUED | OUTPATIENT
Start: 2023-07-07 | End: 2023-07-07 | Stop reason: HOSPADM

## 2023-07-07 RX ORDER — LIDOCAINE HYDROCHLORIDE 10 MG/ML
INJECTION, SOLUTION EPIDURAL; INFILTRATION; INTRACAUDAL; PERINEURAL
Status: DISCONTINUED | OUTPATIENT
Start: 2023-07-07 | End: 2023-07-07

## 2023-07-07 RX ORDER — MAG HYDROX/ALUMINUM HYD/SIMETH 200-200-20
30 SUSPENSION, ORAL (FINAL DOSE FORM) ORAL EVERY 6 HOURS PRN
Status: DISCONTINUED | OUTPATIENT
Start: 2023-07-07 | End: 2023-07-07 | Stop reason: HOSPADM

## 2023-07-07 RX ORDER — OXYCODONE AND ACETAMINOPHEN 10; 325 MG/1; MG/1
1 TABLET ORAL EVERY 8 HOURS PRN
Qty: 21 TABLET | Refills: 0 | Status: SHIPPED | OUTPATIENT
Start: 2023-07-07 | End: 2023-07-11 | Stop reason: SDUPTHER

## 2023-07-07 RX ORDER — PHENYLEPHRINE HYDROCHLORIDE 10 MG/ML
INJECTION INTRAVENOUS
Status: DISCONTINUED | OUTPATIENT
Start: 2023-07-07 | End: 2023-07-07

## 2023-07-07 RX ORDER — MEPERIDINE HYDROCHLORIDE 25 MG/ML
12.5 INJECTION INTRAMUSCULAR; INTRAVENOUS; SUBCUTANEOUS ONCE AS NEEDED
Status: DISCONTINUED | OUTPATIENT
Start: 2023-07-07 | End: 2023-07-07 | Stop reason: HOSPADM

## 2023-07-07 RX ORDER — MIDAZOLAM HYDROCHLORIDE 1 MG/ML
2 INJECTION INTRAMUSCULAR; INTRAVENOUS ONCE
Status: COMPLETED | OUTPATIENT
Start: 2023-07-07 | End: 2023-07-07

## 2023-07-07 RX ORDER — METOCLOPRAMIDE HYDROCHLORIDE 5 MG/ML
10 INJECTION INTRAMUSCULAR; INTRAVENOUS EVERY 6 HOURS PRN
Status: DISCONTINUED | OUTPATIENT
Start: 2023-07-07 | End: 2023-07-07 | Stop reason: HOSPADM

## 2023-07-07 RX ORDER — ONDANSETRON 2 MG/ML
INJECTION INTRAMUSCULAR; INTRAVENOUS
Status: DISCONTINUED | OUTPATIENT
Start: 2023-07-07 | End: 2023-07-07

## 2023-07-07 RX ADMIN — PHENYLEPHRINE HYDROCHLORIDE 100 MCG: 10 INJECTION INTRAVENOUS at 10:07

## 2023-07-07 RX ADMIN — FENTANYL CITRATE 50 MCG: 50 INJECTION, SOLUTION INTRAMUSCULAR; INTRAVENOUS at 11:07

## 2023-07-07 RX ADMIN — SODIUM CHLORIDE, SODIUM GLUCONATE, SODIUM ACETATE, POTASSIUM CHLORIDE AND MAGNESIUM CHLORIDE: 526; 502; 368; 37; 30 INJECTION, SOLUTION INTRAVENOUS at 09:07

## 2023-07-07 RX ADMIN — LIDOCAINE HYDROCHLORIDE 50 MG: 10 INJECTION, SOLUTION EPIDURAL; INFILTRATION; INTRACAUDAL; PERINEURAL at 10:07

## 2023-07-07 RX ADMIN — MIDAZOLAM 2 MG: 1 INJECTION INTRAMUSCULAR; INTRAVENOUS at 09:07

## 2023-07-07 RX ADMIN — FENTANYL CITRATE 50 MCG: 50 INJECTION, SOLUTION INTRAMUSCULAR; INTRAVENOUS at 10:07

## 2023-07-07 RX ADMIN — MIDAZOLAM HYDROCHLORIDE 2 MG: 1 INJECTION INTRAMUSCULAR; INTRAVENOUS at 09:07

## 2023-07-07 RX ADMIN — DEXAMETHASONE SODIUM PHOSPHATE 8 MG: 4 INJECTION, SOLUTION INTRA-ARTICULAR; INTRALESIONAL; INTRAMUSCULAR; INTRAVENOUS; SOFT TISSUE at 10:07

## 2023-07-07 RX ADMIN — ONDANSETRON HYDROCHLORIDE 4 MG: 2 SOLUTION INTRAMUSCULAR; INTRAVENOUS at 10:07

## 2023-07-07 RX ADMIN — ROPIVACAINE HYDROCHLORIDE 30 ML: 5 INJECTION, SOLUTION EPIDURAL; INFILTRATION; PERINEURAL at 09:07

## 2023-07-07 RX ADMIN — SUGAMMADEX 300 MG: 100 INJECTION, SOLUTION INTRAVENOUS at 11:07

## 2023-07-07 RX ADMIN — PROPOFOL 200 MG: 10 INJECTION, EMULSION INTRAVENOUS at 10:07

## 2023-07-07 RX ADMIN — EPHEDRINE SULFATE 15 MG: 50 INJECTION INTRAVENOUS at 10:07

## 2023-07-07 RX ADMIN — ROCURONIUM BROMIDE 50 MG: 10 SOLUTION INTRAVENOUS at 10:07

## 2023-07-07 RX ADMIN — Medication 3 G: at 10:07

## 2023-07-07 NOTE — PLAN OF CARE
Pt ready for discharge, tolerated procedure well, sling and ice pack in place due to nerve block. Instructions given, verbalize understanding

## 2023-07-07 NOTE — TRANSFER OF CARE
Anesthesia Transfer of Care Note    Patient: Serge Prado    Procedure(s) Performed: Procedure(s) (LRB):  REPAIR, ROTATOR CUFF, ARTHROSCOPIC (Left)  TENOTOMY, BICEPS, ARTHROSCOPIC (Left)  DEBRIDEMENT, SHOULDER, ARTHROSCOPIC (Left)    Patient location: PACU    Anesthesia Type: general    Transport from OR: Transported from OR on room air with adequate spontaneous ventilation    Post pain: adequate analgesia    Post assessment: no apparent anesthetic complications and tolerated procedure well    Post vital signs: stable    Level of consciousness: awake and alert    Nausea/Vomiting: no nausea/vomiting    Complications: none    Transfer of care protocol was followed

## 2023-07-07 NOTE — BRIEF OP NOTE
[facility] - Periop Services  Brief Operative Note    Surgery Date: 7/7/2023     Surgeon(s) and Role:     * Louis Marroquin MD - Primary    Assisting Surgeon: None    Pre-op Diagnosis:  Shoulder impingement syndrome, left [M75.42]  Complete tear of left rotator cuff, unspecified whether traumatic [M75.122]  Bicipital tendinitis of left shoulder [M75.22]    Post-op Diagnosis:  Post-Op Diagnosis Codes:     * Shoulder impingement syndrome, left [M75.42]     * Complete tear of left rotator cuff, unspecified whether traumatic [M75.122]     * Bicipital tendinitis of left shoulder [M75.22]    Procedure(s) (LRB):  REPAIR, ROTATOR CUFF, ARTHROSCOPIC (Left)  TENOTOMY, BICEPS, ARTHROSCOPIC (Left)  DEBRIDEMENT, SHOULDER, ARTHROSCOPIC (Left)    Anesthesia: General    Operative Findings: Left scope keara, mo rtc with sad    Estimated Blood Loss: * No values recorded between 7/7/2023 10:34 AM and 7/7/2023 11:08 AM *         Specimens:   Specimen (24h ago, onward)      None              Discharge Note    OUTCOME: Patient tolerated treatment/procedure well without complication and is now ready for discharge.    DISPOSITION: Home or Self Care    FINAL DIAGNOSIS:  Complete tear of left rotator cuff    FOLLOWUP: In clinic    DISCHARGE INSTRUCTIONS:    Discharge Procedure Orders   SLING ORTHOPEDIC MEDIUM FOR HOME USE     Diet general     Ice to affected area     Lifting restrictions     No driving, operating heavy equipment or signing legal documents while taking pain medication.     Other restrictions (specify):   Order Comments: Ok to come out of sling for pendulum exercises, otherwise continue sling. No heavy lifting.     Remove dressing in 72 hours     Wound care routine (specify)   Order Comments: Wound care routine: keep dressing clean, dry, and intact. Ok to remove in 3 days and shower. No submersion.     Call MD for:  temperature >100.4     Call MD for:  persistent nausea and vomiting     Call MD for:  severe  uncontrolled pain     Call MD for:  difficulty breathing, headache or visual disturbances     Call MD for:  redness, tenderness, or signs of infection (pain, swelling, redness, odor or green/yellow discharge around incision site)     Call MD for:  hives     Call MD for:  persistent dizziness or light-headedness     Call MD for:  extreme fatigue     Shower on day dressing removed (No bath)

## 2023-07-07 NOTE — ANESTHESIA POSTPROCEDURE EVALUATION
Anesthesia Post Evaluation    Patient: Serge Prado    Procedure(s) Performed: Procedure(s) (LRB):  REPAIR, ROTATOR CUFF, ARTHROSCOPIC (Left)  TENOTOMY, BICEPS, ARTHROSCOPIC (Left)  DEBRIDEMENT, SHOULDER, ARTHROSCOPIC (Left)    Final Anesthesia Type: general      Patient location during evaluation: PACU  Patient participation: Yes- Able to Participate  Level of consciousness: awake and alert  Post-procedure vital signs: reviewed and stable  Pain management: adequate  Airway patency: patent    PONV status at discharge: No PONV  Anesthetic complications: no      Respiratory status: unassisted  Hydration status: euvolemic  Follow-up not needed.          Vitals Value Taken Time   /66 07/07/23 1241   Temp nl 07/07/23 1245   Pulse 66 07/07/23 1244   Resp 18 07/07/23 1230   SpO2 97 % 07/07/23 1244   Vitals shown include unvalidated device data.      Event Time   Out of Recovery 11:56:00         Pain/Eyal Score: Eyal Score: 10 (7/7/2023 11:59 AM)

## 2023-07-07 NOTE — ANESTHESIA PROCEDURE NOTES
"        Bolivar Sykes   2/15/2017 8:40 AM   Office Visit   MRN: [id number]    Department:  [facility]   Dept Phone:  [phone]    Description:  Male : 1951   Provider:  Nona Mcmahan M.D.           Reason for Visit     Establish Care     Medication Refill           Allergies as of 2/15/2017     Allergen Noted Reactions    Morphine 2010         You were diagnosed with     Dyslipidemia   [198591]       Essential hypertension   [4224930]       Prediabetes   [743303]       Gastroesophageal reflux disease, esophagitis presence not specified   [0697491]       Screening for deficiency anemia   [968654]       Screening for hyperlipidemia   [088196]       Screening for thyroid disorder   [V77.0.ICD-9-CM]       Elevated PSA   [282980]       Screening for colon cancer   [155246]       Esophageal stricture   [968925]       Pharyngoesophageal dysphagia   [785485]       Need for vaccination   [150074]         Vital Signs     Blood Pressure Pulse Temperature Height Weight Body Mass Index    130/84 mmHg 60 36.9 °C (98.4 °F) 1.93 m (6' 3.98\") 107.956 kg (238 lb) 28.98 kg/m2    Oxygen Saturation Smoking Status                96% Never Smoker           Basic Information     Date Of Birth Sex Race Ethnicity Preferred Language    1951 Male White Non- English      Problem List              ICD-10-CM Priority Class Noted - Resolved    Tear of lateral cartilage or meniscus of knee, current S83.289A Low  2012 - Present    Essential hypertension I10 High  2013 - Present    Dyslipidemia E78.5 High  2013 - Present    CESAR (obstructive sleep apnea) G47.33 High  2013 - Present    Family history of premature CAD Z82.49 High  2013 - Present    Prediabetes R73.03   2/15/2017 - Present    GERD (gastroesophageal reflux disease) K21.9   2/15/2017 - Present    Pharyngoesophageal dysphagia R13.14   2/15/2017 - Present    Esophageal stricture K22.2   2/15/2017 - Present      Health " Left single shot interscalene block    Patient location during procedure: pre-op   Block not for primary anesthetic.  Reason for block: at surgeon's request and post-op pain management   Post-op Pain Location: left shoulder   Start time: 7/7/2023 9:43 AM  Timeout: 7/7/2023 9:43 AM   End time: 7/7/2023 9:45 AM    Staffing  Authorizing Provider: Charanjit Reddy MD  Performing Provider: Charanjit Reddy MD    Preanesthetic Checklist  Completed: patient identified, IV checked, site marked, risks and benefits discussed, surgical consent, monitors and equipment checked, pre-op evaluation and timeout performed  Peripheral Block  Patient position: sitting  Prep: ChloraPrep  Patient monitoring: heart rate, cardiac monitor, continuous pulse ox, continuous capnometry and frequent blood pressure checks  Block type: interscalene  Laterality: left  Injection technique: single shot  Needle  Needle type: Stimuplex   Needle gauge: 20 G  Needle length: 4 in  Needle localization: anatomical landmarks and ultrasound guidance   -ultrasound image captured on disc.  Assessment  Injection assessment: negative aspiration, negative parasthesia and local visualized surrounding nerve  Paresthesia pain: none  Heart rate change: no  Slow fractionated injection: yes    Medications:    Medications: ropivacaine (NAROPIN) injection 0.5% - Perineural   30 mL - 7/7/2023 9:45:00 AM    Additional Notes  Good view of C5-C7 between anterior and middle scalene muscles, needle guided adjacent to plexus and local dosed without paresthesias.  Good perineural spread noted, no complications.  VSS.  [facility] RN monitoring vitals throughout procedure.  Patient tolerated procedure well.                Maintenance        Date Due Completion Dates    COLONOSCOPY 7/8/1999 ---    IMM ZOSTER VACCINE 7/8/2009 ---    IMM INFLUENZA (1) 9/1/2016 ---    IMM DTaP/Tdap/Td Vaccine (2 - Td) 2/15/2027 2/15/2017            Current Immunizations     13-VALENT PCV PREVNAR 11/6/2015    Pneumococcal polysaccharide vaccine (PPSV-23) 11/9/2016    Tdap Vaccine 2/15/2017      Below and/or attached are the medications your provider expects you to take. Review all of your home medications and newly ordered medications with your provider and/or pharmacist. Follow medication instructions as directed by your provider and/or pharmacist. Please keep your medication list with you and share with your provider. Update the information when medications are discontinued, doses are changed, or new medications (including over-the-counter products) are added; and carry medication information at all times in the event of emergency situations     Allergies:  MORPHINE - (reactions not documented)               Medications  Valid as of: February 15, 2017 - 10:33 AM    Generic Name Brand Name Tablet Size Instructions for use    Aspirin (Tablet Delayed Response) aspirin 81 MG Take 81 mg by mouth.        Atorvastatin Calcium (Tab) LIPITOR 40 MG Take 1 Tab by mouth every day.        Cholecalciferol (Cap) Cholecalciferol 2000 UNIT Take 2,000 Units by mouth.        Metoprolol Tartrate (Tab) LOPRESSOR 25 MG Take 1 Tab by mouth 2 times a day.        Valsartan-Hydrochlorothiazide (Tab) DIOVAN--25 MG Take 1 Tab by mouth every day.        Zoster Vaccine Live (Recon Soln) ZOSTAVAX 17677 UNT/0.65ML Inject 0.65 mL as instructed Once for 1 dose.        .                 Medicines prescribed today were sent to:     [facility] - [place], [place] - [address]    [address] [facility] [address] [address]    Phone: [phone] Fax: [phone]    Open 24 Hours?: No    [facility] [place] [address] - [address] [place] [address]     [address] [facility] [address] [facility] [address] [facility] [address]    Phone: [phone] Fax: [phone]    Open 24 Hours?: No      Medication refill instructions:       If your prescription bottle indicates you have medication refills left, it is not necessary to call your provider’s office. Please contact your pharmacy and they will refill your medication.    If your prescription bottle indicates you do not have any refills left, you may request refills at any time through one of the following ways: The online [employer] system (except Urgent Care), by calling your provider’s office, or by asking your pharmacy to contact your provider’s office with a refill request. Medication refills are processed only during regular business hours and may not be available until the next business day. Your provider may request additional information or to have a follow-up visit with you prior to refilling your medication.   *Please Note: Medication refills are assigned a new Rx number when refilled electronically. Your pharmacy may indicate that no refills were authorized even though a new prescription for the same medication is available at the pharmacy. Please request the medicine by name with the pharmacy before contacting your provider for a refill.        Referral     A referral request has been sent to our patient care coordination department. Please allow 3-5 business days for us to process this request and contact you either by phone or mail. If you do not hear from us by the 5th business day, please call us at [phone].           [employer] Access Code: Activation code not generated  Current [employer] Status: Active

## 2023-07-07 NOTE — OR NURSING
09:40  TIMEOUT DONE    09:43  DR. MOON WILL ATTEMPT TO DO A LEFT SUPRACLAVICULAR NERVE BLOCK.    09:45  BLOCK COMPLETED AND TOLERATED WELL.

## 2023-07-07 NOTE — OP NOTE
DATE OF SURGERY:    7/7/2023      SURGEON:  Louis Marroquin MD    HOSPITAL:  [facility]     SERVICE:  Orthopedics      PREOPERATIVE DIAGNOSIS:  Left shoulder full-thickness retracted rotator cuff tear biceps tendon tear labral tearing subacromial impingement      POSTOPERATIVE DIAGNOSIS:  Same as above.      PROCEDURE:  1. Left shoulder arthroscopy with debridement of labrum and remaining biceps tendon stump 2. Left shoulder mini open full-thickness retracted rotator cuff repair, massive 3. Left shoulder mini open subacromial decompression      ANESTHESIA:  LMA.      IV FLUIDS:  See Anesthesia.      ESTIMATED BLOOD LOSS:  Less than 20 cc.      COUNTS:  Correct.      COMPLICATIONS:  None.      IMPLANTS:    Implant Name Type Inv. Item Serial No.  Lot No. LRB No. Used Action   ventix link knotless anchor    BIOMET [id number] Left 1 Implanted   ventix link knotless anchir 5.5mm PEEK [place]    BIOMET [id number] Left 1 Implanted         DISPOSITION:  Stable to PACU.      INDICATIONS FOR PROCEDURE: Serge Prado is a 67 y.o. old male  with continued pain and loss of motion of the left shoulder.  The patient has failed multiple conservative treatments. Risks, benefits, and alternatives discussed with the patient as well as patient's family in detail.  All questions were answered.  Informed consent was obtained.      PROCEDURE IN DETAIL: The patient was found in preoperative holding by Anesthesia and found fit for surgery.  Patient was taken to the operating room and placed on the operating table in supine position.  All bony prominences were well padded.  Time-out was called to identify correct patient, correct procedure, and correct site, and all were in agreement.  The patient underwent LMA anesthesia without complications.  The patient was then prepped and draped in normal sterile fashion, leaving the left upper extremity exposed for surgery.  The patient was in the modified beach chair position.  Preoperative  antibiotics wer given. Next, through the posterior portal with good backflow, a 1 cm incision was made.  A camera was introduced into the glenohumeral joint.  After this was done, the anterior portal was then made through an incision just lateral to the tip of the coracoid.  Next, examination of the glenohumeral joint demonstrated some mild arthritic changes on the humeral head and glenoid side.  Inferior recess was inspected no loose bodies.  Camera was looking medially where patient had a complete rupture of the biceps tendon.  With use of 3.5 shaver.  The remaining stump and labrum in this area was debrided, there was no additional tears appreciated.  Attention was turned laterally where patient had a massive full-thickness retracted rotator cuff tear near the glenohumeral line.  This involved the supra and infraspinatus tendons.  A 3 cm incision was made over the anterolateral aspect of the left shoulder soft tissue dissection was taken down the fascia were split in line with its fibers.  Next the subacromial space was entered a large amount of bursal tissue was removed.  He did have scraping of the undersurface of the acromion as well.  After the subacromial decompression the rotator cuff was identified.  With use of Allis clamp it was brought back over the humeral head.  It was then secured with 2 anchors.  Was done with 6 strands of suture.  There was no additional tears appreciated, he did have coverage over the humeral head.  After this was done, hemostasis was achieved.  Copious irrigation was used to wash the wound.  The fascia was closed with 0 Vicryl, subcutaneous tissues closed with 2-0 Vicryl, skin was closed with 3-0 nylon sutures.   Xeroform, 4 x 4's, soft tissue dressing, and a shoulder abduction sling were placed on the affected upper extremity.  The patient was awoken by Anesthesia and brought to PACU in stable condition.    ______________________________  Louis Marroquin MD

## 2023-07-07 NOTE — ANESTHESIA PREPROCEDURE EVALUATION
07/07/2023  Serge Prado is a 67 y.o., male.    Other Medical History   Hypertension Hyperlipidemia   GERD (gastroesophageal reflux disease) Atrial fibrillation with RVR   Hemorrhoids      Surgical History  BACK SURGERY SHOULDER ARTHROSCOPY   right knee repair KETTY/DCCV   ABLATION COLONOSCOPY     S/p AF ablation Dec 2022 with no recurrence  tte 2022 nl lvef, mild mr    Pre-op Assessment    I have reviewed the Patient Summary Reports.     I have reviewed the Nursing Notes. I have reviewed the NPO Status.   I have reviewed the Medications.     Review of Systems  Anesthesia Hx:  No problems with previous Anesthesia  History of prior surgery of interest to airway management or planning: Previous anesthesia: General, MAC Denies Family Hx of Anesthesia complications.   Denies Personal Hx of Anesthesia complications.   Social:  Non-Smoker, Social Alcohol Use    Hematology/Oncology:  Hematology Normal   Oncology Normal     EENT/Dental:EENT/Dental Normal   Cardiovascular:   Exercise tolerance: good Hypertension Dysrhythmias atrial fibrillation ECG has been reviewed. History of A-Fib Functional Capacity good / => 4 METS  Hypertension, Essential Hypertension  Disorder of Cardiac Rhythm, Atrial Fibrillation, Hx of Atrial Fibrillation, S/P electrical cardioversion    Pulmonary:  Pulmonary Normal    Hepatic/GI:   GERD    Musculoskeletal:   Left shoulder rotator cuff tear Musculoskeletal General/Symptoms: joint pain. Functional capacity is unlimited. Left shoulder rotator cuff tear   Neurological:  Neurology Normal    Endocrine:  Obesity / BMI > 30  Dermatological:  Skin Normal    Psych:  Psychiatric Normal           Physical Exam  General: Well nourished, Cooperative, Alert and Oriented    Airway:  Mallampati: II   Mouth Opening: Normal  TM Distance: Normal  Neck ROM: Normal ROM    Dental:  Intact        Anesthesia  Plan  Type of Anesthesia, risks & benefits discussed:    Anesthesia Type: Gen ETT  Intra-op Monitoring Plan: Standard ASA Monitors  Post Op Pain Control Plan: multimodal analgesia and peripheral nerve block  Induction:  IV  Informed Consent: Informed consent signed with the Patient and all parties understand the risks and agree with anesthesia plan.  All questions answered. Patient consented to blood products? No  ASA Score: 3  Day of Surgery Review of History & Physical: H&P Update referred to the surgeon/provider.    Ready For Surgery From Anesthesia Perspective.     .

## 2023-07-07 NOTE — DISCHARGE INSTRUCTIONS
[facility] DISCHAR[place] [address]  [phone]    DIET    YOUR FIRST MEAL SHOULD BE LIQUID: I.E. SOUPS, JELLO, JUICE. IF YOUR LIQUID MEAL IS TOLERATED WELL THEN YOU MAY PROGRESS TO A SMALL LIGHT MEAL.   IF NAUSEA AND VOMITING OCCUR RETURN TO THE LIQUID DIET AND PROGRESS TO A NORMAL SOLID DIET SLOWLY.  IF THE NAUSEA AND VOMITING DOES NOT STOP, NOTIFY YOUR HEALTH CARE PROVIDER.      GENERAL ANESTHESIA OR SEDATION    DO NOT DRIVE OR PARTICIPATE IN ANY ACTIVITIES THAT REQUIRE COORDINATION FOR THE NEXT 24 HOURS: I.E. SWIMMING, BIKING, OPERATING HEAVY MACHINERY, COOKING, USING POWER TOOLS, CLIMBING LADDERS.   FOR THE NEXT 24 HOURS DO NOT: DRIVE, DRINK ALCOHOL, MAKE ANY IMPORTANT DECISIONS OR SIGN ANY LEGAL DOCUMENTS.   STAY WITH AN ADULT DURING THE 24 HOURS AFTER YOUR SURGERY.   DRINK ENOUGH FLUIDS TO KEEP YOUR URINE CLEAR TO PALE YELLOW.      PREVENTING CONSTIPATION AFTER SURGERY    EAT FOOD HIGH IN FIBER AND DRINK PLENTY OF FLUIDS, ESPECIALLY WATER.   YOU MAY TAKE AN OVER THE COUNTER FIBER SUPPLEMENT OR STOOL SOFTENER AS DIRECTED ON THE PACKAGE.   STAYING MOBILE, IF POSSIBLE, HELPS TO PREVENT CONSTIPATION.  CONTACT YOUR DOCTOR IF YOU HAVE NOT HAD A BOWEL MOVEMENT IN 3 DAYS AFTER SURGERY.       INFECTION CONTROL    KEEP YOUR DRESSING CLEAN, DRY AND INTACT.   FOLLOW PHYSICIAN INSTRUCTIONS REGARDING REMOVAL OF DRESSING.  DO NOT TOUCH SURGICAL SITE OR APPLY LOTIONS, POWDERS, CREAMS OR OINTMENTS ON YOUR INCISION UNLESS INSTRUCTED TO DO SO BY YOUR HEALTH CARE PROVIDER.  ONCE THE DRESSING HAS BEEN REMOVED, CHECK THE INCISION SITE DAILY FOR: INCREASED REDNESS, SWELLING, FLUID OR BLOOD, WARMTH, PUS, FOUL SMELL OR INCREASED PAIN.      DEEP VEIN THROMBOSIS (DVT)    A DVT IS A BLOOD CLOT THAT CAN DEVELOP IN THE DEEP AND LARGER VEINS OF THE LEG, ARM OR PELVIS.   RISK FACTORS INCLUDE SITTING OR LYING FOR LONG PERIODS OF TIME. THIS INCLUDES RECOVERING FROM SURGERY.  PREVENTION INCLUDES:  AVOID SITTING STILL FOR LONG PERIODS WITHOUT MOVING YOUR LEGS, DO NOT SMOKE AND IF POSSIBLE AVOID MEDICATIONS THAT CONTAIN ESTROGEN.   SIGNS AND SYMPTOMS THAT SHOULD BE REPORTED IMMEDIATELY INCLUDE: SWELLING IN AN ARM OR LEG, WARMTH, REDNESS OR PAIN IN ONE AREA OF THE LEG OR ARM THAT DOES NOT COME FROM THE INCISION. IF THE CLOT IS IN YOUR LEG, THE SYMPTOMS WILL BE WORSE WHEN STANDING OR WALKING.   SIGNS OF A PULMONARY EMBOLISM OR PE (A CLOT THAT MOVED TO YOUR LUNG): SHORTNESS OF BREATH, COUGHING , ESPECIALLY IF ACCOMPANIED WITH BLOODY MUCUS, CHEST PAIN OR RAPID HEART RATE.  IF YOU EXPERIENCE THESE SYMPTOMS, YOU SHOULD GET EMERGENCY TREATMENT RIGHT AWAY. DO NOT WAIT TO SEE IF THESE SYMPTOMS WILL GO AWAY. DO NOT DRIVE YOURSELF TO THE HOSPITAL.       CONTACT YOUR HEALTH CARE PROVIDER IF:    YOU HAVE REDNESS, SWELLING OR PAIN AROUND YOUR INCISION.  YOUR INCISION FEELS WARM TO THE TOUCH OR IS LEAKING EXCESSIVE FLUID/ BLOOD.  YOUR SUTURES OR STAPLES HAVE COME UNDONE.  YOU HAVE A TEMPERATURE .5 OR GREATER.  YOU ARE NOT ABLE TO URINATE WITHIN 6 HOURS AFTER SURGERY.  YOU HAVE UNRESOLVED NAUSEA AND VOMITING.       SEEK IMMEDIATE MEDICAL CARE IF:    YOU HAVE PERSISTENT NAUSEA AND VOMITING.   YOU ARE UNABLE TO EAT OR DRINK.   YOU HAVE DIFFICULTY SPEAKING AND/ OR BREATHING.  YOUR SKIN COLOR APPEARS BLUE OR GRAY.  YOU HAVE A RED STREAK COMING FROM YOUR INCISION.  YOUR INCISION BLEEDS THROUGH THE DRESSING AND DOES NOT STOP WITH GENTLY PRESSURE.  YOU HAVE SEVERE PAIN THAT DOES NOT DECREASE WITH YOUR MEDICATIONS.

## 2023-07-10 VITALS
SYSTOLIC BLOOD PRESSURE: 146 MMHG | HEART RATE: 65 BPM | TEMPERATURE: 99 F | WEIGHT: 267.44 LBS | OXYGEN SATURATION: 98 % | BODY MASS INDEX: 39.61 KG/M2 | HEIGHT: 69 IN | RESPIRATION RATE: 18 BRPM | DIASTOLIC BLOOD PRESSURE: 65 MMHG

## 2023-07-11 RX ORDER — OXYCODONE AND ACETAMINOPHEN 10; 325 MG/1; MG/1
1 TABLET ORAL EVERY 8 HOURS PRN
Qty: 21 TABLET | Refills: 0 | Status: SHIPPED | OUTPATIENT
Start: 2023-07-12 | End: 2023-07-12 | Stop reason: SDUPTHER

## 2023-07-12 RX ORDER — OXYCODONE AND ACETAMINOPHEN 10; 325 MG/1; MG/1
1 TABLET ORAL EVERY 8 HOURS PRN
Qty: 21 TABLET | Refills: 0 | Status: SHIPPED | OUTPATIENT
Start: 2023-07-12 | End: 2023-07-21 | Stop reason: SDUPTHER

## 2023-07-19 ENCOUNTER — OFFICE VISIT (OUTPATIENT)
Dept: ORTHOPEDICS | Facility: CLINIC | Age: 68
End: 2023-07-19
Payer: OTHER MISCELLANEOUS

## 2023-07-19 VITALS — HEIGHT: 69 IN | WEIGHT: 267.44 LBS | BODY MASS INDEX: 39.61 KG/M2

## 2023-07-19 DIAGNOSIS — M75.122 COMPLETE TEAR OF LEFT ROTATOR CUFF, UNSPECIFIED WHETHER TRAUMATIC: ICD-10-CM

## 2023-07-19 DIAGNOSIS — M75.42 SHOULDER IMPINGEMENT SYNDROME, LEFT: Primary | ICD-10-CM

## 2023-07-19 PROCEDURE — 3288F FALL RISK ASSESSMENT DOCD: CPT | Mod: CPTII,,, | Performed by: ORTHOPAEDIC SURGERY

## 2023-07-19 PROCEDURE — 99024 PR POST-OP FOLLOW-UP VISIT: ICD-10-PCS | Mod: ,,, | Performed by: ORTHOPAEDIC SURGERY

## 2023-07-19 PROCEDURE — 1100F PTFALLS ASSESS-DOCD GE2>/YR: CPT | Mod: CPTII,,, | Performed by: ORTHOPAEDIC SURGERY

## 2023-07-19 PROCEDURE — 3008F PR BODY MASS INDEX (BMI) DOCUMENTED: ICD-10-PCS | Mod: CPTII,,, | Performed by: ORTHOPAEDIC SURGERY

## 2023-07-19 PROCEDURE — 99024 POSTOP FOLLOW-UP VISIT: CPT | Mod: ,,, | Performed by: ORTHOPAEDIC SURGERY

## 2023-07-19 PROCEDURE — 3044F PR MOST RECENT HEMOGLOBIN A1C LEVEL <7.0%: ICD-10-PCS | Mod: CPTII,,, | Performed by: ORTHOPAEDIC SURGERY

## 2023-07-19 PROCEDURE — 4010F PR ACE/ARB THEARPY RXD/TAKEN: ICD-10-PCS | Mod: CPTII,,, | Performed by: ORTHOPAEDIC SURGERY

## 2023-07-19 PROCEDURE — 1100F PR PT FALLS ASSESS DOC 2+ FALLS/FALL W/INJURY/YR: ICD-10-PCS | Mod: CPTII,,, | Performed by: ORTHOPAEDIC SURGERY

## 2023-07-19 PROCEDURE — 1159F MED LIST DOCD IN RCRD: CPT | Mod: CPTII,,, | Performed by: ORTHOPAEDIC SURGERY

## 2023-07-19 PROCEDURE — 1125F PR PAIN SEVERITY QUANTIFIED, PAIN PRESENT: ICD-10-PCS | Mod: CPTII,,, | Performed by: ORTHOPAEDIC SURGERY

## 2023-07-19 PROCEDURE — 1160F PR REVIEW ALL MEDS BY PRESCRIBER/CLIN PHARMACIST DOCUMENTED: ICD-10-PCS | Mod: CPTII,,, | Performed by: ORTHOPAEDIC SURGERY

## 2023-07-19 PROCEDURE — 1159F PR MEDICATION LIST DOCUMENTED IN MEDICAL RECORD: ICD-10-PCS | Mod: CPTII,,, | Performed by: ORTHOPAEDIC SURGERY

## 2023-07-19 PROCEDURE — 1125F AMNT PAIN NOTED PAIN PRSNT: CPT | Mod: CPTII,,, | Performed by: ORTHOPAEDIC SURGERY

## 2023-07-19 PROCEDURE — 4010F ACE/ARB THERAPY RXD/TAKEN: CPT | Mod: CPTII,,, | Performed by: ORTHOPAEDIC SURGERY

## 2023-07-19 PROCEDURE — 3044F HG A1C LEVEL LT 7.0%: CPT | Mod: CPTII,,, | Performed by: ORTHOPAEDIC SURGERY

## 2023-07-19 PROCEDURE — 3008F BODY MASS INDEX DOCD: CPT | Mod: CPTII,,, | Performed by: ORTHOPAEDIC SURGERY

## 2023-07-19 PROCEDURE — 1160F RVW MEDS BY RX/DR IN RCRD: CPT | Mod: CPTII,,, | Performed by: ORTHOPAEDIC SURGERY

## 2023-07-19 PROCEDURE — 3288F PR FALLS RISK ASSESSMENT DOCUMENTED: ICD-10-PCS | Mod: CPTII,,, | Performed by: ORTHOPAEDIC SURGERY

## 2023-07-19 NOTE — PROGRESS NOTES
"Subjective:    CC: Post-op Evaluation of the Left Hand and Post-op Evaluation (F/u L shoulder RTC sx 7/7/23, pt is c/o numbness in his L hand since sx, taking oxycodone for shoulder pain, states no other changes)       HPI:  Patient returns today for his 1st postop visit.  Patient states 4 days after surgery, he had some numbness in his hand that started, he states it is gotten better, he states it is improved coming under the sling.  He denies any new trauma.  He states his shoulder is sore though continues to improve.    ROS: Refer to HPI for pertinent ROS. All other 12 point systems negative.    Objective:  Vitals:    07/19/23 0808   Weight: 121.3 kg (267 lb 6.7 oz)   Height: 5' 9" (1.753 m)        Physical Exam:  Left upper extremity compartment soft and warm.  Skin is intact.  There is no signs symptoms of DVT or infection.  His incisions are healed does have appropriate bruising and swelling about the shoulder.  He has appropriate motion stable to stressing at the hand, he does have some subjective mild numbness along the dorsal surface of the left hand.  He is neurovascular intact distally.    Images: . Images Reviewed and discussed with patient.    Assessment:  1. Shoulder impingement syndrome, left    2. Complete tear of left rotator cuff, unspecified whether traumatic        Plan:  At this time we discussed his physical exam and intraoperative findings.  He is healing nicely we will wean his sling we have discussed some gentle motion exercises, remaining nonweightbearing.  He will hold off on his work duties.  We will start formal therapy, I would like see him back in 4 weeks to see how he is progressing.    Follow UP: No follow-ups on file.              "

## 2023-07-20 ENCOUNTER — TELEPHONE (OUTPATIENT)
Dept: ORTHOPEDICS | Facility: CLINIC | Age: 68
End: 2023-07-20
Payer: COMMERCIAL

## 2023-07-20 NOTE — TELEPHONE ENCOUNTER
Patient called stating that he needs a copy of the office visit note from his visit with Dr. Marroquin from 07/19/2023. This is needed to let his  know for insurance that he is currently still out of work pending treatment.     Nawaf printed out the work status stating that he will be out of his work until his next visit pending treatment.     The patient verbalized a clear understanding.     The wife, Joy, came  this form in the office today.

## 2023-07-21 RX ORDER — OXYCODONE AND ACETAMINOPHEN 10; 325 MG/1; MG/1
1 TABLET ORAL EVERY 8 HOURS PRN
Qty: 21 TABLET | Refills: 0 | Status: SHIPPED | OUTPATIENT
Start: 2023-07-21 | End: 2023-07-21 | Stop reason: SDUPTHER

## 2023-07-21 RX ORDER — OXYCODONE AND ACETAMINOPHEN 10; 325 MG/1; MG/1
1 TABLET ORAL EVERY 8 HOURS PRN
Qty: 21 TABLET | Refills: 0 | Status: SHIPPED | OUTPATIENT
Start: 2023-07-21 | End: 2023-08-01 | Stop reason: SDUPTHER

## 2023-07-21 NOTE — TELEPHONE ENCOUNTER
Called pt's wife and informed her that his medication was sent to [facility] in [place].     Pt verbalized understanding and will call with any questions or concerns.

## 2023-08-01 RX ORDER — OXYCODONE AND ACETAMINOPHEN 10; 325 MG/1; MG/1
1 TABLET ORAL EVERY 8 HOURS PRN
Qty: 21 TABLET | Refills: 0 | Status: SHIPPED | OUTPATIENT
Start: 2023-08-01 | End: 2024-02-29

## 2023-08-16 ENCOUNTER — OFFICE VISIT (OUTPATIENT)
Dept: ORTHOPEDICS | Facility: CLINIC | Age: 68
End: 2023-08-16
Payer: OTHER MISCELLANEOUS

## 2023-08-16 VITALS
DIASTOLIC BLOOD PRESSURE: 72 MMHG | BODY MASS INDEX: 40.01 KG/M2 | HEIGHT: 68 IN | HEART RATE: 101 BPM | SYSTOLIC BLOOD PRESSURE: 136 MMHG | WEIGHT: 264 LBS

## 2023-08-16 DIAGNOSIS — M75.122 COMPLETE TEAR OF LEFT ROTATOR CUFF, UNSPECIFIED WHETHER TRAUMATIC: Primary | ICD-10-CM

## 2023-08-16 DIAGNOSIS — S43.432D LABRAL TEAR OF SHOULDER, LEFT, SUBSEQUENT ENCOUNTER: ICD-10-CM

## 2023-08-16 DIAGNOSIS — M75.22 BICIPITAL TENDINITIS OF LEFT SHOULDER: ICD-10-CM

## 2023-08-16 PROCEDURE — 99024 PR POST-OP FOLLOW-UP VISIT: ICD-10-PCS | Mod: ,,,

## 2023-08-16 PROCEDURE — 99024 POSTOP FOLLOW-UP VISIT: CPT | Mod: ,,,

## 2023-08-16 NOTE — PROGRESS NOTES
Subjective:    CC: Follow-up of the Left Shoulder and Follow-up (f/u Lt shoulder ATS, bicep tenotomy, RTC, SAD 7/7/23-10/5/23 pt just left PT states in pain,going to PT 3 times a week.Taking pain meds)       HPI:  Patient returns to clinic for first post op visit. Status post left shoulder RTC repair and biceps/labral debridement on 7/7/23. Approximately 6 weeks out. The patients pain is still present but manageable; utilizing narcotics sparingly. Patient is on a blood thinner. The patient states no signs of infection. Attending formal PT with progress although slow to gain ROM. No new complaints.    ROS: Refer to HPI for pertinent ROS. All other 12 point systems negative.    Objective:    Vitals:    08/16/23 0858   BP: 136/72   Pulse: 101        Physical Exam:  Left upper extremity compartments are soft and warm. There are no signs or symptoms of DVT or infection. Incision sites are well healed, clean, and dry. Non tender to palpation about the shoulder. Passive range of motion shows that the patient has 100 degrees of abduction and forward flexion; 50 degrees active ROM. Neurovascularly intact distally.    Images:  Previous Images Reviewed and discussed with patient.    Assessment:  1. Complete tear of left rotator cuff, unspecified whether traumatic  - Ambulatory referral/consult to Physical/Occupational Therapy; Future    2. Bicipital tendinitis of left shoulder  - Ambulatory referral/consult to Physical/Occupational Therapy; Future    3. Labral tear of shoulder, left, subsequent encounter  - Ambulatory referral/consult to Physical/Occupational Therapy; Future       Plan:  Physical exam and intraoperative findings discussed with the patient.  Patient is progressing although somewhat slowly. Encouraged to contineu formal PT to work on gaining full ROM; hold off on strengthing until ROM achieved. The Patient was instructed to take Tylenol regularly and wean narcotics. refrain from heavy lifting. We have discussed  his work duties; he will remain off duty as he doesn't feel safe to return. I would like to see the patient back in 6 weeks to assess the patients progress.    Follow up: Follow up in about 6 weeks (around 9/27/2023).               Include Location In Plan?: Yes Detail Level: Detailed Hide Additional Notes?: No

## 2023-09-27 ENCOUNTER — OFFICE VISIT (OUTPATIENT)
Dept: ORTHOPEDICS | Facility: CLINIC | Age: 68
End: 2023-09-27
Payer: OTHER MISCELLANEOUS

## 2023-09-27 VITALS
HEIGHT: 68 IN | HEART RATE: 100 BPM | WEIGHT: 270 LBS | DIASTOLIC BLOOD PRESSURE: 81 MMHG | SYSTOLIC BLOOD PRESSURE: 146 MMHG | BODY MASS INDEX: 40.92 KG/M2

## 2023-09-27 DIAGNOSIS — M75.122 COMPLETE TEAR OF LEFT ROTATOR CUFF, UNSPECIFIED WHETHER TRAUMATIC: Primary | ICD-10-CM

## 2023-09-27 DIAGNOSIS — S43.432D LABRAL TEAR OF SHOULDER, LEFT, SUBSEQUENT ENCOUNTER: ICD-10-CM

## 2023-09-27 DIAGNOSIS — S46.219A BICEPS TENDON TEAR: ICD-10-CM

## 2023-09-27 PROCEDURE — 99024 PR POST-OP FOLLOW-UP VISIT: ICD-10-PCS | Mod: ,,,

## 2023-09-27 PROCEDURE — 99024 POSTOP FOLLOW-UP VISIT: CPT | Mod: ,,,

## 2023-09-27 NOTE — LETTER
[facility] [address]  [place], [address]  Phone: [phone]  Fax: [phone]    Name:Serge Prado  :1955   Date:2023     PATIENT IS UNABLE TO WORK AS OF:23  [_] Pending treatment.  [x] For approximately [_] Days [6] Weeks [_] Months  [_] Pending diagnostic testing.  [_] Pending surgical treatment.  [_] For approximately _ months (Post Surgery)    PATIENT IS ABLE TO RETURN TO WORK AS OF:re-eval at next appt on 23    COMMENTS     Louis Marroquin MD / Adry Good PA-C

## 2023-09-27 NOTE — PROGRESS NOTES
Subjective:    CC: Follow-up of the Left Shoulder (WC 6 week Lt shoulder ATS, bicep tenotomy, RTC, SAD 7/7/23 pt states ROM is improving shoulder still hurts when trying to use hand has to let go because feels like bicep muscle being pulled, pt restarted PT yesterday,went a month w/out PT waiting on approval. Taking Norco for pain states helping.)       HPI:  Patient returns to clinic for repeat post op visit. Status post left shoulder RTC repair and biceps tendon stump/labral debridement on 7/7/23. Almost 3 months out. The patients pain is still present mostly In the biceps region; well managed on Tylenol and occasionally narcotics sparingly. Patient is on a blood thinner. The patient states no signs of infection.  Patient did resume physical therapy yesterday.  He had a period of approximally 1 month that he did not receive physical therapy due to waiting on approval.. No new complaints.    ROS: Refer to HPI for pertinent ROS. All other 12 point systems negative.    Objective:    Vitals:    09/27/23 0753   BP: (!) 146/81   Pulse: 100        Physical Exam:  Left upper extremity compartments are soft and warm. There are no signs or symptoms of DVT or infection. Incision sites are well healed, clean, and dry. Appropriately tender to palpation about the anterior biceps region. Passive range of motion shows that the patient has 130 degrees of abduction and forward flexion; 90° active ROM. Neurovascularly intact distally.    Images:  Previous Images Reviewed and discussed with patient.    Assessment:  1. Complete tear of left rotator cuff, unspecified whether traumatic    2. Biceps tendon tear    3. Labral tear of shoulder, left, subsequent encounter       Plan:  Physical exam and intraoperative findings discussed with the patient.  Patient is overall progressing however slower than anticipated due to lack of therapy for 1 month.  He will continue formal physical therapy to work on range of motion gaining strength.  We  have discussed his work duties; he will remain off duty until his next evaluation.  The Patient was instructed to take OTC pain medication as needed with appropriate precautions. I would like to see the patient back in 6 weeks to assess the patients progress.    Follow up: Follow up in about 6 weeks (around 11/8/2023).

## 2023-10-06 ENCOUNTER — TELEPHONE (OUTPATIENT)
Dept: ORTHOPEDICS | Facility: CLINIC | Age: 68
End: 2023-10-06
Payer: COMMERCIAL

## 2023-10-18 ENCOUNTER — TELEPHONE (OUTPATIENT)
Dept: ORTHOPEDICS | Facility: CLINIC | Age: 68
End: 2023-10-18
Payer: COMMERCIAL

## 2023-10-18 DIAGNOSIS — M75.22 BICIPITAL TENDINITIS OF LEFT SHOULDER: ICD-10-CM

## 2023-10-18 DIAGNOSIS — S43.432D LABRAL TEAR OF SHOULDER, LEFT, SUBSEQUENT ENCOUNTER: ICD-10-CM

## 2023-10-18 DIAGNOSIS — M75.122 COMPLETE TEAR OF LEFT ROTATOR CUFF, UNSPECIFIED WHETHER TRAUMATIC: Primary | ICD-10-CM

## 2023-10-18 DIAGNOSIS — M75.42 SHOULDER IMPINGEMENT SYNDROME, LEFT: ICD-10-CM

## 2023-10-18 DIAGNOSIS — S46.219A BICEPS TENDON TEAR: ICD-10-CM

## 2023-10-18 NOTE — TELEPHONE ENCOUNTER
Jah from Dr. Jose Awad's office called requesting if patient needs any premedication for his dental appt.     Called office back and left vm stating that he does not need any premed due to the type of surgery he had.

## 2023-11-06 ENCOUNTER — OFFICE VISIT (OUTPATIENT)
Dept: ORTHOPEDICS | Facility: CLINIC | Age: 68
End: 2023-11-06
Payer: OTHER MISCELLANEOUS

## 2023-11-06 VITALS — BODY MASS INDEX: 40.92 KG/M2 | WEIGHT: 270 LBS | HEIGHT: 68 IN

## 2023-11-06 DIAGNOSIS — M75.122 COMPLETE TEAR OF LEFT ROTATOR CUFF, UNSPECIFIED WHETHER TRAUMATIC: Primary | ICD-10-CM

## 2023-11-06 PROCEDURE — 99213 PR OFFICE/OUTPT VISIT, EST, LEVL III, 20-29 MIN: ICD-10-PCS | Mod: ,,, | Performed by: ORTHOPAEDIC SURGERY

## 2023-11-06 PROCEDURE — 99213 OFFICE O/P EST LOW 20 MIN: CPT | Mod: ,,, | Performed by: ORTHOPAEDIC SURGERY

## 2023-11-06 NOTE — PROGRESS NOTES
"Subjective:    CC: Follow-up of the Left Shoulder (WC - L shoulder ATS, bicep tenotomy, RTC, SAD, 7/7/23 - pt states that his shoulder is okay, but he is trouble with his bicep. He is doing PT and they are trying dry needling. He states that his shoulder with cramp causing him to drop things. )       HPI:  Patient returns today for repeat exam.  Patient is 4 months now from his left shoulder rotator cuff repair.  Patient states he is doing well, continues to improve, he is going to physical therapy, he still has some troubles with his biceps.    ROS: Refer to HPI for pertinent ROS. All other 12 point systems negative.    Objective:  Vitals:    11/06/23 1321   Weight: 122.5 kg (270 lb)   Height: 5' 8" (1.727 m)        Physical Exam:  Patient is well-nourished and well-developed, in no apparent distress, pleasant and cooperative. Examination of the left upper extremity compartments are soft and warm.  Skin is intact. There are no signs or symptoms of DVT or infection.   Patient is tender to palpation along the  anterior aspect .  Patient is able to forward flex and abduct to 150.  Negative Victor and Neers, negative empty can, negative drop arm test. Negative sulcus sign. Stable to stressing. Neurovascularly intact distally.    Images: . Images Reviewed and discussed with patient.    Assessment:  1. Complete tear of left rotator cuff, unspecified whether traumatic        Plan:  At this time we discussed his physical exam and intraoperative findings.  Patient continues to heal nicely.  He will continue physical therapy to regain his full strength and motion.  I would like see back in 4 weeks to see how he is progressing.  We have discussed hopefully return to work at that time.    Follow UP: No follow-ups on file.              "

## 2023-11-06 NOTE — LETTER
2023    Serge Prado  [address] [place] [address]             [facility] [address] , [address]  [place] [address]  Phone: [phone]     [facility] [address] [place] [address], [address]  Phone: [phone]  Fax: [phone]    Name:Serge Prado  :1955   Date:2023     PATIENT IS UNABLE TO WORK AS OF:2023    [_xx] Pending re-evaluation on 23      COMMENTS       Adry Pitts PA-C

## 2023-12-06 ENCOUNTER — OFFICE VISIT (OUTPATIENT)
Dept: ORTHOPEDICS | Facility: CLINIC | Age: 68
End: 2023-12-06
Payer: OTHER MISCELLANEOUS

## 2023-12-06 VITALS
HEIGHT: 69 IN | SYSTOLIC BLOOD PRESSURE: 145 MMHG | HEART RATE: 106 BPM | BODY MASS INDEX: 39.99 KG/M2 | WEIGHT: 270 LBS | DIASTOLIC BLOOD PRESSURE: 82 MMHG

## 2023-12-06 DIAGNOSIS — M75.122 COMPLETE TEAR OF LEFT ROTATOR CUFF, UNSPECIFIED WHETHER TRAUMATIC: Primary | ICD-10-CM

## 2023-12-06 DIAGNOSIS — M75.22 BICIPITAL TENDINITIS OF LEFT SHOULDER: ICD-10-CM

## 2023-12-06 PROCEDURE — 99213 OFFICE O/P EST LOW 20 MIN: CPT | Mod: ,,, | Performed by: ORTHOPAEDIC SURGERY

## 2023-12-06 PROCEDURE — 99213 PR OFFICE/OUTPT VISIT, EST, LEVL III, 20-29 MIN: ICD-10-PCS | Mod: ,,, | Performed by: ORTHOPAEDIC SURGERY

## 2023-12-06 NOTE — LETTER
[facility]  [address] [place] [address] [address]  [place], [address]  Phone: [phone]  Fax: [phone]    Name:Serge Prado  :1955   Date:2023     PATIENT IS ABLE TO RETURN TO WORK AS OF:2023    [x] REGULAR DUTY: [x] No Restrictions. [_] With Restrictions (See comments below0:    COMMENTS     Louis Marroquin MD / Adry Good PA-C

## 2023-12-06 NOTE — PROGRESS NOTES
"Subjective:    CC: Follow-up of the Left Shoulder (F/u WC Lt shoulder ATS, bicep tenotomy,RTC,SAD, 7/7/23 pt states no pain today, states been awhile since taking pain meds, pt states finished PT Monday.)       HPI:  Patient returns today for repeat exam.  Patient is to be 5 months now from his left shoulder surgery.  He is done very well, he is finished up physical therapy, he denies any new complaints.    ROS: Refer to HPI for pertinent ROS. All other 12 point systems negative.    Objective:  Vitals:    12/06/23 0858   BP: (!) 145/82  Comment: pt states took bp meds this morning   BP Location: Right arm   Patient Position: Sitting   BP Method: Large (Automatic)   Pulse: 106   Weight: 122.5 kg (270 lb)   Height: 5' 9" (1.753 m)        Physical Exam:  Left upper extremity compartment soft and warm.  Skin is intact.  There is no signs symptoms of DVT or infection his incision is well healed he is able to forward flex and abduct to 150 degrees he is stable to stressing there is no instability, negative drop-arm, neurovascular intact distally.    Images: . Images Reviewed and discussed with patient.    Assessment:  1. Complete tear of left rotator cuff, unspecified whether traumatic    2. Bicipital tendinitis of left shoulder        Plan:  At this time we discussed his physical exam and intraoperative findings he has healed nicely we have discussed his return to work, like see back with any problems or difficulties.    Follow UP: No follow-ups on file.              "

## 2024-03-06 ENCOUNTER — LAB VISIT (OUTPATIENT)
Dept: LAB | Facility: HOSPITAL | Age: 69
End: 2024-03-06
Attending: FAMILY MEDICINE
Payer: COMMERCIAL

## 2024-03-06 DIAGNOSIS — Z00.00 WELLNESS EXAMINATION: ICD-10-CM

## 2024-03-06 LAB
ALBUMIN SERPL-MCNC: 4.1 G/DL (ref 3.4–4.8)
ALBUMIN/GLOB SERPL: 1.1 RATIO (ref 1.1–2)
ALP SERPL-CCNC: 93 UNIT/L (ref 40–150)
ALT SERPL-CCNC: 57 UNIT/L (ref 0–55)
AST SERPL-CCNC: 58 UNIT/L (ref 5–34)
BASOPHILS # BLD AUTO: 0.03 X10(3)/MCL
BASOPHILS NFR BLD AUTO: 0.4 %
BILIRUB SERPL-MCNC: 0.6 MG/DL
BUN SERPL-MCNC: 12.3 MG/DL (ref 8.4–25.7)
CALCIUM SERPL-MCNC: 9.8 MG/DL (ref 8.8–10)
CHLORIDE SERPL-SCNC: 102 MMOL/L (ref 98–107)
CHOLEST SERPL-MCNC: 193 MG/DL
CHOLEST/HDLC SERPL: 4 {RATIO} (ref 0–5)
CO2 SERPL-SCNC: 27 MMOL/L (ref 23–31)
CREAT SERPL-MCNC: 1.01 MG/DL (ref 0.73–1.18)
DEPRECATED CALCIDIOL+CALCIFEROL SERPL-MC: 69.4 NG/ML (ref 30–80)
EOSINOPHIL # BLD AUTO: 0.16 X10(3)/MCL (ref 0–0.9)
EOSINOPHIL NFR BLD AUTO: 2.4 %
ERYTHROCYTE [DISTWIDTH] IN BLOOD BY AUTOMATED COUNT: 11.5 % (ref 11.5–17)
EST. AVERAGE GLUCOSE BLD GHB EST-MCNC: 99.7 MG/DL
GFR SERPLBLD CREATININE-BSD FMLA CKD-EPI: >60 MLS/MIN/1.73/M2
GLOBULIN SER-MCNC: 3.8 GM/DL (ref 2.4–3.5)
GLUCOSE SERPL-MCNC: 121 MG/DL (ref 82–115)
HBA1C MFR BLD: 5.1 %
HCT VFR BLD AUTO: 43.7 % (ref 42–52)
HDLC SERPL-MCNC: 48 MG/DL (ref 35–60)
HGB BLD-MCNC: 14.6 G/DL (ref 14–18)
IMM GRANULOCYTES # BLD AUTO: 0.02 X10(3)/MCL (ref 0–0.04)
IMM GRANULOCYTES NFR BLD AUTO: 0.3 %
LDLC SERPL CALC-MCNC: 108 MG/DL (ref 50–140)
LYMPHOCYTES # BLD AUTO: 2.47 X10(3)/MCL (ref 0.6–4.6)
LYMPHOCYTES NFR BLD AUTO: 36.9 %
MCH RBC QN AUTO: 33.9 PG (ref 27–31)
MCHC RBC AUTO-ENTMCNC: 33.4 G/DL (ref 33–36)
MCV RBC AUTO: 101.4 FL (ref 80–94)
MONOCYTES # BLD AUTO: 0.6 X10(3)/MCL (ref 0.1–1.3)
MONOCYTES NFR BLD AUTO: 9 %
NEUTROPHILS # BLD AUTO: 3.41 X10(3)/MCL (ref 2.1–9.2)
NEUTROPHILS NFR BLD AUTO: 51 %
PLATELET # BLD AUTO: 237 X10(3)/MCL (ref 130–400)
PMV BLD AUTO: 9.6 FL (ref 7.4–10.4)
POTASSIUM SERPL-SCNC: 4.7 MMOL/L (ref 3.5–5.1)
PROT SERPL-MCNC: 7.9 GM/DL (ref 5.8–7.6)
PSA SERPL-MCNC: 1.14 NG/ML
RBC # BLD AUTO: 4.31 X10(6)/MCL (ref 4.7–6.1)
SODIUM SERPL-SCNC: 139 MMOL/L (ref 136–145)
T3FREE SERPL-MCNC: 2.92 PG/ML (ref 1.58–3.91)
T4 FREE SERPL-MCNC: 0.88 NG/DL (ref 0.7–1.48)
TRIGL SERPL-MCNC: 184 MG/DL (ref 34–140)
TSH SERPL-ACNC: 3.12 UIU/ML (ref 0.35–4.94)
VIT B12 SERPL-MCNC: 406 PG/ML (ref 213–816)
VLDLC SERPL CALC-MCNC: 37 MG/DL
WBC # SPEC AUTO: 6.69 X10(3)/MCL (ref 4.5–11.5)

## 2024-03-06 PROCEDURE — 84481 FREE ASSAY (FT-3): CPT

## 2024-03-06 PROCEDURE — 80053 COMPREHEN METABOLIC PANEL: CPT

## 2024-03-06 PROCEDURE — 84439 ASSAY OF FREE THYROXINE: CPT

## 2024-03-06 PROCEDURE — 85025 COMPLETE CBC W/AUTO DIFF WBC: CPT

## 2024-03-06 PROCEDURE — 84443 ASSAY THYROID STIM HORMONE: CPT

## 2024-03-06 PROCEDURE — 80061 LIPID PANEL: CPT

## 2024-03-06 PROCEDURE — 83036 HEMOGLOBIN GLYCOSYLATED A1C: CPT

## 2024-03-06 PROCEDURE — 84153 ASSAY OF PSA TOTAL: CPT

## 2024-03-06 PROCEDURE — 82306 VITAMIN D 25 HYDROXY: CPT

## 2024-03-06 PROCEDURE — 36415 COLL VENOUS BLD VENIPUNCTURE: CPT

## 2024-03-06 PROCEDURE — 82607 VITAMIN B-12: CPT

## 2024-04-01 DIAGNOSIS — E78.5 HYPERLIPIDEMIA: Primary | ICD-10-CM

## 2024-04-01 DIAGNOSIS — R79.89 ABNORMAL LIVER FUNCTION TEST: ICD-10-CM

## 2024-04-03 ENCOUNTER — HOSPITAL ENCOUNTER (OUTPATIENT)
Dept: RADIOLOGY | Facility: HOSPITAL | Age: 69
Discharge: HOME | End: 2024-04-03
Attending: INTERNAL MEDICINE
Payer: COMMERCIAL

## 2024-04-03 DIAGNOSIS — R79.89 ABNORMAL LIVER FUNCTION TEST: ICD-10-CM

## 2024-04-03 DIAGNOSIS — E78.5 HYPERLIPIDEMIA: ICD-10-CM

## 2024-04-03 PROCEDURE — 75571 CT HRT W/O DYE W/CA TEST: CPT | Mod: TC

## 2024-04-03 PROCEDURE — 76700 US EXAM ABDOM COMPLETE: CPT | Mod: TC

## 2024-07-31 NOTE — TELEPHONE ENCOUNTER
Both orders look the same?   Patients surgery has been denied by [facility] - they are requesting a p2p.     Dr. Shelby - [phone]    They did not leave a denial reason on the vm.

## 2024-08-23 ENCOUNTER — LAB VISIT (OUTPATIENT)
Dept: LAB | Facility: HOSPITAL | Age: 69
End: 2024-08-23
Attending: INTERNAL MEDICINE
Payer: COMMERCIAL

## 2024-08-23 DIAGNOSIS — I10 ESSENTIAL HYPERTENSION, MALIGNANT: Primary | ICD-10-CM

## 2024-08-23 DIAGNOSIS — E78.5 HYPERLIPIDEMIA, UNSPECIFIED HYPERLIPIDEMIA TYPE: ICD-10-CM

## 2024-08-23 LAB
ALBUMIN SERPL-MCNC: 4.3 G/DL (ref 3.4–4.8)
ALBUMIN/GLOB SERPL: 1.2 RATIO (ref 1.1–2)
ALP SERPL-CCNC: 112 UNIT/L (ref 40–150)
ALT SERPL-CCNC: 48 UNIT/L (ref 0–55)
ANION GAP SERPL CALC-SCNC: 11 MEQ/L
AST SERPL-CCNC: 47 UNIT/L (ref 5–34)
BILIRUB SERPL-MCNC: 0.8 MG/DL
BUN SERPL-MCNC: 14.8 MG/DL (ref 8.4–25.7)
CALCIUM SERPL-MCNC: 9.7 MG/DL (ref 8.8–10)
CHLORIDE SERPL-SCNC: 102 MMOL/L (ref 98–107)
CHOLEST SERPL-MCNC: 115 MG/DL
CHOLEST/HDLC SERPL: 2 {RATIO} (ref 0–5)
CO2 SERPL-SCNC: 23 MMOL/L (ref 23–31)
CREAT SERPL-MCNC: 0.93 MG/DL (ref 0.73–1.18)
CREAT/UREA NIT SERPL: 16
GFR SERPLBLD CREATININE-BSD FMLA CKD-EPI: >60 ML/MIN/1.73/M2
GLOBULIN SER-MCNC: 3.6 GM/DL (ref 2.4–3.5)
GLUCOSE SERPL-MCNC: 114 MG/DL (ref 82–115)
HDLC SERPL-MCNC: 47 MG/DL (ref 35–60)
LDLC SERPL CALC-MCNC: 45 MG/DL (ref 50–140)
POTASSIUM SERPL-SCNC: 4.3 MMOL/L (ref 3.5–5.1)
PROT SERPL-MCNC: 7.9 GM/DL (ref 5.8–7.6)
SODIUM SERPL-SCNC: 136 MMOL/L (ref 136–145)
TRIGL SERPL-MCNC: 113 MG/DL (ref 34–140)
VLDLC SERPL CALC-MCNC: 23 MG/DL

## 2024-08-23 PROCEDURE — 80053 COMPREHEN METABOLIC PANEL: CPT

## 2024-08-23 PROCEDURE — 80061 LIPID PANEL: CPT

## 2024-08-23 PROCEDURE — 36415 COLL VENOUS BLD VENIPUNCTURE: CPT

## 2024-10-01 ENCOUNTER — HOSPITAL ENCOUNTER (OUTPATIENT)
Dept: RADIOLOGY | Facility: HOSPITAL | Age: 69
Discharge: HOME | End: 2024-10-01
Attending: FAMILY MEDICINE
Payer: COMMERCIAL

## 2024-10-01 DIAGNOSIS — M25.551 RIGHT HIP PAIN: ICD-10-CM

## 2024-10-01 PROCEDURE — 73502 X-RAY EXAM HIP UNI 2-3 VIEWS: CPT | Mod: TC,RT

## 2024-10-03 ENCOUNTER — HOSPITAL ENCOUNTER (OUTPATIENT)
Dept: RADIOLOGY | Facility: HOSPITAL | Age: 69
Discharge: HOME | End: 2024-10-03
Attending: FAMILY MEDICINE
Payer: COMMERCIAL

## 2024-10-03 DIAGNOSIS — M25.551 RIGHT HIP PAIN: ICD-10-CM

## 2024-10-03 DIAGNOSIS — Z79.01 ANTICOAGULATED: ICD-10-CM

## 2024-10-03 DIAGNOSIS — S76.211A INGUINAL STRAIN, RIGHT, INITIAL ENCOUNTER: ICD-10-CM

## 2024-10-03 PROCEDURE — 73700 CT LOWER EXTREMITY W/O DYE: CPT | Mod: TC,RT

## 2024-10-28 ENCOUNTER — OFFICE VISIT (OUTPATIENT)
Dept: ORTHOPEDICS | Facility: CLINIC | Age: 69
End: 2024-10-28
Payer: COMMERCIAL

## 2024-10-28 DIAGNOSIS — M17.11 LOCALIZED OSTEOARTHRITIS OF RIGHT KNEE: ICD-10-CM

## 2024-10-28 DIAGNOSIS — M25.561 RIGHT KNEE PAIN, UNSPECIFIED CHRONICITY: Primary | ICD-10-CM

## 2024-10-28 DIAGNOSIS — L03.115 CELLULITIS OF RIGHT LEG: ICD-10-CM

## 2024-10-28 PROCEDURE — 1159F MED LIST DOCD IN RCRD: CPT | Mod: CPTII,,, | Performed by: ORTHOPAEDIC SURGERY

## 2024-10-28 PROCEDURE — 3044F HG A1C LEVEL LT 7.0%: CPT | Mod: CPTII,,, | Performed by: ORTHOPAEDIC SURGERY

## 2024-10-28 PROCEDURE — 4010F ACE/ARB THERAPY RXD/TAKEN: CPT | Mod: CPTII,,, | Performed by: ORTHOPAEDIC SURGERY

## 2024-10-28 PROCEDURE — 99214 OFFICE O/P EST MOD 30 MIN: CPT | Mod: ,,, | Performed by: ORTHOPAEDIC SURGERY

## 2024-10-28 RX ORDER — CEPHALEXIN 500 MG/1
500 CAPSULE ORAL EVERY 6 HOURS
Qty: 40 CAPSULE | Refills: 0 | Status: SHIPPED | OUTPATIENT
Start: 2024-10-28

## 2024-11-06 ENCOUNTER — DOCUMENTATION ONLY (OUTPATIENT)
Dept: ORTHOPEDICS | Facility: CLINIC | Age: 69
End: 2024-11-06
Payer: COMMERCIAL

## 2024-11-06 ENCOUNTER — TELEPHONE (OUTPATIENT)
Dept: ORTHOPEDICS | Facility: CLINIC | Age: 69
End: 2024-11-06
Payer: COMMERCIAL

## 2024-11-06 NOTE — TELEPHONE ENCOUNTER
Attempted to call patient in regards to his appointment for tomorrow. Patient did not answer. Was unable to leave a VM due to it not being set up

## 2024-11-11 ENCOUNTER — HOSPITAL ENCOUNTER (OUTPATIENT)
Dept: RADIOLOGY | Facility: HOSPITAL | Age: 69
Discharge: HOME | End: 2024-11-11
Payer: COMMERCIAL

## 2024-11-11 ENCOUNTER — TELEPHONE (OUTPATIENT)
Dept: PREADMISSION TESTING | Facility: HOSPITAL | Age: 69
End: 2024-11-11
Payer: COMMERCIAL

## 2024-11-11 ENCOUNTER — OFFICE VISIT (OUTPATIENT)
Dept: ORTHOPEDICS | Facility: CLINIC | Age: 69
End: 2024-11-11
Payer: COMMERCIAL

## 2024-11-11 ENCOUNTER — HOSPITAL ENCOUNTER (OUTPATIENT)
Dept: RADIOLOGY | Facility: CLINIC | Age: 69
Discharge: HOME | End: 2024-11-11
Attending: ORTHOPAEDIC SURGERY
Payer: COMMERCIAL

## 2024-11-11 VITALS
BODY MASS INDEX: 39.13 KG/M2 | HEART RATE: 102 BPM | WEIGHT: 264.19 LBS | SYSTOLIC BLOOD PRESSURE: 146 MMHG | HEIGHT: 69 IN | DIASTOLIC BLOOD PRESSURE: 78 MMHG

## 2024-11-11 DIAGNOSIS — M17.11 LOCALIZED OSTEOARTHRITIS OF RIGHT KNEE: Primary | ICD-10-CM

## 2024-11-11 DIAGNOSIS — Z01.818 PREOP TESTING: ICD-10-CM

## 2024-11-11 DIAGNOSIS — M17.11 LOCALIZED OSTEOARTHRITIS OF RIGHT KNEE: ICD-10-CM

## 2024-11-11 LAB — MRSA PCR SCRN (OHS): NOT DETECTED

## 2024-11-11 PROCEDURE — 99214 OFFICE O/P EST MOD 30 MIN: CPT | Mod: ,,,

## 2024-11-11 PROCEDURE — 3077F SYST BP >= 140 MM HG: CPT | Mod: CPTII,,,

## 2024-11-11 PROCEDURE — 3078F DIAST BP <80 MM HG: CPT | Mod: CPTII,,,

## 2024-11-11 PROCEDURE — 73564 X-RAY EXAM KNEE 4 OR MORE: CPT | Mod: RT,,, | Performed by: ORTHOPAEDIC SURGERY

## 2024-11-11 PROCEDURE — 3008F BODY MASS INDEX DOCD: CPT | Mod: CPTII,,,

## 2024-11-11 PROCEDURE — 1160F RVW MEDS BY RX/DR IN RCRD: CPT | Mod: CPTII,,,

## 2024-11-11 PROCEDURE — 1101F PT FALLS ASSESS-DOCD LE1/YR: CPT | Mod: CPTII,,,

## 2024-11-11 PROCEDURE — 3044F HG A1C LEVEL LT 7.0%: CPT | Mod: CPTII,,,

## 2024-11-11 PROCEDURE — 3288F FALL RISK ASSESSMENT DOCD: CPT | Mod: CPTII,,,

## 2024-11-11 PROCEDURE — 71046 X-RAY EXAM CHEST 2 VIEWS: CPT | Mod: TC

## 2024-11-11 PROCEDURE — 4010F ACE/ARB THERAPY RXD/TAKEN: CPT | Mod: CPTII,,,

## 2024-11-11 PROCEDURE — 1159F MED LIST DOCD IN RCRD: CPT | Mod: CPTII,,,

## 2024-11-11 RX ORDER — SODIUM CHLORIDE, SODIUM GLUCONATE, SODIUM ACETATE, POTASSIUM CHLORIDE AND MAGNESIUM CHLORIDE 30; 37; 368; 526; 502 MG/100ML; MG/100ML; MG/100ML; MG/100ML; MG/100ML
INJECTION, SOLUTION INTRAVENOUS CONTINUOUS
OUTPATIENT
Start: 2024-11-11

## 2024-11-11 RX ORDER — SCOLOPAMINE TRANSDERMAL SYSTEM 1 MG/1
1 PATCH, EXTENDED RELEASE TRANSDERMAL ONCE AS NEEDED
OUTPATIENT
Start: 2024-11-11 | End: 2036-04-09

## 2024-11-11 RX ORDER — ACETAMINOPHEN 500 MG
1000 TABLET ORAL
OUTPATIENT
Start: 2024-11-11

## 2024-11-11 RX ORDER — ONDANSETRON 4 MG/1
4 TABLET, ORALLY DISINTEGRATING ORAL
OUTPATIENT
Start: 2024-11-11

## 2024-11-11 RX ORDER — GABAPENTIN 100 MG/1
300 CAPSULE ORAL
OUTPATIENT
Start: 2024-11-11

## 2024-11-11 RX ORDER — TRANEXAMIC ACID 650 MG/1
1950 TABLET ORAL
OUTPATIENT
Start: 2024-11-11 | End: 2024-11-11

## 2024-11-11 NOTE — LETTER
[facility]  [address] [place] [address] [address]  [place], [address]  Phone: [phone]  Fax: [phone]    Name:Serge Prado  :1955   Date:2024     PATIENT IS UNABLE TO WORK AS OF: 24  [_] Pending treatment.  [_] For approximately [_] Days [_] Weeks [_] Months  [_] Pending diagnostic testing.  [x] Pending surgical treatment.  [_] For approximately _ months (Post Surgery)    PATIENT IS ABLE TO RETURN TO WORK AS OF: re-evaluate on 24    COMMENTS     Louis Marroquin MD / Adry Good PA-C

## 2024-11-11 NOTE — H&P
Admission History & Physical    Subjective:    CC: Pre-op Exam (Pre op R TKA )       HPI:  Serge Prado presents today for preoperative evaluation for right total knee arthroplasty with Mando robotic assistance.  He continues to have pain with long standing, walking, bending, climbing.  He states he has difficulty sleeping at night.  He has previously been taking Norco 7.5 from his primary care doctor.  He has recently tried taking just ibuprofen.  He is on Xarelto for his AFib.  He is currently out of work as he does not feel safe to do his job at this time as a .    I reviewed the indications for surgery. The risks and benefits of the proposed and alternative treatments were discussed with the patient. Questions pertinent to the procedure were solicited and answered. Dr. Marroquin was available to answer any questions with instruction to call clinic with any further questions.No assurances were given. Informed consent was obtained. The patient expressed good understanding and wished to proceed with scheduling the procedure.         This patient has  Increased pain with activity Initiation  Interference with normal activities of daily living due to pain  Increased pain with weight bearing activities  Pain with range of motion    This patient has an active or unstable comorbidity that significantly increases the mortality risk and require more than 24 hours of postoperative monitoring.    This comorbidity is: Other: A-fib on xarelto    Patient will be admitted as inpatient status due to: Considering the patient's history of chronic pain and regular opioid usage, patient is at higher risk for inadequate pain control and increased narcotic requirements. Due to a higher risk of over sedation, post-op ileus and altered mental status with increased narcotic consumption, the patient will require closer monitoring in the hospital. Will admit as inpatient, initiate our joint replacement pre-emptive multimodal  pain protocol and adjust pain medications accordingly, monitor respiratory status and bowel function. Plan to discharge patient once pain is adequately controlled, deemed safe and is tolerating medications well.         ROS:   Constitutional: No fever, weakness, or fatigue.   Ear/Nose/Mouth/Throat: No nasal congestion or sore throat.   Respiratory: No shortness of breath or cough.   Cardiovascular: No chest pain, palpitations, or peripheral edema.   Gastrointestinal: No nausea, vomiting, or abdominal pain.   Genitourinary: No dysuria.  Musculoskeletal:  Right knee pain, swelling, loss of motion.    Past Surgical History:   Procedure Laterality Date    ABLATION N/A 12/08/2022    Procedure: ABLATION;  Surgeon: Scout Cary MD;  Location: [facility] CATH LAB;  Service: Cardiology;  Laterality: N/A;  EPS + AF CATH ABLATION W/ KETTY & ANEST.    ARTHROSCOPIC DEBRIDEMENT OF SHOULDER Left 7/7/2023    Procedure: DEBRIDEMENT, SHOULDER, ARTHROSCOPIC;  Surgeon: Louis Marroquin MD;  Location: [facility] OR;  Service: Orthopedics;  Laterality: Left;    ARTHROSCOPIC REPAIR OF ROTATOR CUFF OF SHOULDER Left 7/7/2023    Procedure: REPAIR, ROTATOR CUFF, ARTHROSCOPIC;  Surgeon: Louis Marroquin MD;  Location: [facility] OR;  Service: Orthopedics;  Laterality: Left;    ARTHROSCOPIC TENOTOMY OF BICEPS TENDON Left 7/7/2023    Procedure: TENOTOMY, BICEPS, ARTHROSCOPIC;  Surgeon: Louis Marroquin MD;  Location: [facility] OR;  Service: Orthopedics;  Laterality: Left;    BACK SURGERY      COLONOSCOPY  07/31/2018    Nitin Batres MD    right knee repair Right     SHOULDER ARTHROSCOPY Right     KETTY/DCCV  07/2022        Past Medical History:   Diagnosis Date    Atrial fibrillation with RVR     Back pain     GERD (gastroesophageal reflux disease)     Hemorrhoids     Hyperlipidemia     Hypertension         Objective:    Vitals:    11/11/24 0826   BP: (!) 146/78   Pulse: 102        Physical Exam:    Appearance: No distress, good color on room air. Alert and  cooperative.  HEENT: Normocephalic. PERRLA EOM intact.   Lungs: Breathing unlabored.  Heart: Regular rate and rhythm.  Abdomen: Soft, non-tender.  No rebound tenderness.  Extremities: Examination of the right lower extremity compartments are soft and warm. Skin is intact. There are no signs or symptoms of DVT or infection. There is no obvious joint joint effusion. There is no erythema. Tender to palpation along the medial and lateral joint line , right knee range of motion is 0-95. Cellulitis has resolved. No calf tenderness. The knee is stable to exam with varus and valgus stressing. Negative anterior and posterior drawer. Negative Lachman´s. Negative Hever's test. Patella grind is positive, Negative for apprehension   Skin: No rashes or open wounds.        Assessment:  1. Localized osteoarthritis of right knee  - X-Ray Knee Complete 4 Or More Views Right; Future  - CT Knee w/o Contrast Right w/Mando Protocol; Future  - Case Request Operating Room: ROBOTIC ARTHROPLASTY, KNEE, TOTAL  - Vital signs; Standing  - Insert peripheral IV; Standing  - Clip and Prep Other (please specifiy) (Operative site); Standing  - Cleanse with Chlorhexidine (CHG); Standing  - Apply Nozin; Standing  - Diet NPO; Standing  - electrolyte-A infusion  - IP VTE LOW RISK PATIENT; Standing  - ceFAZolin (Ancef) 2 g in D5W 50 mL IVPB  - acetaminophen tablet 1,000 mg  - gabapentin capsule 300 mg  - ondansetron disintegrating tablet 4 mg  - scopolamine 1.3-1.5 mg (1 mg over 3 days) 1 patch  - tranexamic acid (LYSTEDA) tablet 1,950 mg  - POCT glucose; Standing  - CBC auto differential; Future  - Comprehensive metabolic panel; Future  - Urinalysis; Future  - X-Ray Chest PA And Lateral; Future  - EKG 12-lead; Future  - Inpatient consult to Anesthesiology; Standing  - Admit to Inpatient; Standing  - Place KRYSTAL hose; Standing  - Place sequential compression device; Standing  - MRSA PCR; Future  - Hemoglobin A1C; Future  - MRSA PCR    2. Preop testing  -  CT Knee w/o Contrast Right w/[facility] Protocol; Future  - Case Request Operating Room: ROBOTIC ARTHROPLASTY, KNEE, TOTAL  - Vital signs; Standing  - Insert peripheral IV; Standing  - Clip and Prep Other (please specifiy) (Operative site); Standing  - Cleanse with Chlorhexidine (CHG); Standing  - Apply Nozin; Standing  - Diet NPO; Standing  - electrolyte-A infusion  - IP VTE LOW RISK PATIENT; Standing  - ceFAZolin (Ancef) 2 g in D5W 50 mL IVPB  - acetaminophen tablet 1,000 mg  - gabapentin capsule 300 mg  - ondansetron disintegrating tablet 4 mg  - scopolamine 1.3-1.5 mg (1 mg over 3 days) 1 patch  - tranexamic acid (LYSTEDA) tablet 1,950 mg  - POCT glucose; Standing  - CBC auto differential; Future  - Comprehensive metabolic panel; Future  - Urinalysis; Future  - X-Ray Chest PA And Lateral; Future  - EKG 12-lead; Future  - Inpatient consult to Anesthesiology; Standing  - Admit to Inpatient; Standing  - Place KRYSTAL hose; Standing  - Place sequential compression device; Standing  - MRSA PCR; Future  - Hemoglobin A1C; Future  - MRSA PCR       Plan:  Plan for Right total knee arthroplasty with Mando robotic assistance at [facility]. The patient has been given preoperative instructions. Post-operative appointment is scheduled for 2 weeks.  Patient was given knee exercises for preop rehab.  Patient will need CT of the operative knee for preoperative surgical planning. We have discussed his work duties; he remains to feel unsafe to do his job at this time. He will remain off duty.

## 2024-11-11 NOTE — TELEPHONE ENCOUNTER
Mr. RANCHO Mcmillan is on Xarelto.  Could someone please reach out to his cardiologist to see when to stop it for surgery?  He sees Dr. Gregory in [place].  Thanks

## 2024-11-19 ENCOUNTER — HOSPITAL ENCOUNTER (OUTPATIENT)
Dept: RADIOLOGY | Facility: HOSPITAL | Age: 69
Discharge: HOME | End: 2024-11-19
Payer: COMMERCIAL

## 2024-11-19 DIAGNOSIS — M17.11 LOCALIZED OSTEOARTHRITIS OF RIGHT KNEE: ICD-10-CM

## 2024-11-19 DIAGNOSIS — Z01.818 PREOP TESTING: ICD-10-CM

## 2024-11-19 PROCEDURE — 73700 CT LOWER EXTREMITY W/O DYE: CPT | Mod: TC,RT

## 2024-12-04 ENCOUNTER — ANESTHESIA EVENT (OUTPATIENT)
Dept: SURGERY | Facility: HOSPITAL | Age: 69
End: 2024-12-04
Payer: COMMERCIAL

## 2024-12-06 ENCOUNTER — TELEPHONE (OUTPATIENT)
Dept: ORTHOPEDICS | Facility: CLINIC | Age: 69
End: 2024-12-06
Payer: COMMERCIAL

## 2024-12-06 NOTE — TELEPHONE ENCOUNTER
Spoke to patient about moving his surgery up to 12/10/24 - he stated that he is unable to move up his surgery due his previous arrangements with caregivers.     I advised that I would let you know.     Pt verbalized understanding and will call with any questions or concerns.

## 2024-12-10 ENCOUNTER — PATIENT MESSAGE (OUTPATIENT)
Dept: ADMINISTRATIVE | Facility: OTHER | Age: 69
End: 2024-12-10
Payer: COMMERCIAL

## 2024-12-11 ENCOUNTER — PATIENT MESSAGE (OUTPATIENT)
Dept: ADMINISTRATIVE | Facility: OTHER | Age: 69
End: 2024-12-11
Payer: COMMERCIAL

## 2024-12-12 ENCOUNTER — PATIENT MESSAGE (OUTPATIENT)
Dept: ADMINISTRATIVE | Facility: OTHER | Age: 69
End: 2024-12-12
Payer: COMMERCIAL

## 2024-12-13 ENCOUNTER — ANESTHESIA (OUTPATIENT)
Dept: SURGERY | Facility: HOSPITAL | Age: 69
End: 2024-12-13
Payer: COMMERCIAL

## 2024-12-13 ENCOUNTER — HOSPITAL ENCOUNTER (INPATIENT)
Facility: HOSPITAL | Age: 69
LOS: 1 days | Discharge: HOME | DRG: 470 | End: 2024-12-14
Attending: ORTHOPAEDIC SURGERY | Admitting: ORTHOPAEDIC SURGERY
Payer: COMMERCIAL

## 2024-12-13 DIAGNOSIS — M17.11 LOCALIZED OSTEOARTHRITIS OF RIGHT KNEE: ICD-10-CM

## 2024-12-13 DIAGNOSIS — Z01.818 PREOP TESTING: ICD-10-CM

## 2024-12-13 LAB
HCT VFR BLD AUTO: 35.7 % (ref 42–52)
HGB BLD-MCNC: 12.4 G/DL (ref 14–18)
POCT GLUCOSE: 116 MG/DL (ref 70–110)

## 2024-12-13 PROCEDURE — 97162 PT EVAL MOD COMPLEX 30 MIN: CPT

## 2024-12-13 PROCEDURE — 27447 TOTAL KNEE ARTHROPLASTY: CPT | Mod: AS,RT,,

## 2024-12-13 PROCEDURE — 8E0Y0CZ ROBOTIC ASSISTED PROCEDURE OF LOWER EXTREMITY, OPEN APPROACH: ICD-10-PCS | Performed by: ORTHOPAEDIC SURGERY

## 2024-12-13 PROCEDURE — 25000003 PHARM REV CODE 250

## 2024-12-13 PROCEDURE — 25000003 PHARM REV CODE 250: Performed by: NURSE PRACTITIONER

## 2024-12-13 PROCEDURE — 37000009 HC ANESTHESIA EA ADD 15 MINS: Performed by: ORTHOPAEDIC SURGERY

## 2024-12-13 PROCEDURE — 94799 UNLISTED PULMONARY SVC/PX: CPT

## 2024-12-13 PROCEDURE — A4216 STERILE WATER/SALINE, 10 ML: HCPCS | Performed by: ORTHOPAEDIC SURGERY

## 2024-12-13 PROCEDURE — 63600175 PHARM REV CODE 636 W HCPCS

## 2024-12-13 PROCEDURE — 63600175 PHARM REV CODE 636 W HCPCS: Performed by: ORTHOPAEDIC SURGERY

## 2024-12-13 PROCEDURE — 25000003 PHARM REV CODE 250: Performed by: ORTHOPAEDIC SURGERY

## 2024-12-13 PROCEDURE — 0SRC0J9 REPLACEMENT OF RIGHT KNEE JOINT WITH SYNTHETIC SUBSTITUTE, CEMENTED, OPEN APPROACH: ICD-10-PCS | Performed by: ORTHOPAEDIC SURGERY

## 2024-12-13 PROCEDURE — 0055T BONE SRGRY CMPTR CT/MRI IMAG: CPT | Mod: ,,, | Performed by: ORTHOPAEDIC SURGERY

## 2024-12-13 PROCEDURE — 63600175 PHARM REV CODE 636 W HCPCS: Mod: JZ,JG | Performed by: STUDENT IN AN ORGANIZED HEALTH CARE EDUCATION/TRAINING PROGRAM

## 2024-12-13 PROCEDURE — 71000033 HC RECOVERY, INTIAL HOUR: Performed by: ORTHOPAEDIC SURGERY

## 2024-12-13 PROCEDURE — 85014 HEMATOCRIT: CPT

## 2024-12-13 PROCEDURE — 37000008 HC ANESTHESIA 1ST 15 MINUTES: Performed by: ORTHOPAEDIC SURGERY

## 2024-12-13 PROCEDURE — C1713 ANCHOR/SCREW BN/BN,TIS/BN: HCPCS | Performed by: ORTHOPAEDIC SURGERY

## 2024-12-13 PROCEDURE — 64447 NJX AA&/STRD FEMORAL NRV IMG: CPT | Performed by: STUDENT IN AN ORGANIZED HEALTH CARE EDUCATION/TRAINING PROGRAM

## 2024-12-13 PROCEDURE — 82962 GLUCOSE BLOOD TEST: CPT | Performed by: ORTHOPAEDIC SURGERY

## 2024-12-13 PROCEDURE — C1776 JOINT DEVICE (IMPLANTABLE): HCPCS | Performed by: ORTHOPAEDIC SURGERY

## 2024-12-13 PROCEDURE — 51798 US URINE CAPACITY MEASURE: CPT | Performed by: ORTHOPAEDIC SURGERY

## 2024-12-13 PROCEDURE — 36415 COLL VENOUS BLD VENIPUNCTURE: CPT

## 2024-12-13 PROCEDURE — 27447 TOTAL KNEE ARTHROPLASTY: CPT | Mod: RT,,, | Performed by: ORTHOPAEDIC SURGERY

## 2024-12-13 PROCEDURE — 88305 TISSUE EXAM BY PATHOLOGIST: CPT | Performed by: ORTHOPAEDIC SURGERY

## 2024-12-13 PROCEDURE — 11000001 HC ACUTE MED/SURG PRIVATE ROOM

## 2024-12-13 PROCEDURE — 94761 N-INVAS EAR/PLS OXIMETRY MLT: CPT

## 2024-12-13 PROCEDURE — 36000712 HC OR TIME LEV V 1ST 15 MIN: Performed by: ORTHOPAEDIC SURGERY

## 2024-12-13 PROCEDURE — 88311 DECALCIFY TISSUE: CPT

## 2024-12-13 PROCEDURE — 36000713 HC OR TIME LEV V EA ADD 15 MIN: Performed by: ORTHOPAEDIC SURGERY

## 2024-12-13 PROCEDURE — 27201423 OPTIME MED/SURG SUP & DEVICES STERILE SUPPLY: Performed by: ORTHOPAEDIC SURGERY

## 2024-12-13 PROCEDURE — 99900031 HC PATIENT EDUCATION (STAT)

## 2024-12-13 DEVICE — PRIMARY TIBIAL BASEPLATE
Type: IMPLANTABLE DEVICE | Site: KNEE | Status: FUNCTIONAL
Brand: TRIATHLON

## 2024-12-13 DEVICE — CRUCIATE RETAINING FEMORAL
Type: IMPLANTABLE DEVICE | Site: KNEE | Status: FUNCTIONAL
Brand: TRIATHLON

## 2024-12-13 DEVICE — CEMENT BONE ANTIBIO SIMPLEX P: Type: IMPLANTABLE DEVICE | Site: KNEE | Status: FUNCTIONAL

## 2024-12-13 DEVICE — TIBIAL BEARING INSERT - CS
Type: IMPLANTABLE DEVICE | Site: KNEE | Status: FUNCTIONAL
Brand: TRIATHLON

## 2024-12-13 RX ORDER — HYDROCODONE BITARTRATE AND ACETAMINOPHEN 7.5; 325 MG/1; MG/1
1 TABLET ORAL EVERY 4 HOURS PRN
Status: CANCELLED | OUTPATIENT
Start: 2024-12-13

## 2024-12-13 RX ORDER — GABAPENTIN 300 MG/1
300 CAPSULE ORAL NIGHTLY
Status: DISCONTINUED | OUTPATIENT
Start: 2024-12-13 | End: 2024-12-14 | Stop reason: HOSPADM

## 2024-12-13 RX ORDER — MIDAZOLAM HYDROCHLORIDE 1 MG/ML
INJECTION INTRAMUSCULAR; INTRAVENOUS
Status: DISCONTINUED | OUTPATIENT
Start: 2024-12-13 | End: 2024-12-13

## 2024-12-13 RX ORDER — KETOROLAC TROMETHAMINE 30 MG/ML
INJECTION, SOLUTION INTRAMUSCULAR; INTRAVENOUS
Status: DISPENSED
Start: 2024-12-13 | End: 2024-12-13

## 2024-12-13 RX ORDER — BUPIVACAINE HYDROCHLORIDE 2.5 MG/ML
INJECTION, SOLUTION EPIDURAL; INFILTRATION; INTRACAUDAL
Status: DISCONTINUED
Start: 2024-12-13 | End: 2024-12-13 | Stop reason: WASHOUT

## 2024-12-13 RX ORDER — KETOROLAC TROMETHAMINE 30 MG/ML
INJECTION, SOLUTION INTRAMUSCULAR; INTRAVENOUS
Status: DISCONTINUED | OUTPATIENT
Start: 2024-12-13 | End: 2024-12-13 | Stop reason: HOSPADM

## 2024-12-13 RX ORDER — METOCLOPRAMIDE HYDROCHLORIDE 5 MG/ML
10 INJECTION INTRAMUSCULAR; INTRAVENOUS
Status: DISCONTINUED | OUTPATIENT
Start: 2024-12-13 | End: 2024-12-14 | Stop reason: HOSPADM

## 2024-12-13 RX ORDER — HYDROCODONE BITARTRATE AND ACETAMINOPHEN 5; 325 MG/1; MG/1
1 TABLET ORAL EVERY 4 HOURS PRN
Status: CANCELLED | OUTPATIENT
Start: 2024-12-13

## 2024-12-13 RX ORDER — SODIUM CHLORIDE 9 MG/ML
INJECTION, SOLUTION INTRAVENOUS CONTINUOUS
Status: DISCONTINUED | OUTPATIENT
Start: 2024-12-13 | End: 2024-12-14 | Stop reason: HOSPADM

## 2024-12-13 RX ORDER — SCOLOPAMINE TRANSDERMAL SYSTEM 1 MG/1
1 PATCH, EXTENDED RELEASE TRANSDERMAL ONCE AS NEEDED
Status: DISCONTINUED | OUTPATIENT
Start: 2024-12-13 | End: 2024-12-13 | Stop reason: HOSPADM

## 2024-12-13 RX ORDER — KETAMINE HYDROCHLORIDE 50 MG/ML
INJECTION, SOLUTION INTRAMUSCULAR; INTRAVENOUS
Status: DISCONTINUED | OUTPATIENT
Start: 2024-12-13 | End: 2024-12-13

## 2024-12-13 RX ORDER — OXYCODONE AND ACETAMINOPHEN 5; 325 MG/1; MG/1
1 TABLET ORAL EVERY 4 HOURS PRN
Status: DISCONTINUED | OUTPATIENT
Start: 2024-12-13 | End: 2024-12-13 | Stop reason: HOSPADM

## 2024-12-13 RX ORDER — FAMOTIDINE 20 MG/1
20 TABLET, FILM COATED ORAL 2 TIMES DAILY
Status: DISCONTINUED | OUTPATIENT
Start: 2024-12-13 | End: 2024-12-14 | Stop reason: HOSPADM

## 2024-12-13 RX ORDER — OXYCODONE AND ACETAMINOPHEN 10; 325 MG/1; MG/1
1 TABLET ORAL EVERY 4 HOURS PRN
Status: DISCONTINUED | OUTPATIENT
Start: 2024-12-13 | End: 2024-12-13 | Stop reason: HOSPADM

## 2024-12-13 RX ORDER — HYDROCODONE BITARTRATE AND ACETAMINOPHEN 7.5; 325 MG/1; MG/1
1 TABLET ORAL EVERY 4 HOURS PRN
Qty: 42 TABLET | Refills: 0 | Status: SHIPPED | OUTPATIENT
Start: 2024-12-13 | End: 2024-12-20

## 2024-12-13 RX ORDER — AMOXICILLIN 250 MG
2 CAPSULE ORAL 2 TIMES DAILY
Qty: 56 TABLET | Refills: 0 | Status: SHIPPED | OUTPATIENT
Start: 2024-12-13 | End: 2024-12-27

## 2024-12-13 RX ORDER — METHOCARBAMOL 750 MG/1
750 TABLET, FILM COATED ORAL EVERY 6 HOURS PRN
Qty: 56 TABLET | Refills: 0 | Status: SHIPPED | OUTPATIENT
Start: 2024-12-13 | End: 2024-12-27

## 2024-12-13 RX ORDER — BISACODYL 10 MG/1
10 SUPPOSITORY RECTAL DAILY
Status: DISCONTINUED | OUTPATIENT
Start: 2024-12-16 | End: 2024-12-14 | Stop reason: HOSPADM

## 2024-12-13 RX ORDER — METOPROLOL SUCCINATE 25 MG/1
25 TABLET, EXTENDED RELEASE ORAL 2 TIMES DAILY
Status: DISCONTINUED | OUTPATIENT
Start: 2024-12-13 | End: 2024-12-14 | Stop reason: HOSPADM

## 2024-12-13 RX ORDER — HYDROCHLOROTHIAZIDE 25 MG/1
25 TABLET ORAL DAILY
Status: DISCONTINUED | OUTPATIENT
Start: 2024-12-14 | End: 2024-12-14 | Stop reason: HOSPADM

## 2024-12-13 RX ORDER — METOPROLOL SUCCINATE 25 MG/1
25 TABLET, EXTENDED RELEASE ORAL 2 TIMES DAILY
Status: DISCONTINUED | OUTPATIENT
Start: 2024-12-13 | End: 2024-12-13 | Stop reason: HOSPADM

## 2024-12-13 RX ORDER — ONDANSETRON 4 MG/1
4 TABLET, ORALLY DISINTEGRATING ORAL
Status: COMPLETED | OUTPATIENT
Start: 2024-12-13 | End: 2024-12-13

## 2024-12-13 RX ORDER — TALC
6 POWDER (GRAM) TOPICAL NIGHTLY PRN
Status: DISCONTINUED | OUTPATIENT
Start: 2024-12-13 | End: 2024-12-14 | Stop reason: HOSPADM

## 2024-12-13 RX ORDER — ADHESIVE BANDAGE
30 BANDAGE TOPICAL DAILY PRN
Status: DISCONTINUED | OUTPATIENT
Start: 2024-12-13 | End: 2024-12-13 | Stop reason: HOSPADM

## 2024-12-13 RX ORDER — ACETAMINOPHEN 500 MG
1000 TABLET ORAL
Status: COMPLETED | OUTPATIENT
Start: 2024-12-13 | End: 2024-12-13

## 2024-12-13 RX ORDER — PROPOFOL 10 MG/ML
VIAL (ML) INTRAVENOUS
Status: DISCONTINUED | OUTPATIENT
Start: 2024-12-13 | End: 2024-12-13

## 2024-12-13 RX ORDER — HYDROCODONE BITARTRATE AND ACETAMINOPHEN 7.5; 325 MG/1; MG/1
1 TABLET ORAL EVERY 4 HOURS PRN
Status: DISCONTINUED | OUTPATIENT
Start: 2024-12-13 | End: 2024-12-14 | Stop reason: HOSPADM

## 2024-12-13 RX ORDER — KETOROLAC TROMETHAMINE 10 MG/1
10 TABLET, FILM COATED ORAL EVERY 6 HOURS
Status: CANCELLED | OUTPATIENT
Start: 2024-12-13 | End: 2024-12-18

## 2024-12-13 RX ORDER — HYDROCODONE BITARTRATE AND ACETAMINOPHEN 5; 325 MG/1; MG/1
1 TABLET ORAL EVERY 4 HOURS PRN
Status: DISCONTINUED | OUTPATIENT
Start: 2024-12-13 | End: 2024-12-14 | Stop reason: HOSPADM

## 2024-12-13 RX ORDER — ONDANSETRON HYDROCHLORIDE 2 MG/ML
4 INJECTION, SOLUTION INTRAVENOUS EVERY 6 HOURS PRN
Status: DISCONTINUED | OUTPATIENT
Start: 2024-12-13 | End: 2024-12-14 | Stop reason: HOSPADM

## 2024-12-13 RX ORDER — HYDROCHLOROTHIAZIDE 25 MG/1
25 TABLET ORAL DAILY
Status: DISCONTINUED | OUTPATIENT
Start: 2024-12-14 | End: 2024-12-13 | Stop reason: HOSPADM

## 2024-12-13 RX ORDER — GABAPENTIN 300 MG/1
300 CAPSULE ORAL
Status: COMPLETED | OUTPATIENT
Start: 2024-12-13 | End: 2024-12-13

## 2024-12-13 RX ORDER — AMOXICILLIN 250 MG
2 CAPSULE ORAL 2 TIMES DAILY
Status: DISCONTINUED | OUTPATIENT
Start: 2024-12-13 | End: 2024-12-14 | Stop reason: HOSPADM

## 2024-12-13 RX ORDER — MORPHINE SULFATE 10 MG/ML
INJECTION INTRAMUSCULAR; INTRAVENOUS; SUBCUTANEOUS
Status: DISCONTINUED
Start: 2024-12-13 | End: 2024-12-13 | Stop reason: WASHOUT

## 2024-12-13 RX ORDER — SODIUM CHLORIDE 9 MG/ML
INJECTION, SOLUTION INTRAMUSCULAR; INTRAVENOUS; SUBCUTANEOUS
Status: DISCONTINUED | OUTPATIENT
Start: 2024-12-13 | End: 2024-12-13 | Stop reason: HOSPADM

## 2024-12-13 RX ORDER — BUPIVACAINE HYDROCHLORIDE 7.5 MG/ML
INJECTION, SOLUTION EPIDURAL; RETROBULBAR
Status: COMPLETED | OUTPATIENT
Start: 2024-12-13 | End: 2024-12-13

## 2024-12-13 RX ORDER — ISOSORBIDE MONONITRATE 60 MG/1
60 TABLET, EXTENDED RELEASE ORAL DAILY
Status: DISCONTINUED | OUTPATIENT
Start: 2024-12-14 | End: 2024-12-13 | Stop reason: HOSPADM

## 2024-12-13 RX ORDER — VALSARTAN 160 MG/1
320 TABLET ORAL DAILY
Status: DISCONTINUED | OUTPATIENT
Start: 2024-12-14 | End: 2024-12-14 | Stop reason: HOSPADM

## 2024-12-13 RX ORDER — CALCIUM CHLORIDE INJECTION 100 MG/ML
INJECTION, SOLUTION INTRAVENOUS
Status: DISCONTINUED | OUTPATIENT
Start: 2024-12-13 | End: 2024-12-13

## 2024-12-13 RX ORDER — ONDANSETRON HYDROCHLORIDE 2 MG/ML
INJECTION, SOLUTION INTRAVENOUS
Status: DISCONTINUED | OUTPATIENT
Start: 2024-12-13 | End: 2024-12-13

## 2024-12-13 RX ORDER — SODIUM CHLORIDE, SODIUM GLUCONATE, SODIUM ACETATE, POTASSIUM CHLORIDE AND MAGNESIUM CHLORIDE 30; 37; 368; 526; 502 MG/100ML; MG/100ML; MG/100ML; MG/100ML; MG/100ML
INJECTION, SOLUTION INTRAVENOUS CONTINUOUS
Status: DISCONTINUED | OUTPATIENT
Start: 2024-12-13 | End: 2024-12-13

## 2024-12-13 RX ORDER — AMLODIPINE AND OLMESARTAN MEDOXOMIL 10; 40 MG/1; MG/1
1 TABLET ORAL DAILY
Status: DISCONTINUED | OUTPATIENT
Start: 2024-12-14 | End: 2024-12-13 | Stop reason: CLARIF

## 2024-12-13 RX ORDER — HYDROCODONE BITARTRATE AND ACETAMINOPHEN 10; 325 MG/1; MG/1
1 TABLET ORAL EVERY 4 HOURS PRN
Status: CANCELLED | OUTPATIENT
Start: 2024-12-13

## 2024-12-13 RX ORDER — ROPIVACAINE HYDROCHLORIDE 5 MG/ML
INJECTION, SOLUTION EPIDURAL; INFILTRATION; PERINEURAL
Status: DISCONTINUED | OUTPATIENT
Start: 2024-12-13 | End: 2024-12-13 | Stop reason: HOSPADM

## 2024-12-13 RX ORDER — DOCUSATE SODIUM 100 MG/1
200 CAPSULE, LIQUID FILLED ORAL
Status: DISCONTINUED | OUTPATIENT
Start: 2024-12-14 | End: 2024-12-14 | Stop reason: HOSPADM

## 2024-12-13 RX ORDER — ROPIVACAINE HYDROCHLORIDE 5 MG/ML
INJECTION, SOLUTION EPIDURAL; INFILTRATION; PERINEURAL
Status: DISPENSED
Start: 2024-12-13 | End: 2024-12-13

## 2024-12-13 RX ORDER — EPINEPHRINE 1 MG/ML
INJECTION, SOLUTION, CONCENTRATE INTRAVENOUS
Status: DISPENSED
Start: 2024-12-13 | End: 2024-12-13

## 2024-12-13 RX ORDER — METHOCARBAMOL 750 MG/1
750 TABLET, FILM COATED ORAL EVERY 8 HOURS PRN
Status: DISCONTINUED | OUTPATIENT
Start: 2024-12-13 | End: 2024-12-14 | Stop reason: HOSPADM

## 2024-12-13 RX ORDER — BUPIVACAINE HYDROCHLORIDE 2.5 MG/ML
INJECTION, SOLUTION EPIDURAL; INFILTRATION; INTRACAUDAL
Status: COMPLETED
Start: 2024-12-13 | End: 2024-12-13

## 2024-12-13 RX ORDER — CEFAZOLIN 2 G/1
2 INJECTION, POWDER, FOR SOLUTION INTRAMUSCULAR; INTRAVENOUS
Status: COMPLETED | OUTPATIENT
Start: 2024-12-13 | End: 2024-12-13

## 2024-12-13 RX ORDER — EPINEPHRINE 1 MG/ML
INJECTION, SOLUTION, CONCENTRATE INTRAVENOUS
Status: DISCONTINUED | OUTPATIENT
Start: 2024-12-13 | End: 2024-12-13 | Stop reason: HOSPADM

## 2024-12-13 RX ORDER — BUPIVACAINE HYDROCHLORIDE 2.5 MG/ML
INJECTION, SOLUTION EPIDURAL; INFILTRATION; INTRACAUDAL
Status: DISCONTINUED | OUTPATIENT
Start: 2024-12-13 | End: 2024-12-13

## 2024-12-13 RX ORDER — SODIUM CHLORIDE 0.9 % (FLUSH) 0.9 %
SYRINGE (ML) INJECTION
Status: DISPENSED
Start: 2024-12-13 | End: 2024-12-13

## 2024-12-13 RX ORDER — POLYETHYLENE GLYCOL 3350 17 G/17G
17 POWDER, FOR SOLUTION ORAL NIGHTLY
Status: DISCONTINUED | OUTPATIENT
Start: 2024-12-13 | End: 2024-12-14 | Stop reason: HOSPADM

## 2024-12-13 RX ORDER — TRANEXAMIC ACID 650 MG/1
1950 TABLET ORAL
Status: COMPLETED | OUTPATIENT
Start: 2024-12-13 | End: 2024-12-13

## 2024-12-13 RX ORDER — CEFAZOLIN 2 G/1
2 INJECTION, POWDER, FOR SOLUTION INTRAMUSCULAR; INTRAVENOUS
Status: CANCELLED | OUTPATIENT
Start: 2024-12-13 | End: 2024-12-14

## 2024-12-13 RX ORDER — ALUMINUM HYDROXIDE, MAGNESIUM HYDROXIDE, AND SIMETHICONE 1200; 120; 1200 MG/30ML; MG/30ML; MG/30ML
30 SUSPENSION ORAL EVERY 6 HOURS PRN
Status: DISCONTINUED | OUTPATIENT
Start: 2024-12-13 | End: 2024-12-14 | Stop reason: HOSPADM

## 2024-12-13 RX ORDER — ACETAMINOPHEN 10 MG/ML
1000 INJECTION, SOLUTION INTRAVENOUS ONCE
Status: COMPLETED | OUTPATIENT
Start: 2024-12-13 | End: 2024-12-13

## 2024-12-13 RX ORDER — ISOSORBIDE MONONITRATE 60 MG/1
60 TABLET, EXTENDED RELEASE ORAL DAILY
Status: DISCONTINUED | OUTPATIENT
Start: 2024-12-14 | End: 2024-12-14 | Stop reason: HOSPADM

## 2024-12-13 RX ORDER — PHENYLEPHRINE HCL IN 0.9% NACL 1 MG/10 ML
SYRINGE (ML) INTRAVENOUS
Status: DISCONTINUED | OUTPATIENT
Start: 2024-12-13 | End: 2024-12-13

## 2024-12-13 RX ORDER — VANCOMYCIN HYDROCHLORIDE 1 G/20ML
INJECTION, POWDER, LYOPHILIZED, FOR SOLUTION INTRAVENOUS
Status: DISPENSED
Start: 2024-12-13 | End: 2024-12-13

## 2024-12-13 RX ORDER — OXYCODONE AND ACETAMINOPHEN 7.5; 325 MG/1; MG/1
1 TABLET ORAL EVERY 4 HOURS PRN
Status: DISCONTINUED | OUTPATIENT
Start: 2024-12-13 | End: 2024-12-13 | Stop reason: HOSPADM

## 2024-12-13 RX ORDER — AMLODIPINE BESYLATE 5 MG/1
10 TABLET ORAL DAILY
Status: DISCONTINUED | OUTPATIENT
Start: 2024-12-14 | End: 2024-12-14 | Stop reason: HOSPADM

## 2024-12-13 RX ADMIN — METHOCARBAMOL 750 MG: 750 TABLET ORAL at 03:12

## 2024-12-13 RX ADMIN — Medication 100 MCG: at 07:12

## 2024-12-13 RX ADMIN — Medication 200 MCG: at 09:12

## 2024-12-13 RX ADMIN — BUPIVACAINE HYDROCHLORIDE 30 ML: 2.5 INJECTION, SOLUTION EPIDURAL; INFILTRATION; INTRACAUDAL; PERINEURAL at 09:12

## 2024-12-13 RX ADMIN — Medication 100 MCG: at 08:12

## 2024-12-13 RX ADMIN — Medication 200 MCG: at 08:12

## 2024-12-13 RX ADMIN — GABAPENTIN 300 MG: 300 CAPSULE ORAL at 05:12

## 2024-12-13 RX ADMIN — Medication 200 MCG: at 07:12

## 2024-12-13 RX ADMIN — KETAMINE HYDROCHLORIDE 25 MG: 50 INJECTION INTRAMUSCULAR; INTRAVENOUS at 07:12

## 2024-12-13 RX ADMIN — PROPOFOL 75 MCG/KG/MIN: 10 INJECTION, EMULSION INTRAVENOUS at 07:12

## 2024-12-13 RX ADMIN — HYDROCODONE BITARTRATE AND ACETAMINOPHEN 1 TABLET: 5; 325 TABLET ORAL at 01:12

## 2024-12-13 RX ADMIN — PROPOFOL 50 MG: 10 INJECTION, EMULSION INTRAVENOUS at 07:12

## 2024-12-13 RX ADMIN — METOCLOPRAMIDE 10 MG: 5 INJECTION, SOLUTION INTRAMUSCULAR; INTRAVENOUS at 10:12

## 2024-12-13 RX ADMIN — GABAPENTIN 300 MG: 300 CAPSULE ORAL at 08:12

## 2024-12-13 RX ADMIN — KETAMINE HYDROCHLORIDE 25 MG: 50 INJECTION INTRAMUSCULAR; INTRAVENOUS at 08:12

## 2024-12-13 RX ADMIN — CALCIUM CHLORIDE INJECTION 0.5 G: 100 INJECTION, SOLUTION INTRAVENOUS at 09:12

## 2024-12-13 RX ADMIN — TRANEXAMIC ACID 1950 MG: 650 TABLET ORAL at 05:12

## 2024-12-13 RX ADMIN — ONDANSETRON HYDROCHLORIDE 4 MG: 2 SOLUTION INTRAMUSCULAR; INTRAVENOUS at 07:12

## 2024-12-13 RX ADMIN — BUPIVACAINE HYDROCHLORIDE 1.8 ML: 7.5 INJECTION, SOLUTION EPIDURAL; RETROBULBAR at 07:12

## 2024-12-13 RX ADMIN — SENNOSIDES AND DOCUSATE SODIUM 2 TABLET: 50; 8.6 TABLET ORAL at 08:12

## 2024-12-13 RX ADMIN — HYDROCODONE BITARTRATE AND ACETAMINOPHEN 1 TABLET: 5; 325 TABLET ORAL at 06:12

## 2024-12-13 RX ADMIN — HYDROCODONE BITARTRATE AND ACETAMINOPHEN 1 TABLET: 7.5; 325 TABLET ORAL at 10:12

## 2024-12-13 RX ADMIN — MIDAZOLAM 2 MG: 1 INJECTION INTRAMUSCULAR; INTRAVENOUS at 07:12

## 2024-12-13 RX ADMIN — DEXTROSE MONOHYDRATE 3 G: 5 INJECTION INTRAVENOUS at 08:12

## 2024-12-13 RX ADMIN — FAMOTIDINE 20 MG: 20 TABLET, FILM COATED ORAL at 08:12

## 2024-12-13 RX ADMIN — SODIUM CHLORIDE, SODIUM GLUCONATE, SODIUM ACETATE, POTASSIUM CHLORIDE AND MAGNESIUM CHLORIDE: 526; 502; 368; 37; 30 INJECTION, SOLUTION INTRAVENOUS at 07:12

## 2024-12-13 RX ADMIN — POLYETHYLENE GLYCOL 3350 17 G: 17 POWDER, FOR SOLUTION ORAL at 08:12

## 2024-12-13 RX ADMIN — METOPROLOL SUCCINATE 25 MG: 25 TABLET, EXTENDED RELEASE ORAL at 09:12

## 2024-12-13 RX ADMIN — ACETAMINOPHEN 1000 MG: 500 TABLET ORAL at 05:12

## 2024-12-13 RX ADMIN — METOCLOPRAMIDE 10 MG: 5 INJECTION, SOLUTION INTRAMUSCULAR; INTRAVENOUS at 12:12

## 2024-12-13 RX ADMIN — CEFAZOLIN 2 G: 2 INJECTION, POWDER, FOR SOLUTION INTRAMUSCULAR; INTRAVENOUS at 07:12

## 2024-12-13 RX ADMIN — METHOCARBAMOL 750 MG: 750 TABLET ORAL at 11:12

## 2024-12-13 RX ADMIN — DEXTROSE MONOHYDRATE 3 G: 5 INJECTION INTRAVENOUS at 03:12

## 2024-12-13 RX ADMIN — ONDANSETRON 4 MG: 4 TABLET, ORALLY DISINTEGRATING ORAL at 05:12

## 2024-12-13 RX ADMIN — METOCLOPRAMIDE 10 MG: 5 INJECTION, SOLUTION INTRAMUSCULAR; INTRAVENOUS at 05:12

## 2024-12-13 RX ADMIN — ACETAMINOPHEN 1000 MG: 10 INJECTION INTRAVENOUS at 03:12

## 2024-12-13 NOTE — DISCHARGE INSTRUCTIONS
[facility]  [address]  [place] [address]  Phone [phone]       /      Fax [phone]  SURGEON: Dr. Marroquin    After discharge, all questions or concerns should be handled at your surgeon's office ([phone]). If it is a weekend or after hours, you will get the surgeon on call.     Discharge Medications:    PAIN MANAGEMENT: Next Dose Available   Robaxin/Methocarbamol 750mg (Muscle Relaxer) - Every 6-8 hours AS NEEDED for muscle spasms, thigh pain or additional pain control 12/14/2024 at 4:45pm   Norco 7.5/325mg (Hydrocodone/Acetaminophen) (Pain Med) - every 4-6 hours AS NEEDED for pain 12/14/2024 at 12:45pm   COMPLICATION PREVENTION MEDS: Next Dose DUE   Xarelto 20mg daily, restart home dose for continued blood clot prevention AM on 12/15/2024   Miralax 17gm or Senokot S/Michelle-Colace 8.6/50mg 2 tablets - once or twice a day while on narcotics and muscle relaxers for constipation prevention PM on 12/14/2024   NOZIN NASAL  - twice a day for 2 weeks or until supply runs out, whichever comes first (Infection prevention) PM on 12/14/2024     Total Knee Replacement                                                                                                                                    PAIN MEDICATIONS/PAIN MANAGEMENT: (Use the medication log in your discharge packet to keep track of your medications)  Due to being on Xarelto, NO anti-inflammatories (Ibuprofen, Aleve, Motrin, Naproxen, Mobic/Meloxicam, Toradol/Ketorolac, Celebrex, Diclofenac/Voltaren...etc) for 2 weeks or until the wound is healed.    Norco 7.5/325mg (Hydrocodone/Acetaminophen) (pain pill) - You can take 1 tablet every 4-6 hours for pain. If the pain is mild, take 1/2 of a pill. Once you start taking a half of a pain pill, you can take a Tylenol 325mg with each dose for a little extra pain relief without side effects. Gradually decrease the use as the pain lessens. As you decrease the  Norco, increase the Tylenol.    **NO MORE THAN 3000mg OF TYLENOL IN 24 HOURS**.     Robaxin/Methocarbamol 750mg (muscle relaxer)- you can take every 6-8 hours as needed for muscle spasms, thigh pain and stiffness, additional pain control or breakthrough pain medications. This medication is helpful for pain control while lessening your need for narcotics. Please reduce the use gradually as the pain and spasms lessen. DO NOT TAKE AT THE SAME TIME AS A PAIN PILL. YOU WILL BE BETTER SERVED WITH 2 HOURS BETWEEN PAIN PILL AND MUSCLE RELAXER.     **Other things that help with pain control is WALKING, COMPRESSION WRAP, ICE and ELEVATION!!**    BLOOD CLOT PREVENTION:   Restart Xarelto,  as you were taking Prior to surgery. Start on 12/15/2024.     You need to continuing wearing your compression stockings (KRYSTAL Hose - ThromboEmbolic Disease Prevention Device) for the next 2-6 weeks post-op. It is ok to remove them for hygiene and at bedtime.   Hand wash and Dry. **If the swelling persists in the legs after you stop wearing the KRYSTAL hose, continue to wear them until the swelling decreases.**  REMOVE STOCKINGS AT LEAST DAILY FOR SKIN ASSESSMENT.   Do NOT let the stockings roll down, creating a tourniquet around the back of your knee or ankle. If you need to, leave the excess at the bottom of the stocking.   The best thing you can do to prevent blood clots is to walk around as much as possible, AT LEAST EVERY 1-2 HOURS.       CONSTIPATION PREVENTION:   Miralax or Senokot S/Michelle-Colace and Stool softeners EVERY DAY while on pain meds.  Use other more aggressive over the counter LAXATIVES as needed for constipation (Examples: Milk of Magnesia, Dulcolax tabs or suppository, Magnesium Citrate, Fleet's Enema...etc.)   Drink lots of water.  Increase Fiber in diet.  Increase walking distance each day  DO NOT GO MORE THAN 2 DAYS WITHOUT HAVING A BOWEL MOVEMENT!    ACTIVITY:   Weight bearing precautions as follows:  FULL weight bearing to  operative leg with walker.   DO NOT TAKE YOURSELF OFF OF THE WALKER TOO SOON. ALLOW YOUR OUTPATIENT THERAPIST or SURGEON TO GUIDE YOU.   Range of motion as tolerated. Work on BENDING and STRAIGHTENING your knee. Change positions often throughout the day. DO NOT PUT ANYTHING BEHIND THE KNEE, KEEPING IT IN A BENT POSITION.   Walk around at least every 1-2 hours while awake.   No heavy lifting, pulling, pushing or straining. Take it easy!  Outpatient Physical Therapy - Bring prescription to clinic of choice as soon as possible.      SWELLING PREVENTION AND TREATMENT:  Elevate affected extremity way ABOVE THE LEVEL OF THE HEART to reduce swelling (15-30 minutes at a time, 3 times a day).  Ice the Knee, thigh and lower leg AS MUCH AS POSSIBLE  Compression stockings and ace wrap around the knee and thigh as much as possible. Ok to remove at night for comfort.     WOUND CARE:   Poke hole (Small white bandage) - Dry dressing changes every other day and as needed for soiling for 14 days. Start 12/16/2024. Being on Xarelto, You may need to change the dressing daily if the wound is draining. Switch to every other day as soon as possible. NOTIFY MD OF EXCESSIVE WOUND DRAINAGE.     Operative Knee Incision - Grey Mepilex 7-day bandage- Do NOT REMOVE until change date 12/20/24, unless soiled. NO ointments, creams, lotions or antiseptics on the incision. Once removed on the change date, apply occlusive coverlet dressing (long white bandage) and change every other day and as needed for soiling for the next 7 days or until you see your surgeon.     DO NOT TOUCH INCISION(s). DO NOT WET THE INCISION(s). DO NOT apply any ointments, creams, lotions or antiseptics to the incision(s).   Ace wrap - apply your compression stocking to the lower leg and apply the ace wrap where the stocking stops for extra added compression to the knee and thigh.   May wet incision AFTER 2 weeks- (after you follow-up with your surgeon). Ok to shower before  then if able to keep wound from getting wet (plastic barrier, saran wrap or cling wrap and tape).       URINARY RETENTION:  If you start having difficulty urinating, decrease the use of Pain pills and muscle relaxers and notify your primary care doctor.     PNEUMONIA PREVENTION:  Stay out of bed as much as possible and walk around every 1-2 hours.  Continue breathing exercises (Incentive Spirometry) every 1-2 hours while mobility is limited and while you are on pain pills.    FALL PREVENTION:  Wear sturdy shoes that fit well - Wearing shoes with high heels or slippery soles, or shoes that are too loose, can lead to falls. Walking around in bare feet, or only socks, can also increase your risk of falling.  Use walker as long as your surgeon and therapist recommend it  Use good lighting and  throw rugs, electrical cords, furniture and clutter (anything than can cause you to trip at home.   Non-slip rug in bathroom or shower      INFECTION PREVENTION:  NOZIN ANTISEPTIC NASAL  - twice a day for 2 weeks or until supply runs out, whichever comes first. Shake bottle well, saturate cotton swab with 4 drops of antiseptic solution. Swab right nostril rim 6-8 times clockwise and counterclockwise. Take swab out, apply 2 more drops then swab left nostril rim 6-8 times clockwise and counterclockwise.   Proper handwashing before and after dressing changes. Do not wet the wound. Wound care instructions as written above. NOTIFY MD OF EXCESSIVE WOUND DRAINAGE.  No alcohol, smoking or tobacco products  Pets should not be allowed around the wound or the dressing.   Treat UTI and skin infections as soon as possible.  Pre-medicate with antibiotics prior to dental or surgical procedures.   If you are diabetic, MAINTAIN GOOD BLOOD SUGAR CONTROL (Below 150) DURING YOUR RECOVERY. If you see high numbers, notify your primary care doctor.     Call your SURGEON'S OFFICE ([phone]) if you experience the following signs and  symptoms of infection:   Unusual redness, swelling, excessive, cloudy or foul smelling drainage at the incision site.   Persistent low grade temp OR a temp greater than 102 F, unrelieved by Tylenol  Pain at surgical site, unrelieved by pain meds    Warning signs of a blood clot in your leg: (CALL YOUR SURGEON)  New onset or increasing pain in calf, new onset tenderness or redness above or below the knee or increasing swelling of your calf, ankle, or foot.  Warning signs that a blood clot has traveled to your lungs: (REPORT TO THE ER/CALL 051)  Sudden or increase in Shortness of breath, sudden onset of chest pains, or  Localized chest pain with coughing.       IF ANY ISSUES ARISE AND YOU FEEL THE NEED TO CALL YOUR PRIMARY CARE DOCTOR, PLEASE LET YOUR SURGEON KNOW AS WELL.     For emergencies, please report to OUR ([facility] or [facility] main [place]) Emergency department and tell them to call YOUR SURGEON at [phone].     BEFORE MAKING ANY CHANGES TO THE MEDICAL CARE PLAN OR GOING TO THE EMERGENCY ROOM, PLEASE CONTACT THE SURGEON.      After discharge, all questions or concerns should be handled at your surgeon's office ([phone]). If it is a weekend or after hours, you will get the surgeon on call.     Desirae Flores RN, nurse navigator, available for questions or issues after discharge at [phone].

## 2024-12-13 NOTE — H&P
Admission History & Physical    Subjective:    CC: R knee pain     HPI:  Serge Prado presents today for preoperative evaluation for right total knee arthroplasty with Mando robotic assistance.  He continues to have pain with long standing, walking, bending, climbing.  He states he has difficulty sleeping at night.  He has previously been taking Norco 7.5 from his primary care doctor.  He has recently tried taking just ibuprofen.  He is on Xarelto for his AFib.  He is currently out of work as he does not feel safe to do his job at this time as a .    I reviewed the indications for surgery. The risks and benefits of the proposed and alternative treatments were discussed with the patient. Questions pertinent to the procedure were solicited and answered. Dr. Marroquin was available to answer any questions with instruction to call clinic with any further questions.No assurances were given. Informed consent was obtained. The patient expressed good understanding and wished to proceed with scheduling the procedure.         This patient has  Increased pain with activity Initiation  Interference with normal activities of daily living due to pain  Increased pain with weight bearing activities  Pain with range of motion    This patient has an active or unstable comorbidity that significantly increases the mortality risk and require more than 24 hours of postoperative monitoring.    This comorbidity is: Other: A-fib on xarelto    Patient will be admitted as inpatient status due to: Considering the patient's history of chronic pain and regular opioid usage, patient is at higher risk for inadequate pain control and increased narcotic requirements. Due to a higher risk of over sedation, post-op ileus and altered mental status with increased narcotic consumption, the patient will require closer monitoring in the hospital. Will admit as inpatient, initiate our joint replacement pre-emptive multimodal pain protocol and  adjust pain medications accordingly, monitor respiratory status and bowel function. Plan to discharge patient once pain is adequately controlled, deemed safe and is tolerating medications well.         ROS:   Constitutional: No fever, weakness, or fatigue.   Ear/Nose/Mouth/Throat: No nasal congestion or sore throat.   Respiratory: No shortness of breath or cough.   Cardiovascular: No chest pain, palpitations, or peripheral edema.   Gastrointestinal: No nausea, vomiting, or abdominal pain.   Genitourinary: No dysuria.  Musculoskeletal:  Right knee pain, swelling, loss of motion.    Past Surgical History:   Procedure Laterality Date    ABLATION N/A 12/08/2022    Procedure: ABLATION;  Surgeon: Scout Cary MD;  Location: [facility] CATH LAB;  Service: Cardiology;  Laterality: N/A;  EPS + AF CATH ABLATION W/ KETTY & ANEST.    ARTHROSCOPIC DEBRIDEMENT OF SHOULDER Left 07/07/2023    Procedure: DEBRIDEMENT, SHOULDER, ARTHROSCOPIC;  Surgeon: Louis Marroquin MD;  Location: [facility] OR;  Service: Orthopedics;  Laterality: Left;    ARTHROSCOPIC REPAIR OF ROTATOR CUFF OF SHOULDER Left 07/07/2023    Procedure: REPAIR, ROTATOR CUFF, ARTHROSCOPIC;  Surgeon: Louis Marroquin MD;  Location: [facility] OR;  Service: Orthopedics;  Laterality: Left;    ARTHROSCOPIC TENOTOMY OF BICEPS TENDON Left 07/07/2023    Procedure: TENOTOMY, BICEPS, ARTHROSCOPIC;  Surgeon: Louis Marroquin MD;  Location: [facility] OR;  Service: Orthopedics;  Laterality: Left;    ARTHROSCOPY OF KNEE Right     BACK SURGERY      lower fusion    COLONOSCOPY  07/31/2018    Nitin Batres MD    SHOULDER ARTHROSCOPY Right     KETTY/DCCV  07/2022        Past Medical History:   Diagnosis Date    Arthritis     Atrial fibrillation with RVR     Back pain     GERD (gastroesophageal reflux disease)     Hemorrhoids     Hyperlipidemia     Hypertension         Objective:    Vitals:    12/13/24 0530   BP: (!) 161/79   Pulse: 80   Resp: 20   Temp: 97.7 °F (36.5 °C)        Physical Exam:    Appearance: No  distress, good color on room air. Alert and cooperative.  HEENT: Normocephalic. PERRLA EOM intact.   Lungs: Breathing unlabored.  Heart: Regular rate and rhythm.  Abdomen: Soft, non-tender.  No rebound tenderness.  Extremities: Examination of the right lower extremity compartments are soft and warm. Skin is intact. There are no signs or symptoms of DVT or infection. There is no obvious joint joint effusion. There is no erythema. Tender to palpation along the medial and lateral joint line , right knee range of motion is 0-95. Cellulitis has resolved. No calf tenderness. The knee is stable to exam with varus and valgus stressing. Negative anterior and posterior drawer. Negative Lachman´s. Negative Hever's test. Patella grind is positive, Negative for apprehension   Skin: No rashes or open wounds.        Assessment:  1. Localized osteoarthritis of right knee  - X-Ray Knee Complete 4 Or More Views Right; Future  - CT Knee w/o Contrast Right w/Mando Protocol; Future  - Case Request Operating Room: ROBOTIC ARTHROPLASTY, KNEE, TOTAL  - Vital signs; Standing  - Insert peripheral IV; Standing  - Clip and Prep Other (please specifiy) (Operative site); Standing  - Cleanse with Chlorhexidine (CHG); Standing  - Apply Nozin; Standing  - Diet NPO; Standing  - electrolyte-A infusion  - IP VTE LOW RISK PATIENT; Standing  - ceFAZolin (Ancef) 2 g in D5W 50 mL IVPB  - acetaminophen tablet 1,000 mg  - gabapentin capsule 300 mg  - ondansetron disintegrating tablet 4 mg  - scopolamine 1.3-1.5 mg (1 mg over 3 days) 1 patch  - tranexamic acid (LYSTEDA) tablet 1,950 mg  - POCT glucose; Standing  - CBC auto differential; Future  - Comprehensive metabolic panel; Future  - Urinalysis; Future  - X-Ray Chest PA And Lateral; Future  - EKG 12-lead; Future  - Inpatient consult to Anesthesiology; Standing  - Admit to Inpatient; Standing  - Place KRYSTAL hose; Standing  - Place sequential compression device; Standing  - MRSA PCR; Future  - Hemoglobin  A1C; Future  - MRSA PCR    2. Preop testing  - CT Knee w/o Contrast Right w/Mando Protocol; Future  - Case Request Operating Room: ROBOTIC ARTHROPLASTY, KNEE, TOTAL  - Vital signs; Standing  - Insert peripheral IV; Standing  - Clip and Prep Other (please specifiy) (Operative site); Standing  - Cleanse with Chlorhexidine (CHG); Standing  - Apply Nozin; Standing  - Diet NPO; Standing  - electrolyte-A infusion  - IP VTE LOW RISK PATIENT; Standing  - ceFAZolin (Ancef) 2 g in D5W 50 mL IVPB  - acetaminophen tablet 1,000 mg  - gabapentin capsule 300 mg  - ondansetron disintegrating tablet 4 mg  - scopolamine 1.3-1.5 mg (1 mg over 3 days) 1 patch  - tranexamic acid (LYSTEDA) tablet 1,950 mg  - POCT glucose; Standing  - CBC auto differential; Future  - Comprehensive metabolic panel; Future  - Urinalysis; Future  - X-Ray Chest PA And Lateral; Future  - EKG 12-lead; Future  - Inpatient consult to Anesthesiology; Standing  - Admit to Inpatient; Standing  - Place KRYSTAL hose; Standing  - Place sequential compression device; Standing  - MRSA PCR; Future  - Hemoglobin A1C; Future  - MRSA PCR       Plan:  Plan for Right total knee arthroplasty with Mando robotic assistance at [facility]. The patient has been given preoperative instructions. Post-operative appointment is scheduled for 2 weeks.  Patient was given knee exercises for preop rehab.  Patient will need CT of the operative knee for preoperative surgical planning. We have discussed his work duties; he remains to feel unsafe to do his job at this time. He will remain off duty.

## 2024-12-13 NOTE — ANESTHESIA PREPROCEDURE EVALUATION
12/13/2024  Serge Prado is a 69 y.o., male.    Na 138, K 4.2, Cr 0.93  14 / 40 / 250k  Xarelto last dose 12/10 at 5A  Per CLAUDIA guidelines, xarelto held > 72h prior to neuraxial            TTE 2022      Pre-op Assessment    I have reviewed the Patient Summary Reports.     I have reviewed the Nursing Notes. I have reviewed the NPO Status.   I have reviewed the Medications.     Review of Systems  Anesthesia Hx:  No problems with previous Anesthesia   History of prior surgery of interest to airway management or planning:  Previous anesthesia: General, MAC        Denies Family Hx of Anesthesia complications.    Denies Personal Hx of Anesthesia complications.                    Social:  Non-Smoker, Social Alcohol Use       Hematology/Oncology:  Hematology Normal   Oncology Normal                                   EENT/Dental:  EENT/Dental Normal           Cardiovascular:  Exercise tolerance: good   Hypertension    Dysrhythmias atrial fibrillation         ECG has been reviewed.    Functional Capacity good / => 4 METS                   Hypertension, Essential Hypertension     Disorder of Cardiac Rhythm, Atrial Fibrillation, Hx of Atrial Fibrillation, S/P electrical cardioversion     Pulmonary:  Pulmonary Normal                       Hepatic/GI:     GERD                Musculoskeletal:   Musculoskeletal General/Symptoms: joint pain. Functional capacity is unlimited. Left shoulder rotator cuff tear           Neurological:  Neurology Normal                                      Endocrine:        Obesity / BMI > 30  Dermatological:  Skin Normal    Psych:  Psychiatric Normal                    Physical Exam  General: Well nourished, Cooperative and Alert    Airway:  Mallampati: II   Mouth Opening: Normal  TM Distance: Normal  Tongue: Normal    Dental:  Intact    Chest/Lungs:  Normal Respiratory Rate    Heart:  Rate:  Normal        Anesthesia Plan  Type of Anesthesia, risks & benefits discussed:    Anesthesia Type: Spinal  Intra-op Monitoring Plan: Standard ASA Monitors  Post Op Pain Control Plan: multimodal analgesia and peripheral nerve block  Induction:  IV  Informed Consent: Informed consent signed with the Patient and all parties understand the risks and agree with anesthesia plan.  All questions answered.   ASA Score: 3  Day of Surgery Review of History & Physical: H&P Update referred to the surgeon/provider.  Anesthesia Plan Notes:   Post-op analgesic adductor canal block    Ready For Surgery From Anesthesia Perspective.     .

## 2024-12-13 NOTE — TRANSFER OF CARE
"Anesthesia Transfer of Care Note    Patient: Serge Prado    Procedure(s) Performed: Procedure(s) (LRB):  ROBOTIC ARTHROPLASTY, KNEE, TOTAL (Right)    Patient location: PACU    Anesthesia Type: spinal    Transport from OR: Transported from OR on room air with adequate spontaneous ventilation    Post pain: adequate analgesia    Post assessment: no apparent anesthetic complications    Post vital signs: stable    Level of consciousness: awake    Nausea/Vomiting: no nausea/vomiting    Complications: none    Transfer of care protocol was followed      Last vitals: Visit Vitals  BP (!) 161/79   Pulse 80   Temp 36.5 °C (97.7 °F) (Tympanic)   Resp 20   Ht 5' 9" (1.753 m)   Wt 120.9 kg (266 lb 8.6 oz)   SpO2 100%   BMI 39.36 kg/m²     "

## 2024-12-13 NOTE — ANESTHESIA PROCEDURE NOTES
Spinal    Diagnosis: Osrteoarthritis  Patient location during procedure: OR  Start time: 12/13/2024 7:29 AM  Timeout: 12/13/2024 7:28 AM  End time: 12/13/2024 7:33 AM    Staffing  Authorizing Provider: Charanjit Reddy MD  Performing Provider: Charanjit Reddy MD    Staffing  Performed by: Charanjit Reddy MD  Authorized by: Charanjit Reddy MD    Preanesthetic Checklist  Completed: patient identified, IV checked, site marked, risks and benefits discussed, surgical consent, monitors and equipment checked, pre-op evaluation and timeout performed  Spinal Block  Patient position: sitting  Prep: ChloraPrep  Patient monitoring: heart rate, cardiac monitor, continuous pulse ox, continuous capnometry and frequent blood pressure checks  Approach: midline  Location: L3-4  Injection technique: single shot  CSF Fluid: clear free-flowing CSF  Needle  Needle type: Miguelangel   Needle gauge: 25 G  Needle length: 3.5 in  Additional Documentation: incremental injection, negative aspiration for heme and no paresthesia on injection  Needle localization: anatomical landmarks  Assessment  Sensory level: T6   Dermatomal levels determined by alcohol wipe  Ease of block: easy  Patient's tolerance of the procedure: comfortable throughout block and no complaints  Additional Notes  Straightforward, insertion lateral to scar, likely paramedian approach, some lateral redirection but one level, + csf egress, pre and post aspiration of csf, no paresthesias.    Barry FUNES   Medications:    Medications: bupivacaine (pf) (MARCAINE) injection 0.75% - Intraspinal   1.8 mL - 12/13/2024 7:33:00 AM

## 2024-12-13 NOTE — ANESTHESIA PROCEDURE NOTES
Peripheral Block    Patient location during procedure: pre-op   Block not for primary anesthetic.  Reason for block: at surgeon's request and post-op pain management   Post-op Pain Location: right knee   Start time: 12/13/2024 9:30 AM  Timeout: 12/13/2024 9:30 AM   End time: 12/13/2024 9:35 AM    Staffing  Authorizing Provider: Charanjit Reddy MD  Performing Provider: Charanjit Reddy MD    Staffing  Performed by: Charanjit Reddy MD  Authorized by: Charanjti Reddy MD    Preanesthetic Checklist  Completed: patient identified, IV checked, site marked, risks and benefits discussed, surgical consent, monitors and equipment checked, pre-op evaluation and timeout performed  Peripheral Block  Patient position: supine  Prep: ChloraPrep  Patient monitoring: heart rate, cardiac monitor, continuous pulse ox, continuous capnometry and frequent blood pressure checks  Block type: adductor canal  Laterality: right  Injection technique: single shot  Needle  Needle type: Stimuplex   Needle gauge: 21 G  Needle length: 4 in  Needle localization: anatomical landmarks and ultrasound guidance   -ultrasound image captured on disc.  Assessment  Injection assessment: negative aspiration, negative parasthesia and local visualized surrounding nerve  Paresthesia pain: none  Heart rate change: no  Slow fractionated injection: yes  Pain Tolerance: comfortable throughout block  Medications:    Medications: bupivacaine (pf) (MARCAINE) injection 0.25% - Perineural   30 mL - 12/13/2024 9:35:00 AM    Additional Notes  Good view of SFA at the midpoint of the sartorius muscle, needle guided anteriorly to artery and local dosed with obvious perineural spread in the adductor canal, no paresthesias, no complications noted.  VSS.  [facility] RN monitoring vitals throughout procedure.  Patient tolerated procedure well.

## 2024-12-13 NOTE — ANESTHESIA POSTPROCEDURE EVALUATION
Anesthesia Post Evaluation    Patient: Serge Prado    Procedure(s) Performed: Procedure(s) (LRB):  ROBOTIC ARTHROPLASTY, KNEE, TOTAL (Right)    Final Anesthesia Type: spinal      Patient location during evaluation: floor  Patient participation: Yes- Able to Participate  Level of consciousness: awake and alert  Post-procedure vital signs: reviewed and stable  Pain management: adequate  Airway patency: patent    PONV status at discharge: No PONV  Anesthetic complications: no      Respiratory status: unassisted  Hydration status: euvolemic  Follow-up not needed.              Vitals Value Taken Time   /72 12/13/24 1208   Temp 36.4 °C (97.5 °F) 12/13/24 1100   Pulse 81 12/13/24 1208   Resp 15 12/13/24 1003   SpO2 99 % 12/13/24 1208   Vitals shown include unfiled device data.      Event Time   Out of Recovery 10:08:00         Pain/Eyal Score: Pain Rating Prior to Med Admin: 8 (12/13/2024  5:47 AM)  Eyal Score: 9 (12/13/2024  9:58 AM)

## 2024-12-13 NOTE — PLAN OF CARE
Problem: Physical Therapy  Goal: Physical Therapy Goal  Description: Pt will improve functional independence by performing:    Bed mobility: SBA  Sit to stand: SBA with rolling walker  Bed to chair: SBA with Stand Step  with rolling walker   Car Transfer: SBA with rolling walker  Ambulation x 200'  feet with SBA and rolling walker  1 Step (Curb): Min A  and rolling walker  3 Steps: Min A  and R HR  right knee AROM flexion (in degrees): 90  right knee AROM extension (in degrees): 0   Independent with total knee HEP     Outcome: Progressing

## 2024-12-13 NOTE — PLAN OF CARE
Problem: Adult Inpatient Plan of Care  Goal: Plan of Care Review  Outcome: Progressing  Goal: Patient-Specific Goal (Individualized)  Outcome: Progressing  Goal: Absence of Hospital-Acquired Illness or Injury  Outcome: Progressing  Goal: Optimal Comfort and Wellbeing  Outcome: Progressing  Goal: Readiness for Transition of Care  Outcome: Progressing     Problem: Wound  Goal: Optimal Coping  Outcome: Progressing  Goal: Optimal Functional Ability  Outcome: Progressing  Goal: Absence of Infection Signs and Symptoms  Outcome: Progressing  Goal: Improved Oral Intake  Outcome: Progressing  Goal: Optimal Pain Control and Function  Outcome: Progressing  Goal: Skin Health and Integrity  Outcome: Progressing  Goal: Optimal Wound Healing  Outcome: Progressing     Problem: Infection  Goal: Absence of Infection Signs and Symptoms  Outcome: Progressing     Problem: Knee Arthroplasty  Goal: Optimal Coping  Outcome: Progressing  Goal: Absence of Bleeding  Outcome: Progressing  Goal: Effective Bowel Elimination  Outcome: Progressing  Goal: Fluid and Electrolyte Balance  Outcome: Progressing  Goal: Optimal Functional Ability  Outcome: Progressing  Goal: Absence of Infection Signs and Symptoms  Outcome: Progressing  Goal: Intact Neurovascular Status  Outcome: Progressing  Goal: Anesthesia/Sedation Recovery  Outcome: Progressing  Goal: Optimal Pain Control and Function  Outcome: Progressing  Goal: Nausea and Vomiting Relief  Outcome: Progressing  Goal: Effective Urinary Elimination  Outcome: Progressing  Goal: Effective Oxygenation and Ventilation  Outcome: Progressing     Problem: Comorbidity Management  Goal: Blood Pressure in Desired Range  Outcome: Progressing

## 2024-12-13 NOTE — PLAN OF CARE
12/13/24 1246   Discharge Assessment   Assessment Type Discharge Planning Assessment   Source of Information patient   Communicated BRANT with patient/caregiver Yes   Reason For Admission S/P tkr   People in Home spouse   Do you expect to return to your current living situation? Yes   Do you have help at home or someone to help you manage your care at home? Yes   Who are your caregiver(s) and their phone number(s)? wife- joy [phone] & daughter- Kristin [phone]   Prior to hospitilization cognitive status: Alert/Oriented   Current cognitive status: Alert/Oriented   Walking or Climbing Stairs Difficulty yes   Walking or Climbing Stairs ambulation difficulty, requires equipment   Dressing/Bathing Difficulty yes   Dressing/Bathing bathing difficulty, requires equipment;dressing difficulty, assistance 1 person;dressing difficulty, requires equipment;bathing difficulty, assistance 1 person   Equipment Currently Used at Home none   Readmission within 30 days? No   Patient currently being followed by outpatient case management? No   Do you currently have service(s) that help you manage your care at home? No   Do you take prescription medications? Yes   Do you have prescription coverage? Yes   Do you have any problems affording any of your prescribed medications? No   Is the patient taking medications as prescribed? yes   Who is going to help you get home at discharge? wife   How do you get to doctors appointments? car, drives self   Are you on dialysis? No   Do you take coumadin? No   Discharge Plan A Home with family   Discharge Plan B Home with family   DME Needed Upon Discharge  walker, rolling   Discharge Plan discussed with: Patient   Transition of Care Barriers Mobility     S/p TKR. Spk w pt  -- wife, Joy & daughterKristin to asst w homecare. Pt needs RW. Provider list given. Foc obtained.   Called/ faxed referral for dme to [facility]. They will deliver to hospital.   Called/ faxed referral for outpatient therapy  to [facility] @ . They will contact pt with appointment date & time.   PCP: dR Marcie Prado   Contact # Joy [phone]; Kristin [phone].     Patient DID participate in a pre-op exercise regimen

## 2024-12-13 NOTE — BRIEF OP NOTE
[facility] - Periop Services  Brief Operative Note    SUMMARY     Surgery Date: 12/13/2024     Surgeons and Role:     * Louis Marroquin MD - Primary    Assisting Surgeon: None    Pre-op Diagnosis:  Localized osteoarthritis of right knee [M17.11]  Preop testing [Z01.818]    Post-op Diagnosis:  Post-Op Diagnosis Codes:     * Localized osteoarthritis of right knee [M17.11]     * Preop testing [Z01.818]    Procedure(s) (LRB):  ROBOTIC ARTHROPLASTY, KNEE, TOTAL (Right)    Anesthesia: Spinal    Implants:  Implant Name Type Inv. Item Serial No.  Lot No. LRB No. Used Action   CEMENT BONE ANTIBIO SIMPLEX P - [id number]  CEMENT BONE ANTIBIO SIMPLEX P  [employer].  Right 2 Implanted   COMP FEM CRUCIATE JAZMINE DZ 5 RT - [id number]  COMP FEM CRUCIATE JAZMINE DZ 5 RT  [employer]. [id number] Right 1 Implanted   INSERT TIBIAL SZ 5 9MMX3 - [id number]  INSERT TIBIAL SZ 5 9MMX3  [employer]. [id number] Right 1 Implanted   PRIMARY TIBIAL BASE 5. - [id number]  PRIMARY TIBIAL BASE 5.  [employer]. [id number] Right 1 Implanted       Operative Findings: R TKA    Estimated Blood Loss: * No values recorded between 12/13/2024  7:59 AM and 12/13/2024  9:22 AM *    Estimated Blood Loss has been documented.         Specimens:   Specimen (24h ago, onward)       Start     Ordered    12/13/24 0621  Specimen to Pathology  RELEASE UPON ORDERING        References:    Click here for ordering Quick Tip   Question:  Release to patient  Answer:  Immediate    12/13/24 0621                    [id number]

## 2024-12-13 NOTE — OP NOTE
DATE OF PROCEDURE: 12/13/2024    SURGEON:  Louis Marroquin M.D.    ASSISTANT:GONZALO Trivedi    ASSISTANT ATTESTATION: PA was necessary and essential for all aspects of the operation, including but no limited to patient positioning, surgical exposure, bony preparation, implantation, wound closure, and dressing placement.      Hospital: [facility]     PREOPERATIVE DIAGNOSIS:  Arthritis, Right knee.     POSTOPERATIVE DIAGNOSIS:  Arthritis, Right knee.     PROCEDURES PERFORMED:  Robotically-assisted right total knee arthroplasty.     ANESTHESIA: spinal    IV FLUIDS: Per Anesthesia    ESTIMATED BLOOD LOSS:  <50 cc      COUNTS:  Correct.    COMPLICATIONS:  None.    IMPLANTS:   Implant Name Type Inv. Item Serial No.  Lot No. LRB No. Used Action   CEMENT BONE ANTIBIO SIMPLEX P - [id number]  CEMENT BONE ANTIBIO SIMPLEX P  [employer].  Right 2 Implanted   PIN BONE 3.2H218CG - [id number]  PIN BONE [id number]  [employer].  Right 1 Implanted and Explanted   PIN BONE 4 X 140MM STERILE - [id number]  PIN BONE 4 X 140MM STERILE  [employer]  Right 1 Implanted and Explanted   COMP FEM CRUCIATE JAZMINE DZ 5 RT - [id number]  COMP FEM CRUCIATE JAZMINE DZ 5 RT  [employer]. [id number] Right 1 Implanted   INSERT TIBIAL SZ 5 9MMX3 - [id number]  INSERT TIBIAL SZ 5 9MMX3  [employer]. [id number] Right 1 Implanted   PRIMARY TIBIAL BASE 5. - [id number]  PRIMARY TIBIAL BASE 5.  [employer]. [id number] Right 1 Implanted        CONDITION: Stable to PACU.        INDICATIONS FOR PROCEDURE:    Serge Prado is a 69 y.o. year old male with continued pain and loss of motion of the right knee. The patient has failed conservative treatments and has been followed in my clinic. The risks, benefits, and alternatives were discussed with the patient in detail. All questions were answered. Informed consent was obtained.       PROCEDURE IN DETAIL:  Patient was found in preoperative  holding by Anesthesia and found fit for surgery. The patient was taken to the operating room and placed on the operating table in supine position. All bony prominences were well padded. Timeout was called to identify correct patient, correct procedure, and correct site, and all were in agreement. The patient underwent spinal anesthesia without complications. The patient was then prepped and draped in normal sterile fashion, leaving the right lower extremity exposed for surgery. A well-padded tourniquet was placed on right upper thigh. Approximate tourniquet time was 53 minutes at 250. The patient received preoperative antibiotics.     After exsanguination of right lower extremity, tourniquet was inflated. A 15 cm anterior incision was made over the right knee, soft tissue dissection down to the fascia, where patient had a medial parapatellar arthrotomy. The knee was then flexed and patella was everted. Remaining fat pad and meniscus were removed. The patient had severe bone-on-bone arthritis of the medial compartment and patellofemoral joint. Patient had mild varus deformity.     Next, the 2 femoral and tibial pins were then placed to allow communication with the [employer] robotic machine. The knee was then registered with 40 trigger points both on the femoral and tibial side. Next, patient underwent soft tissue balancing, both mediolaterally in and flexion extension, evenly out to approximately 18 mm. After this was done, the [employer] robotic machine was brought in. The distal femoral cut, anterior, posterior, and chamfer cuts were then made. The proximal tibial cut was then made. Next, trialing with a 5 femur and a 5 tibia demonstrated 9 poly with full extension. The patient was able to gravity flex to 120 degrees of gravity flexion. Patella did not require resurfacing. After this was done, the trialing components were removed. The bone was then copiously irrigated and dried. The actual components were then cemented into  place. The knee was placed in extension. Tourniquet was released. Hemostasis was achieved. Copious irrigation was used to wash the wound. The patient's motion was 120 degrees of flexion. The patient was stable to stressing and patellofemoral tracking was appropriate.     After this was done, copious irrigation was used to wash the wound. The fascia was closed with 0 Ethibond, subcutaneous tissue closed with 2-0 Vicryl. Skin was closed with skin staples. Xeroform, 4 x 4's, soft tissue dressing, Ace wrap was placed over the right lower extremity. The patient was then awoken by Anesthesia and brought to PACU in stable condition.

## 2024-12-13 NOTE — PT/OT/SLP EVAL
Physical Therapy Evaluation    Patient Name:  Serge Prado   MRN:  [id number]    Recommendations:     Discharge Recommendations: Low Intensity Therapy   Discharge Equipment Recommendations: walker, rolling   Barriers to discharge: None    Assessment:     Serge Prado is a 69 y.o. male admitted with a medical diagnosis of Localized osteoarthritis of right knee.  He presents with the following impairments/functional limitations: weakness, impaired endurance, impaired functional mobility, decreased lower extremity function, pain, decreased ROM, edema, orthopedic precautions .    Rehab Prognosis: Good; patient would benefit from acute skilled PT services to address these deficits and reach maximum level of function.    Recent Surgery: Procedure(s) (LRB):  ROBOTIC ARTHROPLASTY, KNEE, TOTAL (Right) Day of Surgery    Plan:     During this hospitalization, patient to be seen BID to address the identified rehab impairments via gait training, therapeutic activities, therapeutic exercises and progress toward the following goals:    Plan of Care Expires:       Subjective     Chief Complaint: R knee pain  Patient/Family Comments/goals:   Pain/Comfort:  Location - Side 1: Right  Location 1: knee  Pain Addressed 1: Pre-medicate for activity, Reposition, Distraction, Cessation of Activity    Patients cultural, spiritual, Buddhism conflicts given the current situation:      Living Environment:  Pt lives in single story home with wife, 3 steps to enter home with R HR  Prior to admission, patients level of function was ind.  Equipment used at home: none.  DME owned (not currently used): none.  Upon discharge, patient will have assistance from wife.    Objective:     Communicated with nurse prior to session.  Patient found supine with peripheral IV, telemetry  upon PT entry to room.    General Precautions: Standard, fall  Orthopedic Precautions:RLE weight bearing as tolerated   Braces:    Respiratory Status: Room  air    Exams:  RLE ROM:     (In degrees) AROM PROM   R knee flexion 75 80   R knee extension 0       RLE Strength: NT dt sx side  LLE ROM: WFL  LLE Strength: WFL    Functional Mobility:  Bed Mobility:     Supine to Sit: stand by assistance  Transfers:     Sit to Stand:  contact guard assistance with rolling walker  Gait: Pt ambulated 120 ft w rw and CGA, using step through gait pattern at slow pace.          Treatment & Education:  Pt edu on total knee protocol and importance of frequent mobility  Pt completed prehab prior to sx    Patient left up in chair with all lines intact, call button in reach, nurse notified, and wife present.    GOALS:   Multidisciplinary Problems       Physical Therapy Goals          Problem: Physical Therapy    Goal Priority Disciplines Outcome Interventions   Physical Therapy Goal     PT, PT/OT Progressing    Description: Pt will improve functional independence by performing:    Bed mobility: SBA  Sit to stand: SBA with rolling walker  Bed to chair: SBA with Stand Step  with rolling walker   Car Transfer: SBA with rolling walker  Ambulation x 200'  feet with SBA and rolling walker  1 Step (Curb): Min A  and rolling walker  3 Steps: Min A  and R HR  right knee AROM flexion (in degrees): 90  right knee AROM extension (in degrees): 0   Independent with total knee HEP                          History:     Past Medical History:   Diagnosis Date    Arthritis     Atrial fibrillation with RVR     Back pain     GERD (gastroesophageal reflux disease)     Hemorrhoids     Hyperlipidemia     Hypertension        Past Surgical History:   Procedure Laterality Date    ABLATION N/A 12/08/2022    Procedure: ABLATION;  Surgeon: Scout Cary MD;  Location: [facility] CATH LAB;  Service: Cardiology;  Laterality: N/A;  EPS + AF CATH ABLATION W/ KETTY & ANEST.    ARTHROSCOPIC DEBRIDEMENT OF SHOULDER Left 07/07/2023    Procedure: DEBRIDEMENT, SHOULDER, ARTHROSCOPIC;  Surgeon: Louis Marroquin MD;  Location: [facility] OR;   Service: Orthopedics;  Laterality: Left;    ARTHROSCOPIC REPAIR OF ROTATOR CUFF OF SHOULDER Left 07/07/2023    Procedure: REPAIR, ROTATOR CUFF, ARTHROSCOPIC;  Surgeon: Louis Marroquin MD;  Location: [facility] OR;  Service: Orthopedics;  Laterality: Left;    ARTHROSCOPIC TENOTOMY OF BICEPS TENDON Left 07/07/2023    Procedure: TENOTOMY, BICEPS, ARTHROSCOPIC;  Surgeon: Louis Marroquin MD;  Location: [facility] OR;  Service: Orthopedics;  Laterality: Left;    ARTHROSCOPY OF KNEE Right     BACK SURGERY      lower fusion    COLONOSCOPY  07/31/2018    Nitin Batres MD    SHOULDER ARTHROSCOPY Right     KETTY/DCCV  07/2022       Time Tracking:     PT Received On:    PT Start Time: 1300     PT Stop Time: 1320  PT Total Time (min): 20 min     Billable Minutes: Evaluation 20 12/13/2024

## 2024-12-14 ENCOUNTER — PATIENT MESSAGE (OUTPATIENT)
Dept: ADMINISTRATIVE | Facility: OTHER | Age: 69
End: 2024-12-14
Payer: COMMERCIAL

## 2024-12-14 VITALS
DIASTOLIC BLOOD PRESSURE: 62 MMHG | TEMPERATURE: 99 F | HEIGHT: 69 IN | BODY MASS INDEX: 39.48 KG/M2 | SYSTOLIC BLOOD PRESSURE: 103 MMHG | RESPIRATION RATE: 16 BRPM | HEART RATE: 89 BPM | WEIGHT: 266.56 LBS | OXYGEN SATURATION: 96 %

## 2024-12-14 LAB
ANION GAP SERPL CALC-SCNC: 10 MEQ/L
BUN SERPL-MCNC: 13 MG/DL (ref 8.4–25.7)
CALCIUM SERPL-MCNC: 9 MG/DL (ref 8.8–10)
CHLORIDE SERPL-SCNC: 102 MMOL/L (ref 98–107)
CO2 SERPL-SCNC: 23 MMOL/L (ref 23–31)
CREAT SERPL-MCNC: 0.88 MG/DL (ref 0.72–1.25)
CREAT/UREA NIT SERPL: 15
ERYTHROCYTE [DISTWIDTH] IN BLOOD BY AUTOMATED COUNT: 12.1 % (ref 11.5–17)
GFR SERPLBLD CREATININE-BSD FMLA CKD-EPI: >60 ML/MIN/1.73/M2
GLUCOSE SERPL-MCNC: 115 MG/DL (ref 82–115)
HCT VFR BLD AUTO: 34.2 % (ref 42–52)
HGB BLD-MCNC: 11.8 G/DL (ref 14–18)
MCH RBC QN AUTO: 34 PG (ref 27–31)
MCHC RBC AUTO-ENTMCNC: 34.5 G/DL (ref 33–36)
MCV RBC AUTO: 98.6 FL (ref 80–94)
NRBC BLD AUTO-RTO: 0 %
PLATELET # BLD AUTO: 219 X10(3)/MCL (ref 130–400)
PMV BLD AUTO: 10.1 FL (ref 7.4–10.4)
POTASSIUM SERPL-SCNC: 4.1 MMOL/L (ref 3.5–5.1)
RBC # BLD AUTO: 3.47 X10(6)/MCL (ref 4.7–6.1)
SODIUM SERPL-SCNC: 135 MMOL/L (ref 136–145)
WBC # BLD AUTO: 8.05 X10(3)/MCL (ref 4.5–11.5)

## 2024-12-14 PROCEDURE — 94761 N-INVAS EAR/PLS OXIMETRY MLT: CPT

## 2024-12-14 PROCEDURE — 25000003 PHARM REV CODE 250: Performed by: NURSE PRACTITIONER

## 2024-12-14 PROCEDURE — 36415 COLL VENOUS BLD VENIPUNCTURE: CPT

## 2024-12-14 PROCEDURE — 99900031 HC PATIENT EDUCATION (STAT)

## 2024-12-14 PROCEDURE — 25000003 PHARM REV CODE 250

## 2024-12-14 PROCEDURE — 94799 UNLISTED PULMONARY SVC/PX: CPT

## 2024-12-14 PROCEDURE — 80048 BASIC METABOLIC PNL TOTAL CA: CPT

## 2024-12-14 PROCEDURE — 97530 THERAPEUTIC ACTIVITIES: CPT | Mod: CQ

## 2024-12-14 PROCEDURE — 63600175 PHARM REV CODE 636 W HCPCS

## 2024-12-14 PROCEDURE — 85027 COMPLETE CBC AUTOMATED: CPT

## 2024-12-14 PROCEDURE — 97116 GAIT TRAINING THERAPY: CPT | Mod: CQ

## 2024-12-14 RX ADMIN — HYDROCHLOROTHIAZIDE 25 MG: 25 TABLET ORAL at 08:12

## 2024-12-14 RX ADMIN — ISOSORBIDE MONONITRATE 60 MG: 60 TABLET, EXTENDED RELEASE ORAL at 08:12

## 2024-12-14 RX ADMIN — HYDROCODONE BITARTRATE AND ACETAMINOPHEN 1 TABLET: 7.5; 325 TABLET ORAL at 04:12

## 2024-12-14 RX ADMIN — METHOCARBAMOL 750 MG: 750 TABLET ORAL at 10:12

## 2024-12-14 RX ADMIN — DOCUSATE SODIUM 200 MG: 100 CAPSULE, LIQUID FILLED ORAL at 05:12

## 2024-12-14 RX ADMIN — DEXTROSE MONOHYDRATE 3 G: 5 INJECTION INTRAVENOUS at 03:12

## 2024-12-14 RX ADMIN — SENNOSIDES AND DOCUSATE SODIUM 2 TABLET: 50; 8.6 TABLET ORAL at 08:12

## 2024-12-14 RX ADMIN — FAMOTIDINE 20 MG: 20 TABLET, FILM COATED ORAL at 08:12

## 2024-12-14 RX ADMIN — METOCLOPRAMIDE 10 MG: 5 INJECTION, SOLUTION INTRAMUSCULAR; INTRAVENOUS at 05:12

## 2024-12-14 RX ADMIN — VALSARTAN 320 MG: 160 TABLET ORAL at 08:12

## 2024-12-14 RX ADMIN — METOPROLOL SUCCINATE 25 MG: 25 TABLET, EXTENDED RELEASE ORAL at 08:12

## 2024-12-14 RX ADMIN — RIVAROXABAN 20 MG: 10 TABLET, FILM COATED ORAL at 08:12

## 2024-12-14 RX ADMIN — HYDROCODONE BITARTRATE AND ACETAMINOPHEN 1 TABLET: 7.5; 325 TABLET ORAL at 08:12

## 2024-12-14 RX ADMIN — AMLODIPINE BESYLATE 10 MG: 5 TABLET ORAL at 08:12

## 2024-12-14 NOTE — PLAN OF CARE
Problem: Adult Inpatient Plan of Care  Goal: Plan of Care Review  Outcome: Progressing  Goal: Patient-Specific Goal (Individualized)  Outcome: Progressing  Goal: Absence of Hospital-Acquired Illness or Injury  Outcome: Progressing  Goal: Optimal Comfort and Wellbeing  Outcome: Progressing  Goal: Readiness for Transition of Care  Outcome: Progressing     Problem: Knee Arthroplasty  Goal: Optimal Coping  Outcome: Progressing  Goal: Absence of Bleeding  Outcome: Progressing  Goal: Effective Bowel Elimination  Outcome: Progressing  Goal: Fluid and Electrolyte Balance  Outcome: Progressing  Goal: Optimal Functional Ability  Outcome: Progressing  Goal: Absence of Infection Signs and Symptoms  Outcome: Progressing  Goal: Intact Neurovascular Status  Outcome: Progressing  Goal: Anesthesia/Sedation Recovery  Outcome: Progressing  Goal: Optimal Pain Control and Function  Outcome: Progressing  Goal: Nausea and Vomiting Relief  Outcome: Progressing  Goal: Effective Urinary Elimination  Outcome: Progressing  Goal: Effective Oxygenation and Ventilation  Outcome: Progressing

## 2024-12-14 NOTE — PT/OT/SLP PROGRESS
Physical Therapy Treatment    Patient Name:  Serge Prado   MRN:  [id number]    Recommendations:     Discharge Recommendations: Low Intensity Therapy  Discharge Equipment Recommendations: walker, rolling  Barriers to discharge: None    Assessment:     Serge Prado is a 69 y.o. male admitted with a medical diagnosis of Localized osteoarthritis of right knee.  He presents with the following impairments/functional limitations: weakness, impaired endurance, impaired functional mobility, decreased lower extremity function, pain, decreased ROM, edema, orthopedic precautions .    Rehab Prognosis: Good; patient would benefit from acute skilled PT services to address these deficits and reach maximum level of function.    Recent Surgery: Procedure(s) (LRB):  ROBOTIC ARTHROPLASTY, KNEE, TOTAL (Right) 1 Day Post-Op    Plan:     During this hospitalization, patient to be seen BID to address the identified rehab impairments via gait training, therapeutic activities, therapeutic exercises and progress toward the following goals:    Plan of Care Expires:       Subjective     Chief Complaint: R knee pain  Patient/Family Comments/goals: to go home  Pain/Comfort:  Pain Rating 1: 7/10  Location - Side 1: Right  Location 1: knee  Pain Addressed 1: Pre-medicate for activity, Reposition, Cessation of Activity      Objective:     Communicated with NSG prior to session.  Patient found up in chair with peripheral IV upon PT entry to room.     General Precautions: Standard, fall  Orthopedic Precautions: RLE weight bearing as tolerated  Braces:    Respiratory Status: Room air     Functional Mobility:  Transfers:     Sit to Stand:  modified independence with rolling walker  Car Transfer: supervision with  rolling walker  using  Stand Pivot  Gait: 200 ft with RW with SBA  Stairs:  Pt ascended/descended 3 stair(s) with No Assistive Device with right handrail with Stand-by Assistance. BUE support with step-to pattern    RLE ROM:      (In  degrees) AROM PROM   R knee flexion 83 87   R knee extension 5  0     Treatment & Education:  LAQ and heel slides x 10 using sheet PRN    Patient left up in chair with all lines intact, call button in reach, and wife present..    GOALS:   Multidisciplinary Problems       Physical Therapy Goals          Problem: Physical Therapy    Goal Priority Disciplines Outcome Interventions   Physical Therapy Goal     PT, PT/OT Progressing    Description: Pt will improve functional independence by performing:    Bed mobility: SBA  Sit to stand: SBA with rolling walker  Bed to chair: SBA with Stand Step  with rolling walker   Car Transfer: SBA with rolling walker  Ambulation x 200'  feet with SBA and rolling walker  1 Step (Curb): Min A  and rolling walker  3 Steps: Min A  and R HR  right knee AROM flexion (in degrees): 90  right knee AROM extension (in degrees): 0   Independent with total knee HEP                          Time Tracking:     PT Received On:    PT Start Time: 0950     PT Stop Time: 1030  PT Total Time (min): 40 min     Billable Minutes: Gait Training 25 and Therapeutic Activity 15    Treatment Type: Treatment  PT/PTA: PTA     Number of PTA visits since last PT visit: 0     12/14/2024

## 2024-12-14 NOTE — PROGRESS NOTES
Daily Orthopaedic Progress Note    PATIENT INFORMATION:  Serge Prado is a 69 y.o. male admitted on 12/13/2024  Hospital Day: 1                    1 Day Post-Op      SUBJECTIVE:  The patient was seen and examined this morning at the bedside. Patient reports no acute issues overnight and adequate control of pain on current regimen.  Patient worked with physical therapy over the last 24 hours.      PHYSICAL EXAM:  General: Well-developed, well-nourished.  Neuro: Alert and oriented x 3.  Psych: Normal mood and affect.  Dressings clean and intact, Operative limb neurovascularly intact distally and sensation intact to light touch distally, no calf tenderness    Vitals:    12/13/24 2252 12/14/24 0344 12/14/24 0401 12/14/24 0730   BP:  109/61  126/74   Patient Position:       Pulse:  80  87   Resp: 18  16    Temp:  97.6 °F (36.4 °C)  98.7 °F (37.1 °C)   TempSrc:  Oral  Oral   SpO2:  99%  98%   Weight:       Height:         I/O last 3 completed shifts:  In: 1940 [P.O.:940; IV Piggyback:1000]  Out: 2 [Urine:2]    MEDICATIONS:  Scheduled Meds:   amLODIPine  10 mg Oral Daily    And    valsartan  320 mg Oral Daily    [START ON 12/16/2024] bisacodyL  10 mg Rectal Daily    docusate sodium  200 mg Oral Before breakfast    famotidine  20 mg Oral BID    gabapentin  300 mg Oral QHS    hydroCHLOROthiazide  25 mg Oral Daily    isosorbide mononitrate  60 mg Oral Daily    metoclopramide  10 mg Intravenous Q6H    metoprolol succinate  25 mg Oral BID    polyethylene glycol  17 g Oral QHS    rivaroxaban  20 mg Oral Daily    senna-docusate 8.6-50 mg  2 tablet Oral BID     Continuous Infusions:   0.9% NaCl   Intravenous Continuous         PRN Meds:.  Current Facility-Administered Medications:     aluminum-magnesium hydroxide-simethicone, 30 mL, Oral, Q6H PRN    HYDROcodone-acetaminophen, 1 tablet, Oral, Q4H PRN    HYDROcodone-acetaminophen, 1 tablet, Oral, Q4H PRN    melatonin, 6 mg, Oral, Nightly PRN    methocarbamoL, 750 mg, Oral, Q8H  PRN    ondansetron, 4 mg, Intravenous, Q6H PRN    LABS:  Recent Lab Results         12/14/24  0614   12/13/24  0927        Anion Gap 10.0         BUN 13.0         BUN/CREAT RATIO 15         Calcium 9.0         Chloride 102         CO2 23         Creatinine 0.88         eGFR >60         Glucose 115         Hematocrit 34.2   35.7       Hemoglobin 11.8   12.4       MCH 34.0         MCHC 34.5         MCV 98.6         MPV 10.1         nRBC 0.0         Platelet Count 219         Potassium 4.1         RBC 3.47         RDW 12.1         Sodium 135         WBC 8.05                 DIAGNOSTICS:  X-Ray Knee 1 or 2 View Right  Narrative: EXAMINATION:  XR KNEE 1 OR 2 VIEW RIGHT    CLINICAL HISTORY:  s/p R TKA;    COMPARISON:  None.    FINDINGS:  Knee prosthesis identified in good anatomic alignment and position    Joint spaces preserved.    No blastic or lytic lesions.    Soft tissues within normal limits.  Impression: Knee prostheses.    Electronically signed by: Beto Martell  Date:    12/13/2024  Time:    09:46       A/P: 69 y.o. male 1 Day Post-Op s/p right total knee arthroplasty  -Continue with current pain control regimen  -Continue with current physical therapy and full weightbearing to operative extremity  -Continue with DVT prophylaxis  -Disposition pending    Magdy Ortiz MD  Orthopedics and Sports Medicine

## 2024-12-14 NOTE — NURSING
Pt discharged home with wife, received discharge instructions along with prescriptions and PT orders, pt had RW delivered on 12/13/24 from [facility], extra dressings given, no distress noted upon discharge.     12/14/2024   12:03 PM    Nurse Note:       Prior to discharge, the Patient/Support Person was educated and was able to verbalize understanding on following:    [x] Yes   [] No   [] Further Education Provided    Knee Replacement Specific Education   Pain management, Medication Management and Prescriptions  Activity level  Orthopedic precautions/braces - N/A  Complication Prevention to include: Constipation, post-op urinary retention, pneumonia, bleeding, falls, infection, DVT prophylaxis and nausea vomiting  Wound care Instructions/Bandages provided  Next dose due for pain and complication prevention medications      Perception of Care - Joint Replacement Population Total Joint Surgery List: KNEE    Patient able to verbalize one way to treat/prevent SWELLING at home   [x] Yes   [] No   [] Further Education Provided      Attending Nurse:  Juan

## 2024-12-16 ENCOUNTER — PATIENT OUTREACH (OUTPATIENT)
Dept: ADMINISTRATIVE | Facility: CLINIC | Age: 69
End: 2024-12-16
Payer: COMMERCIAL

## 2024-12-16 ENCOUNTER — PATIENT MESSAGE (OUTPATIENT)
Dept: ADMINISTRATIVE | Facility: OTHER | Age: 69
End: 2024-12-16
Payer: COMMERCIAL

## 2024-12-16 NOTE — DISCHARGE SUMMARY
ADMIT DATE: 12/13/2024    DISCHARGE DATE: 12/14/2024 11:55 AM     DIAGNOSIS:  End-stage osteoarthritis, right knee.      OPERATIVE PROCEDURE:  Right total knee arthroplasty.      HOSPITAL COURSE: Serge Prado is a 69 y.o.  old male  who was admitted on the above date for the above procedure.  The patient tolerated surgery well, was back up to the orthopedic floor in stable condition.  Postoperatively, the patient is eating well, having normal bowel movements.  Pain is well controlled with p.o. pain medicine.  The patient has been followed by physical therapy, goals met.      PHYSICAL EXAM:  GENERAL:  The patient is a well nourished, well developed male.  The patient is awake, alert and oriented x3 and in no apparent distress.  The patient is pleasant and cooperative.   EXTREMITIES:  Examination of the right lower extremity compartments soft and warm.  Skin is intact.  No signs or symptoms of DVT or infection.  The incision is clean, dry, and intact.  The patient's motion is 0-100 degrees.  Stable to stressing and neurovascularly intact distally.      DISPOSITION:  Home with outpatient physical therapy.      FOLLOWUP:     Follow-up Information       Louis Marroquin MD. Go on 12/30/2024.    Specialty: Orthopedic Surgery  Why: Ortho follow up appt on Monday 12/30/24 @ 1:00pm eliel/ Adry (Dr Marroquin's Phy Assistant) at the [facility].  Contact information:  [address] [place] [address]  [phone]               Equipment, [employer] Follow up.    Why: This is the equipment company. Call if you have questions or concerns.  Contact information:  [address] [facility] [address] [place] [address]  [phone]               [facility] Follow up.    Specialty: Physical Therapy  Why: This is the outpatient therapy facility. They will contact pt with appt date & time. Call if you have questions or concerns.  Contact information:  [address]  [address]  [facility] [place]  [address]  [phone]                                 MEDICATIONS:    Reconciled Home Medications:      Medication List        START taking these medications      HYDROcodone-acetaminophen 7.5-325 mg per tablet  Commonly known as: NORCO  Take 1 tablet by mouth every 4 (four) hours as needed for Pain.     methocarbamoL 750 MG Tab  Commonly known as: ROBAXIN  Take 1 tablet (750 mg total) by mouth every 6 (six) hours as needed (muscle spasms).     senna-docusate 8.6-50 mg 8.6-50 mg per tablet  Commonly known as: PERICOLACE  Take 2 tablets by mouth 2 (two) times a day. for 14 days            CONTINUE taking these medications      amlodipine-olmesartan 10-40 mg per tablet  Commonly known as: ANGI  Take 1 tablet by mouth once daily.     CENTRUM SILVER ORAL  Take 1 tablet by mouth once daily.     cholecalciferol (vitamin D3) 25 mcg (1,000 unit) capsule  Commonly known as: VITAMIN D3  Take 2,000 Units by mouth once daily.     hydroCHLOROthiazide 25 MG tablet  Commonly known as: HYDRODIURIL  Take 0.5 tablets (12.5 mg total) by mouth once daily.     isosorbide mononitrate 60 MG 24 hr tablet  Commonly known as: IMDUR  Take 60 mg by mouth once daily.     metoprolol succinate 25 MG 24 hr tablet  Commonly known as: TOPROL-XL  Take 1 tablet (25 mg total) by mouth 2 (two) times daily.     pantoprazole 40 MG tablet  Commonly known as: PROTONIX  Take 1 tablet (40 mg total) by mouth once daily.     rivaroxaban 20 mg Tab  Commonly known as: XARELTO  Take 20 mg by mouth Daily.     rosuvastatin 10 MG tablet  Commonly known as: CRESTOR  Take 10 mg by mouth once daily.                 DISCHARGE INSTRUCTIONS:  Patient instructed to remain weightbearing as tolerated to the right lower extremity.  Call or return to the emergency room immediately if any worsening conditions.  Patient voiced understanding.

## 2024-12-16 NOTE — PROGRESS NOTES
C3 nurse spoke with Serge Prado for a TCC post hospital discharge follow up call. The patient has a scheduled [facility] appointment with Louis Marroquin MD (Orthopedic Surgery) on 12/30/2024 @1pm.

## 2024-12-17 ENCOUNTER — PATIENT MESSAGE (OUTPATIENT)
Dept: ADMINISTRATIVE | Facility: OTHER | Age: 69
End: 2024-12-17
Payer: COMMERCIAL

## 2024-12-17 ENCOUNTER — TELEPHONE (OUTPATIENT)
Dept: ORTHOPEDICS | Facility: CLINIC | Age: 69
End: 2024-12-17
Payer: COMMERCIAL

## 2024-12-17 NOTE — TELEPHONE ENCOUNTER
Ortho post op follow up call completed. (R TKA) Pt answered YES to pain, redness, warmth, swelling in calves and pain getting worse over time. Pt states he had his 1st outpatient therapy session yesterday at [facility] and the therapist explained pain and swelling were to be expected. Pt states pain is not getting worse over time and he mis understood the question. Pt states he is wearing KRYSTAL hose and ace bandage daily, using ice for swelling and elevating his leg. Reminded to elevate operative leg higher than heart level several times daily and post op follow up is 12-30 at 1pm with Adry. Encouraged to call with any questions or concerns, verbalized understanding.

## 2024-12-18 ENCOUNTER — PATIENT MESSAGE (OUTPATIENT)
Dept: ADMINISTRATIVE | Facility: OTHER | Age: 69
End: 2024-12-18
Payer: COMMERCIAL

## 2024-12-18 ENCOUNTER — TELEPHONE (OUTPATIENT)
Dept: ORTHOPEDICS | Facility: CLINIC | Age: 69
End: 2024-12-18
Payer: COMMERCIAL

## 2024-12-18 LAB — VIEW PATHOLOGY REPORT (RELIAPATH): NORMAL

## 2024-12-18 NOTE — TELEPHONE ENCOUNTER
Patient called stating that he needed [employer] paperwork filled out.     Called patient - he stated that his wife dropped off the papers to our [place] office. I advised that both providers were out this week, but Dr. Marroquin would be back on Monday.     Pt verbalized understanding and will call with any questions or concerns.

## 2024-12-20 ENCOUNTER — PATIENT MESSAGE (OUTPATIENT)
Dept: ADMINISTRATIVE | Facility: OTHER | Age: 69
End: 2024-12-20
Payer: COMMERCIAL

## 2024-12-23 ENCOUNTER — TELEPHONE (OUTPATIENT)
Dept: ORTHOPEDICS | Facility: CLINIC | Age: 69
End: 2024-12-23
Payer: COMMERCIAL

## 2024-12-23 DIAGNOSIS — M17.11 LOCALIZED OSTEOARTHRITIS OF RIGHT KNEE: ICD-10-CM

## 2024-12-23 RX ORDER — HYDROCODONE BITARTRATE AND ACETAMINOPHEN 7.5; 325 MG/1; MG/1
1 TABLET ORAL EVERY 4 HOURS PRN
Qty: 42 TABLET | Refills: 0 | Status: SHIPPED | OUTPATIENT
Start: 2024-12-23 | End: 2024-12-23 | Stop reason: SDUPTHER

## 2024-12-23 RX ORDER — HYDROCODONE BITARTRATE AND ACETAMINOPHEN 7.5; 325 MG/1; MG/1
1 TABLET ORAL EVERY 4 HOURS PRN
Qty: 42 TABLET | Refills: 0 | Status: SHIPPED | OUTPATIENT
Start: 2024-12-23 | End: 2024-12-30

## 2024-12-23 NOTE — TELEPHONE ENCOUNTER
Patients wife Joy left VM after hrs requesting a call back. Attempted to return call @ [phone], left VM advising I will be in a class this morning but she can leave another VM and I will contact after class.     Update:  Wife left another VM asking for return call regarding pain meds.  Called back, spoke to Joy who states pt is almost out of Norco 7.5 mg and wants refill called in to [facility] in [place]. Message sent to Dr Marroquin and staff requesting refill.

## 2024-12-23 NOTE — TELEPHONE ENCOUNTER
----- Message from  Desirae sent at 12/23/2024 11:03 AM CST -----  Regarding: Norco Refill  Patient had R TKA on 12-13 and is almost out of [place] 7.5. His wife called for a refill to be sent to [facility] in [place] as [facility] does not have in stock. Please let me know if you need any additional information. Thank you

## 2024-12-26 ENCOUNTER — PATIENT MESSAGE (OUTPATIENT)
Dept: ADMINISTRATIVE | Facility: OTHER | Age: 69
End: 2024-12-26
Payer: COMMERCIAL

## 2024-12-30 ENCOUNTER — OFFICE VISIT (OUTPATIENT)
Dept: ORTHOPEDICS | Facility: CLINIC | Age: 69
End: 2024-12-30
Payer: COMMERCIAL

## 2024-12-30 VITALS — HEIGHT: 69 IN | BODY MASS INDEX: 39.48 KG/M2 | WEIGHT: 266.56 LBS

## 2024-12-30 DIAGNOSIS — Z96.651 STATUS POST TOTAL RIGHT KNEE REPLACEMENT: Primary | ICD-10-CM

## 2024-12-30 DIAGNOSIS — M17.11 LOCALIZED OSTEOARTHRITIS OF RIGHT KNEE: ICD-10-CM

## 2024-12-30 PROCEDURE — 1101F PT FALLS ASSESS-DOCD LE1/YR: CPT | Mod: CPTII,,,

## 2024-12-30 PROCEDURE — 1159F MED LIST DOCD IN RCRD: CPT | Mod: CPTII,,,

## 2024-12-30 PROCEDURE — 3044F HG A1C LEVEL LT 7.0%: CPT | Mod: CPTII,,,

## 2024-12-30 PROCEDURE — 3288F FALL RISK ASSESSMENT DOCD: CPT | Mod: CPTII,,,

## 2024-12-30 PROCEDURE — 99024 POSTOP FOLLOW-UP VISIT: CPT | Mod: ,,,

## 2024-12-30 PROCEDURE — 4010F ACE/ARB THERAPY RXD/TAKEN: CPT | Mod: CPTII,,,

## 2024-12-30 PROCEDURE — 1160F RVW MEDS BY RX/DR IN RCRD: CPT | Mod: CPTII,,,

## 2024-12-30 RX ORDER — METHOCARBAMOL 750 MG/1
750 TABLET, FILM COATED ORAL EVERY 6 HOURS PRN
Qty: 28 TABLET | Refills: 0 | Status: SHIPPED | OUTPATIENT
Start: 2024-12-30 | End: 2025-01-06

## 2024-12-30 RX ORDER — HYDROCODONE BITARTRATE AND ACETAMINOPHEN 10; 325 MG/1; MG/1
1 TABLET ORAL EVERY 6 HOURS PRN
Qty: 28 TABLET | Refills: 0 | Status: SHIPPED | OUTPATIENT
Start: 2024-12-30 | End: 2025-01-06

## 2024-12-30 NOTE — PROGRESS NOTES
Subjective:    CC: Post-op Evaluation of the Right Knee (post op R TKA 12/13/2024, wbat, ambulates with walker, still having constant pain, has been doing pt 2x week, )       HPI:  Patient returns to clinic for follow up of a right total knee arthroplasty. Approximately 2 weeks out. The patient states he remains to be in pain; taking narcotic every 5 hours. Denies signs of infection. The patient is working with a physical therapist and making progress although slowly as he is only able to attend PT 2x/week right now given finances. Ambulating with a walker. Taking blood thinner. No new complaints.    ROS: Refer to HPI for pertinent ROS. All other 12 point systems negative.    Objective:    There were no vitals filed for this visit.     Physical Exam:  Right lower extremity compartments are soft and warm.  There are no signs or symptoms of DVT or infection. Incisions are well-healed. Staples have been removed and steri strips applied. Knee ROM is 2-70 degrees today.  The patient is appropriately tender to palpation. The patella is tracking appropriately. The knee is stable to stressing. Neurovascularly intact distally.          Images: X-rays: 3 views of the Right knee demonstrate a well aligned total knee arthroplasty without evidence of loosening or infection. Images Reviewed and discussed with patient.    Assessment:  1. Status post total right knee replacement  - X-Ray Knee 3 View Right; Future  - HYDROcodone-acetaminophen (NORCO)  mg per tablet; Take 1 tablet by mouth every 6 (six) hours as needed for Pain.  Dispense: 28 tablet; Refill: 0  - methocarbamoL (ROBAXIN) 750 MG Tab; Take 1 tablet (750 mg total) by mouth every 6 (six) hours as needed (muscle spasms).  Dispense: 28 tablet; Refill: 0    2. Localized osteoarthritis of right knee       Plan:  Physical exam, x-rays, and intraoperative findings were discussed with the patient. Wound care instructions given.  Increased patient's pain medication and also  gave refill of muscle relaxer this I believe this could be limiting some of his physical therapy progress.  We have also discussed the importance of at-home exercise program as he is only able to attend PT twice a week at this time.  Heavy emphasis was given on the importance of gaining flexion and terminal extension.  I would like to see the patient back in 4 weeks to assess the patients progress.    Follow up: Follow up in about 4 weeks (around 1/27/2025).

## 2024-12-30 NOTE — LETTER
[facility]  [address] [place] [address] [address]  [place], [address]  Phone: [phone]  Fax: [phone]    Name:Serge Prado  :1955   Date:2024     PATIENT IS UNABLE TO WORK AS OF: 24  [_] Pending treatment.  [x] For approximately [_] Days [_] Weeks [3] Months  [_] Pending diagnostic testing.  [_] Pending surgical treatment.  [_] For approximately _ months (Post Surgery)    PATIENT IS ABLE TO RETURN TO WORK AS OF: anticipated 3 months off - will re-evaluate on 3/25/25.      Louis Marroquin MD / Adry Good PA-C

## 2025-01-01 ENCOUNTER — PATIENT MESSAGE (OUTPATIENT)
Dept: ADMINISTRATIVE | Facility: OTHER | Age: 70
End: 2025-01-01
Payer: COMMERCIAL

## 2025-01-05 ENCOUNTER — PATIENT MESSAGE (OUTPATIENT)
Dept: ADMINISTRATIVE | Facility: OTHER | Age: 70
End: 2025-01-05
Payer: COMMERCIAL

## 2025-01-10 DIAGNOSIS — Z96.651 STATUS POST TOTAL RIGHT KNEE REPLACEMENT: ICD-10-CM

## 2025-01-10 RX ORDER — HYDROCODONE BITARTRATE AND ACETAMINOPHEN 10; 325 MG/1; MG/1
1 TABLET ORAL EVERY 6 HOURS PRN
Qty: 28 TABLET | Refills: 0 | Status: SHIPPED | OUTPATIENT
Start: 2025-01-10 | End: 2025-01-17

## 2025-01-10 RX ORDER — HYDROCODONE BITARTRATE AND ACETAMINOPHEN 10; 325 MG/1; MG/1
1 TABLET ORAL EVERY 6 HOURS PRN
Qty: 28 TABLET | Refills: 0 | Status: SHIPPED | OUTPATIENT
Start: 2025-01-10 | End: 2025-01-10

## 2025-01-10 RX ORDER — METHOCARBAMOL 750 MG/1
750 TABLET, FILM COATED ORAL EVERY 8 HOURS PRN
Qty: 21 TABLET | Refills: 0 | Status: SHIPPED | OUTPATIENT
Start: 2025-01-10 | End: 2025-01-17

## 2025-01-27 ENCOUNTER — OFFICE VISIT (OUTPATIENT)
Dept: ORTHOPEDICS | Facility: CLINIC | Age: 70
End: 2025-01-27
Payer: COMMERCIAL

## 2025-01-27 DIAGNOSIS — Z96.651 STATUS POST TOTAL RIGHT KNEE REPLACEMENT: Primary | ICD-10-CM

## 2025-01-27 PROCEDURE — 99024 POSTOP FOLLOW-UP VISIT: CPT | Mod: ,,,

## 2025-01-27 PROCEDURE — 1160F RVW MEDS BY RX/DR IN RCRD: CPT | Mod: CPTII,,,

## 2025-01-27 PROCEDURE — 1159F MED LIST DOCD IN RCRD: CPT | Mod: CPTII,,,

## 2025-01-27 PROCEDURE — 4010F ACE/ARB THERAPY RXD/TAKEN: CPT | Mod: CPTII,,,

## 2025-01-27 RX ORDER — HYDROCODONE BITARTRATE AND ACETAMINOPHEN 10; 325 MG/1; MG/1
1 TABLET ORAL EVERY 8 HOURS PRN
Qty: 21 TABLET | Refills: 0 | Status: SHIPPED | OUTPATIENT
Start: 2025-01-27 | End: 2025-02-03

## 2025-01-27 RX ORDER — METHOCARBAMOL 750 MG/1
750 TABLET, FILM COATED ORAL EVERY 8 HOURS PRN
Qty: 21 TABLET | Refills: 0 | Status: SHIPPED | OUTPATIENT
Start: 2025-01-27 | End: 2025-02-03

## 2025-01-27 NOTE — LETTER
[facility]  [address] [place] [address] [address]  Re[place] Castro, [address]  Phone: [phone]  Fax: [phone]    Name:Serge Prado  :1955   Date:2025     PATIENT IS UNABLE TO WORK AS OF: 25  [_] Pending treatment.  [x] For approximately [_] Days [1] Weeks [_] Months  [_] Pending diagnostic testing.  [_] Pending surgical treatment.  [_] For approximately _ months (Post Surgery)    PATIENT IS ABLE TO RETURN TO WORK AS OF: re-eval at next appt on 2/3/25    COMMENTS       Louis Marroquin MD / Adry Good PA-C

## 2025-01-27 NOTE — PROGRESS NOTES
Subjective:    CC: Follow-up of the Right Knee (R TKA 12/13/24. Pt states knee is doing ok. Having a lot of pain and trouble stretching it. No swelling. Pain is mainly at night. Taking pain meds. No other concerns with it.)       HPI:  Patient returns to clinic for follow up of a right total knee arthroplasty on 12/16/24. Approximately 6 weeks out. The patient states his knee is doing okay but still has stiffness and discomfort.  Utilizing Norco 10 every 6 hours.  Working with a formal physical therapist in his increase to 3 times a week.  He states she she was able to reach 105° this past Friday. Denies signs of infection.  Ambulating with a cane. No new complaints.    ROS: Refer to HPI for pertinent ROS. All other 12 point systems negative.    Objective:    There were no vitals filed for this visit.     Physical Exam:  Right lower extremity compartments are soft and warm.  There are no signs or symptoms of DVT or infection. Incisions are well-healed. Knee ROM is 0-95 degrees today with assistance.  The patient is appropriately tender to palpation. The patella is tracking appropriately. The knee is stable to stressing. Neurovascularly intact distally.          Images: previous Images Reviewed and discussed with patient.    Assessment:  1. Status post total right knee replacement  - HYDROcodone-acetaminophen (NORCO)  mg per tablet; Take 1 tablet by mouth every 8 (eight) hours as needed for Pain.  Dispense: 21 tablet; Refill: 0  - methocarbamoL (ROBAXIN) 750 MG Tab; Take 1 tablet (750 mg total) by mouth every 8 (eight) hours as needed (muscle spasms).  Dispense: 21 tablet; Refill: 0       Plan:  Physical exam, x-rays, and intraoperative findings were discussed with the patient. Wound care instructions given. The Patient was instructed to take pain medication as needed with appropriate precautions and continue PT to gain strength and ROM.  Heavy emphasis was again applied on physical therapy and at-home exercise  program to gain further flexion.  We have discussed his work duties; he will remain off at this time.  I would like to see the patient back in 1 week to assess the patients progress.  We have discussed a knee manipulation if he does not improve.    Follow up: Follow up in about 1 week (around 2/3/2025).

## 2025-02-03 ENCOUNTER — OFFICE VISIT (OUTPATIENT)
Dept: ORTHOPEDICS | Facility: CLINIC | Age: 70
End: 2025-02-03
Payer: COMMERCIAL

## 2025-02-03 VITALS — HEIGHT: 69 IN | BODY MASS INDEX: 39.48 KG/M2 | WEIGHT: 266.56 LBS

## 2025-02-03 DIAGNOSIS — Z96.651 STATUS POST TOTAL RIGHT KNEE REPLACEMENT: Primary | ICD-10-CM

## 2025-02-03 PROCEDURE — 1160F RVW MEDS BY RX/DR IN RCRD: CPT | Mod: CPTII,,, | Performed by: ORTHOPAEDIC SURGERY

## 2025-02-03 PROCEDURE — 3288F FALL RISK ASSESSMENT DOCD: CPT | Mod: CPTII,,, | Performed by: ORTHOPAEDIC SURGERY

## 2025-02-03 PROCEDURE — 1159F MED LIST DOCD IN RCRD: CPT | Mod: CPTII,,, | Performed by: ORTHOPAEDIC SURGERY

## 2025-02-03 PROCEDURE — 99024 POSTOP FOLLOW-UP VISIT: CPT | Mod: ,,, | Performed by: ORTHOPAEDIC SURGERY

## 2025-02-03 PROCEDURE — 4010F ACE/ARB THERAPY RXD/TAKEN: CPT | Mod: CPTII,,, | Performed by: ORTHOPAEDIC SURGERY

## 2025-02-03 PROCEDURE — 1101F PT FALLS ASSESS-DOCD LE1/YR: CPT | Mod: CPTII,,, | Performed by: ORTHOPAEDIC SURGERY

## 2025-02-03 NOTE — LETTER
[facility]  [address] [place] [address] [address]  Rehan Castro, [address]  Phone: [phone]  Fax: [phone]    Name:Serge Prado  :1955   Date:2025     PATIENT IS UNABLE TO WORK AS OF: 2/3/25  [_] Pending treatment.  [x] For approximately [_] Days [2] Weeks [_] Months  [_] Pending diagnostic testing.  [_] Pending surgical treatment.  [_] For approximately _ months (Post Surgery)    PATIENT IS ABLE TO RETURN TO WORK AS OF: re-evaluate at next appt on 25    COMMENTS         Louis Marroquin MD / Adry Good PA-C

## 2025-02-04 NOTE — PROGRESS NOTES
"Subjective:    CC: Follow-up of the Right Knee (R TKA 12/13/24 - 3/13/25 - pt states that he was able to bend his knee to 105. He is doing PT and exercises at home (walking more) - pain is mostly at night. )       HPI:  Returns today for repeat exam.  Patient is a proximally 7 weeks from his right total knee arthroplasty.  Over the last week, he is doing better    ROS: Refer to HPI for pertinent ROS. All other 12 point systems negative.    Objective:  Vitals:    02/03/25 1525   Weight: 120.9 kg (266 lb 8.6 oz)   Height: 5' 9" (1.753 m)        Physical Exam:  Right lower extremity compartment soft and warm.  Skin is intact.  There is no signs symptoms of DVT or infection.  He has about 2° short of full extension.  He is now able to flex to about her released 105° today in clinic.  Stable to stressing, neurovascular intact distally.    Images: . Images Reviewed and discussed with patient.    Assessment:  1. Status post total right knee replacement        Plan:  This time we discussed his physical exam and intraoperative findings.  Today we will hold off on a knee medial ambulation.  He will continue with his aggressive home therapy as well as formal therapy to regain his strength and motion.  I would like see back in 2 weeks to make sure he is still progressing, we have discussed a knee manipulation if he does plateau.    Follow UP: No follow-ups on file.              "

## 2025-02-08 ENCOUNTER — LAB VISIT (OUTPATIENT)
Dept: LAB | Facility: HOSPITAL | Age: 70
End: 2025-02-08
Attending: INTERNAL MEDICINE
Payer: COMMERCIAL

## 2025-02-08 DIAGNOSIS — E78.5 HYPERLIPIDEMIA, UNSPECIFIED HYPERLIPIDEMIA TYPE: Primary | ICD-10-CM

## 2025-02-08 LAB
ALBUMIN SERPL-MCNC: 4.1 G/DL (ref 3.4–4.8)
ALBUMIN/GLOB SERPL: 1.1 RATIO (ref 1.1–2)
ALP SERPL-CCNC: 73 UNIT/L (ref 40–150)
ALT SERPL-CCNC: 28 UNIT/L (ref 0–55)
ANION GAP SERPL CALC-SCNC: 10 MEQ/L
AST SERPL-CCNC: 26 UNIT/L (ref 5–34)
BILIRUB SERPL-MCNC: 0.5 MG/DL
BUN SERPL-MCNC: 11.2 MG/DL (ref 8.4–25.7)
CALCIUM SERPL-MCNC: 9.6 MG/DL (ref 8.8–10)
CHLORIDE SERPL-SCNC: 105 MMOL/L (ref 98–107)
CHOLEST SERPL-MCNC: 127 MG/DL
CHOLEST/HDLC SERPL: 3 {RATIO} (ref 0–5)
CO2 SERPL-SCNC: 25 MMOL/L (ref 23–31)
CREAT SERPL-MCNC: 0.94 MG/DL (ref 0.72–1.25)
CREAT/UREA NIT SERPL: 12
GFR SERPLBLD CREATININE-BSD FMLA CKD-EPI: >60 ML/MIN/1.73/M2
GLOBULIN SER-MCNC: 3.6 GM/DL (ref 2.4–3.5)
GLUCOSE SERPL-MCNC: 114 MG/DL (ref 82–115)
HDLC SERPL-MCNC: 39 MG/DL (ref 35–60)
LDLC SERPL CALC-MCNC: 48 MG/DL (ref 50–140)
POTASSIUM SERPL-SCNC: 4.1 MMOL/L (ref 3.5–5.1)
PROT SERPL-MCNC: 7.7 GM/DL (ref 5.8–7.6)
SODIUM SERPL-SCNC: 140 MMOL/L (ref 136–145)
TRIGL SERPL-MCNC: 199 MG/DL (ref 34–140)
VLDLC SERPL CALC-MCNC: 40 MG/DL

## 2025-02-08 PROCEDURE — 36415 COLL VENOUS BLD VENIPUNCTURE: CPT

## 2025-02-08 PROCEDURE — 80053 COMPREHEN METABOLIC PANEL: CPT

## 2025-02-08 PROCEDURE — 80061 LIPID PANEL: CPT

## 2025-02-17 ENCOUNTER — OFFICE VISIT (OUTPATIENT)
Dept: ORTHOPEDICS | Facility: CLINIC | Age: 70
End: 2025-02-17
Payer: COMMERCIAL

## 2025-02-17 DIAGNOSIS — Z96.651 STATUS POST TOTAL RIGHT KNEE REPLACEMENT: Primary | ICD-10-CM

## 2025-02-17 NOTE — PROGRESS NOTES
Subjective:    CC: Follow-up of the Right Knee (F/U R TKA 12/13/24. Pt states knee is doing ok. Has pain some days and is more severe at times. Still doing PT. Pain is more severe at night. No swelling. No other concerns with it besides an increase in pain.)       HPI:  Patient returns today for repeat exam.  Patient is 2 months from his right total knee arthroplasty.  Patient states it physical therapy he was bending now to 110.  He states slowly he is continuing to improve.    ROS: Refer to HPI for pertinent ROS. All other 12 point systems negative.    Objective:  There were no vitals filed for this visit.     Physical Exam:  Right lower extremity compartment soft and warm.  Skin is intact.  There is no signs symptoms of DVT or infection.  Her incision is well healed there is no swelling there is no erythema no joint effusion.  His motion today is 0-110 stable to stressing, neurovascular intact distally.    Images: . Images Reviewed and discussed with patient.    Assessment:  1. Status post total right knee replacement        Plan:  At this time we discussed his physical exam and previous imaging findings.  He will continue physical therapy to regain his strength and motion.  I would like his motion at 120.  I would like see back in 4 weeks to see how he is progressing.    Follow UP: No follow-ups on file.

## 2025-02-17 NOTE — LETTER
February 17, 2025    Serge Prado  [address] [place] [address]             [facility] [address], [address]  [place] [address]  Phone: [phone] To whom it may concern:  Please excuse above patient from work for the next 4 weeks (2/17/25 - 3/17/25).    If you have any questions or concerns, please don't hesitate to call.    Sincerely,  Louis Marroquin MD

## 2025-03-02 NOTE — TELEPHONE ENCOUNTER
After speaking with Meche, decided to postpone surgery until 7/5/23. Called pt and he verbalized under standing.    1

## 2025-03-12 ENCOUNTER — LAB VISIT (OUTPATIENT)
Dept: LAB | Facility: HOSPITAL | Age: 70
End: 2025-03-12
Attending: FAMILY MEDICINE
Payer: COMMERCIAL

## 2025-03-12 DIAGNOSIS — Z00.00 WELLNESS EXAMINATION: ICD-10-CM

## 2025-03-12 LAB
25(OH)D3+25(OH)D2 SERPL-MCNC: 48 NG/ML (ref 30–80)
ALBUMIN SERPL-MCNC: 4.1 G/DL (ref 3.4–4.8)
ALBUMIN/GLOB SERPL: 1.1 RATIO (ref 1.1–2)
ALP SERPL-CCNC: 83 UNIT/L (ref 40–150)
ALT SERPL-CCNC: 25 UNIT/L (ref 0–55)
ANION GAP SERPL CALC-SCNC: 10 MEQ/L
AST SERPL-CCNC: 23 UNIT/L (ref 5–34)
BASOPHILS # BLD AUTO: 0.03 X10(3)/MCL
BASOPHILS NFR BLD AUTO: 0.4 %
BILIRUB SERPL-MCNC: 0.6 MG/DL
BUN SERPL-MCNC: 13.2 MG/DL (ref 8.4–25.7)
CALCIUM SERPL-MCNC: 9.5 MG/DL (ref 8.8–10)
CHLORIDE SERPL-SCNC: 103 MMOL/L (ref 98–107)
CO2 SERPL-SCNC: 25 MMOL/L (ref 23–31)
CREAT SERPL-MCNC: 1.05 MG/DL (ref 0.72–1.25)
CREAT UR-MCNC: 152.2 MG/DL (ref 63–166)
CREAT/UREA NIT SERPL: 13
EOSINOPHIL # BLD AUTO: 0.12 X10(3)/MCL (ref 0–0.9)
EOSINOPHIL NFR BLD AUTO: 1.4 %
ERYTHROCYTE [DISTWIDTH] IN BLOOD BY AUTOMATED COUNT: 11.9 % (ref 11.5–17)
EST. AVERAGE GLUCOSE BLD GHB EST-MCNC: 105.4 MG/DL
FOLATE SERPL-MCNC: 15.6 NG/ML (ref 7–31.4)
GFR SERPLBLD CREATININE-BSD FMLA CKD-EPI: >60 ML/MIN/1.73/M2
GLOBULIN SER-MCNC: 3.6 GM/DL (ref 2.4–3.5)
GLUCOSE SERPL-MCNC: 110 MG/DL (ref 82–115)
HBA1C MFR BLD: 5.3 %
HCT VFR BLD AUTO: 41.8 % (ref 42–52)
HGB BLD-MCNC: 14.1 G/DL (ref 14–18)
IMM GRANULOCYTES # BLD AUTO: 0.01 X10(3)/MCL (ref 0–0.04)
IMM GRANULOCYTES NFR BLD AUTO: 0.1 %
LYMPHOCYTES # BLD AUTO: 3.23 X10(3)/MCL (ref 0.6–4.6)
LYMPHOCYTES NFR BLD AUTO: 37.9 %
MCH RBC QN AUTO: 32.3 PG (ref 27–31)
MCHC RBC AUTO-ENTMCNC: 33.7 G/DL (ref 33–36)
MCV RBC AUTO: 95.7 FL (ref 80–94)
MICROALBUMIN UR-MCNC: 15 UG/ML
MICROALBUMIN/CREAT RATIO PNL UR: 9.9 MG/GM CR (ref 0–30)
MONOCYTES # BLD AUTO: 0.5 X10(3)/MCL (ref 0.1–1.3)
MONOCYTES NFR BLD AUTO: 5.9 %
NEUTROPHILS # BLD AUTO: 4.64 X10(3)/MCL (ref 2.1–9.2)
NEUTROPHILS NFR BLD AUTO: 54.3 %
PLATELET # BLD AUTO: 225 X10(3)/MCL (ref 130–400)
PMV BLD AUTO: 9.5 FL (ref 7.4–10.4)
POTASSIUM SERPL-SCNC: 4.2 MMOL/L (ref 3.5–5.1)
PROT SERPL-MCNC: 7.7 GM/DL (ref 5.8–7.6)
PSA SERPL-MCNC: 1.18 NG/ML
RBC # BLD AUTO: 4.37 X10(6)/MCL (ref 4.7–6.1)
SODIUM SERPL-SCNC: 138 MMOL/L (ref 136–145)
T3FREE SERPL-MCNC: 2.95 PG/ML (ref 1.58–3.91)
T4 FREE SERPL-MCNC: 0.95 NG/DL (ref 0.7–1.48)
TSH SERPL-ACNC: 2.39 UIU/ML (ref 0.35–4.94)
VIT B12 SERPL-MCNC: 334 PG/ML (ref 213–816)
WBC # BLD AUTO: 8.53 X10(3)/MCL (ref 4.5–11.5)

## 2025-03-12 PROCEDURE — 80053 COMPREHEN METABOLIC PANEL: CPT

## 2025-03-12 PROCEDURE — 85025 COMPLETE CBC W/AUTO DIFF WBC: CPT

## 2025-03-12 PROCEDURE — 82570 ASSAY OF URINE CREATININE: CPT

## 2025-03-12 PROCEDURE — 84443 ASSAY THYROID STIM HORMONE: CPT

## 2025-03-12 PROCEDURE — 82607 VITAMIN B-12: CPT

## 2025-03-12 PROCEDURE — 82306 VITAMIN D 25 HYDROXY: CPT

## 2025-03-12 PROCEDURE — 36415 COLL VENOUS BLD VENIPUNCTURE: CPT

## 2025-03-12 PROCEDURE — 84153 ASSAY OF PSA TOTAL: CPT

## 2025-03-12 PROCEDURE — 82746 ASSAY OF FOLIC ACID SERUM: CPT

## 2025-03-12 PROCEDURE — 84439 ASSAY OF FREE THYROXINE: CPT

## 2025-03-12 PROCEDURE — 84481 FREE ASSAY (FT-3): CPT

## 2025-03-12 PROCEDURE — 83036 HEMOGLOBIN GLYCOSYLATED A1C: CPT

## 2025-03-17 ENCOUNTER — OFFICE VISIT (OUTPATIENT)
Dept: ORTHOPEDICS | Facility: CLINIC | Age: 70
End: 2025-03-17
Payer: COMMERCIAL

## 2025-03-17 VITALS — HEIGHT: 69 IN | BODY MASS INDEX: 38.53 KG/M2 | WEIGHT: 260.13 LBS

## 2025-03-17 DIAGNOSIS — Z96.651 STATUS POST TOTAL RIGHT KNEE REPLACEMENT: Primary | ICD-10-CM

## 2025-03-17 PROCEDURE — 3008F BODY MASS INDEX DOCD: CPT | Mod: CPTII,,, | Performed by: ORTHOPAEDIC SURGERY

## 2025-03-17 PROCEDURE — 4010F ACE/ARB THERAPY RXD/TAKEN: CPT | Mod: CPTII,,, | Performed by: ORTHOPAEDIC SURGERY

## 2025-03-17 PROCEDURE — 1101F PT FALLS ASSESS-DOCD LE1/YR: CPT | Mod: CPTII,,, | Performed by: ORTHOPAEDIC SURGERY

## 2025-03-17 PROCEDURE — 3288F FALL RISK ASSESSMENT DOCD: CPT | Mod: CPTII,,, | Performed by: ORTHOPAEDIC SURGERY

## 2025-03-17 PROCEDURE — 1160F RVW MEDS BY RX/DR IN RCRD: CPT | Mod: CPTII,,, | Performed by: ORTHOPAEDIC SURGERY

## 2025-03-17 PROCEDURE — 3066F NEPHROPATHY DOC TX: CPT | Mod: CPTII,,, | Performed by: ORTHOPAEDIC SURGERY

## 2025-03-17 PROCEDURE — 99213 OFFICE O/P EST LOW 20 MIN: CPT | Mod: ,,, | Performed by: ORTHOPAEDIC SURGERY

## 2025-03-17 PROCEDURE — 1159F MED LIST DOCD IN RCRD: CPT | Mod: CPTII,,, | Performed by: ORTHOPAEDIC SURGERY

## 2025-03-17 PROCEDURE — 3044F HG A1C LEVEL LT 7.0%: CPT | Mod: CPTII,,, | Performed by: ORTHOPAEDIC SURGERY

## 2025-03-17 PROCEDURE — 3061F NEG MICROALBUMINURIA REV: CPT | Mod: CPTII,,, | Performed by: ORTHOPAEDIC SURGERY

## 2025-03-17 NOTE — LETTER
[facility]  [address] [place] [address] [address]  [place] Castro, [address]  Phone: [phone]  Fax: [phone]    Name:Serge Prado  :1955   Date:2025     PATIENT IS UNABLE TO WORK AS OF: 3/17/25  [_] Pending treatment.  [x] For approximately [_] Days [8] Weeks [_] Months  [_] Pending diagnostic testing.  [_] Pending surgical treatment.  [_] For approximately _ months (Post Surgery)    PATIENT IS ABLE TO RETURN TO WORK AS OF: re-eval at next appt 25      COMMENTS         Louis Marroquin MD / Adry Good PA-C

## 2025-03-18 NOTE — PROGRESS NOTES
Subjective:    CC: Follow-up of the Right Knee (f/u R TKA 12/13/24- 3/13/25, wbat, ambulates without assistance, reports still had moderate pain, has some improvement, still in pt at this time, )       History of Present Illness    CHIEF COMPLAINT:  Follow-up status post knee replacement surgery.    HPI:  Patient presents for a follow-up visit regarding his knee surgery. He reports intermittent knee pain and discomfort, particularly after increased activity and at night. He had to take pain medication last week, but not daily. He had trouble sleeping last Tuesday due to pain.    His knee symptoms limit his ability to perform certain activities. He's trying to do a few things outside, but experiences increased pain afterward. He expresses concern about returning to work, noting that he won't be able to perform his previous job duties. 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        PREVIOUS TREATMENTS:  Patient has undergone knee replacement surgery. He is currently engaged in ongoing physical therapy, which includes strength training and stability exercises.    MEDICATIONS:  Patient takes pain medication as needed, not on a daily basis. He took the medication a few times last week to address sleep-disrupting pain.    SURGICAL HISTORY:  Patient has undergone knee replacement surgery.    WORK STATUS:  Patient is currently not working due to his knee surgery. His job involves physically demanding activities, including climbing on buildings.         ROS: Refer to HPI for pertinent ROS. All other 12 point systems negative.    Past Medical History:   Diagnosis Date    Arthritis     Atrial fibrillation with RVR     Back pain     GERD (gastroesophageal reflux disease)     Hemorrhoids     Hyperlipidemia     Hypertension         Past Surgical History:   Procedure Laterality Date    ABLATION N/A 12/08/2022    Procedure: ABLATION;  Surgeon: Scout Cary MD;  [facility];   Service: Cardiology;  Laterality: N/A;  EPS + AF CATH ABLATION W/ KETTY & ANEST.    ARTHROSCOPIC DEBRIDEMENT OF SHOULDER Left 07/07/2023    Procedure: DEBRIDEMENT, SHOULDER, ARTHROSCOPIC;  Surgeon: Louis Marroquin MD;  Location: [facility] OR;  Service: Orthopedics;  Laterality: Left;    ARTHROSCOPIC REPAIR OF ROTATOR CUFF OF SHOULDER Left 07/07/2023    Procedure: REPAIR, ROTATOR CUFF, ARTHROSCOPIC;  Surgeon: Louis Marroquin MD;  Location: [facility] OR;  Service: Orthopedics;  Laterality: Left;    ARTHROSCOPIC TENOTOMY OF BICEPS TENDON Left 07/07/2023    Procedure: TENOTOMY, BICEPS, ARTHROSCOPIC;  Surgeon: Louis Marroquin MD;  Location: [facility] OR;  Service: Orthopedics;  Laterality: Left;    ARTHROSCOPY OF KNEE Right     BACK SURGERY      lower fusion    COLONOSCOPY  07/31/2018    Nitin Batres MD    ROBOTIC ARTHROPLASTY, KNEE Right 12/13/2024    Procedure: ROBOTIC ARTHROPLASTY, KNEE, TOTAL;  Surgeon: Louis Marroquin MD;  Location: [facility] OR;  Service: Orthopedics;  Laterality: Right;    SHOULDER ARTHROSCOPY Right     KETTY/DCCV  07/2022        Medications Ordered Prior to Encounter[1]     Review of patient's allergies indicates:   Allergen Reactions    Morphine Rash       Objective:    There were no vitals filed for this visit.     Physical Exam:  Right lower extremity compartment soft and warm.  Skin is intact.  There is no signs or symptoms of DVT or infection.  His incision is well healed there is no swelling there was no erythema there is no joint effusion.  His motion today is 0-115 degrees he is stable to stressing, patella is tracking appropriate, neurovascular intact distally.    Images:   Images Reviewed and discussed with patient.    Assessment:  1. Status post total right knee replacement       Plan:  Assessment & Plan    RIGHT KNEE PAIN AND ARTIFICIAL JOINT:  Contact the office if patient wants to try light duty work.  Continue physical therapy, knee exercises to regain his full strength and motion and  endurance    FOLLOW-UP:  Follow up in 2 months.          Follow up: No follow-ups on file.          This note was generated with the assistance of ambient listening technology. Verbal consent was obtained by the patient and accompanying visitor(s) for the recording of patient appointment to facilitate this note. I attest to having reviewed and edited the generated note for accuracy, though some syntax or spelling errors may persist. Please contact the author of this note for any clarification.          [1]   Current Outpatient Medications on File Prior to Visit   Medication Sig Dispense Refill    amlodipine-olmesartan (ANGI) 10-40 mg per tablet Take 1 tablet by mouth once daily. 90 tablet 2    cholecalciferol, vitamin D3, (VITAMIN D3) 25 mcg (1,000 unit) capsule Take 2,000 Units by mouth once daily.      folic acid/multivit-min/lutein (CENTRUM SILVER ORAL) Take 1 tablet by mouth once daily.      hydroCHLOROthiazide (HYDRODIURIL) 25 MG tablet Take 0.5 tablets (12.5 mg total) by mouth once daily. 45 tablet 3    isosorbide mononitrate (IMDUR) 60 MG 24 hr tablet Take 60 mg by mouth once daily.      metoprolol succinate (TOPROL-XL) 25 MG 24 hr tablet Take 1 tablet (25 mg total) by mouth 2 (two) times daily. 90 tablet 3    pantoprazole (PROTONIX) 40 MG tablet Take 1 tablet (40 mg total) by mouth once daily. 90 tablet 3    rivaroxaban (XARELTO) 20 mg Tab Take 20 mg by mouth Daily.      rosuvastatin (CRESTOR) 10 MG tablet Take 10 mg by mouth once daily.       No current facility-administered medications on file prior to visit.

## 2025-05-12 ENCOUNTER — OFFICE VISIT (OUTPATIENT)
Dept: ORTHOPEDICS | Facility: CLINIC | Age: 70
End: 2025-05-12
Payer: COMMERCIAL

## 2025-05-12 DIAGNOSIS — Z96.651 STATUS POST TOTAL RIGHT KNEE REPLACEMENT: Primary | ICD-10-CM

## 2025-05-12 PROCEDURE — 3288F FALL RISK ASSESSMENT DOCD: CPT | Mod: CPTII,,, | Performed by: ORTHOPAEDIC SURGERY

## 2025-05-12 PROCEDURE — 3044F HG A1C LEVEL LT 7.0%: CPT | Mod: CPTII,,, | Performed by: ORTHOPAEDIC SURGERY

## 2025-05-12 PROCEDURE — 1101F PT FALLS ASSESS-DOCD LE1/YR: CPT | Mod: CPTII,,, | Performed by: ORTHOPAEDIC SURGERY

## 2025-05-12 PROCEDURE — 3066F NEPHROPATHY DOC TX: CPT | Mod: CPTII,,, | Performed by: ORTHOPAEDIC SURGERY

## 2025-05-12 PROCEDURE — 99213 OFFICE O/P EST LOW 20 MIN: CPT | Mod: ,,, | Performed by: ORTHOPAEDIC SURGERY

## 2025-05-12 PROCEDURE — 1160F RVW MEDS BY RX/DR IN RCRD: CPT | Mod: CPTII,,, | Performed by: ORTHOPAEDIC SURGERY

## 2025-05-12 PROCEDURE — 1159F MED LIST DOCD IN RCRD: CPT | Mod: CPTII,,, | Performed by: ORTHOPAEDIC SURGERY

## 2025-05-12 PROCEDURE — 4010F ACE/ARB THERAPY RXD/TAKEN: CPT | Mod: CPTII,,, | Performed by: ORTHOPAEDIC SURGERY

## 2025-05-12 PROCEDURE — 3061F NEG MICROALBUMINURIA REV: CPT | Mod: CPTII,,, | Performed by: ORTHOPAEDIC SURGERY

## 2025-05-12 NOTE — LETTER
[facility]  [address] [place] [address] [address]  [place], [address]  Phone: [phone]  Fax: [phone]    Name:Serge Prado  :1955   Date:2025     PATIENT IS UNABLE TO WORK AS OF: 25  [_] Pending treatment.  [x] For approximately [_] Days [_] Weeks [3] Months  [_] Pending diagnostic testing.  [_] Pending surgical treatment.  [_] For approximately _ months (Post Surgery)    PATIENT IS ABLE TO RETURN TO WORK AS OF: re-eval at next appt on 25    COMMENTS     Louis Marroquin MD / Adry Pitts PA-C

## 2025-05-15 NOTE — PROGRESS NOTES
Subjective:    CC: Follow-up of the Right Knee (F/U R TKA 12/13/24. Pt states knee is doing ok. Able to do more but still has trouble with it. Pain is severe mainly at night. Has pain throughout day that's worse than other days. Ices it when he needs. Doing home exercises, noticed a little difference. Has pinging on outside of leg. No other concerns with it.)       HPI:  Patient returns today for repeat exam.  Patient is a proximally 6 months now from his right total knee arthroplasty.  Patient states his knee is better, he states he continues to improve with physical therapy, still having some pain with the especially at night.  Patient states he does not feel ready to returned to work given his job duties.    ROS: Refer to HPI for pertinent ROS. All other 12 point systems negative.    Objective:  There were no vitals filed for this visit.     Physical Exam:  Right lower extremity compartment soft and warm.  Skin is intact.  There is no signs or symptoms of DVT or infection.  His incision is well healed there was no swelling or erythema, there was no joint effusion.  His motion is 0-115 degrees he is stable to stressing, patella is tracking appropriately, neurovascularly intact distally.    Images: . Images Reviewed and discussed with patient.    Assessment:  1. Status post total right knee replacement        Plan:  At this time we discussed his physical exam and previous imaging findings.  He will continue physical therapy to regain his full strength and motion.  I would like see him back in 3 months to see how he is progressing.    Follow UP: No follow-ups on file.

## 2025-08-13 ENCOUNTER — OFFICE VISIT (OUTPATIENT)
Dept: ORTHOPEDICS | Facility: CLINIC | Age: 70
End: 2025-08-13
Payer: COMMERCIAL

## 2025-08-13 VITALS
SYSTOLIC BLOOD PRESSURE: 153 MMHG | HEIGHT: 69 IN | BODY MASS INDEX: 40.88 KG/M2 | WEIGHT: 276 LBS | TEMPERATURE: 98 F | DIASTOLIC BLOOD PRESSURE: 79 MMHG | HEART RATE: 86 BPM

## 2025-08-13 DIAGNOSIS — Z96.651 STATUS POST TOTAL RIGHT KNEE REPLACEMENT: Primary | ICD-10-CM

## 2025-08-13 PROCEDURE — 3288F FALL RISK ASSESSMENT DOCD: CPT | Mod: CPTII,,, | Performed by: ORTHOPAEDIC SURGERY

## 2025-08-13 PROCEDURE — 1159F MED LIST DOCD IN RCRD: CPT | Mod: CPTII,,, | Performed by: ORTHOPAEDIC SURGERY

## 2025-08-13 PROCEDURE — 3078F DIAST BP <80 MM HG: CPT | Mod: CPTII,,, | Performed by: ORTHOPAEDIC SURGERY

## 2025-08-13 PROCEDURE — 3008F BODY MASS INDEX DOCD: CPT | Mod: CPTII,,, | Performed by: ORTHOPAEDIC SURGERY

## 2025-08-13 PROCEDURE — 3061F NEG MICROALBUMINURIA REV: CPT | Mod: CPTII,,, | Performed by: ORTHOPAEDIC SURGERY

## 2025-08-13 PROCEDURE — 3077F SYST BP >= 140 MM HG: CPT | Mod: CPTII,,, | Performed by: ORTHOPAEDIC SURGERY

## 2025-08-13 PROCEDURE — 1125F AMNT PAIN NOTED PAIN PRSNT: CPT | Mod: CPTII,,, | Performed by: ORTHOPAEDIC SURGERY

## 2025-08-13 PROCEDURE — 4010F ACE/ARB THERAPY RXD/TAKEN: CPT | Mod: CPTII,,, | Performed by: ORTHOPAEDIC SURGERY

## 2025-08-13 PROCEDURE — 1160F RVW MEDS BY RX/DR IN RCRD: CPT | Mod: CPTII,,, | Performed by: ORTHOPAEDIC SURGERY

## 2025-08-13 PROCEDURE — 3044F HG A1C LEVEL LT 7.0%: CPT | Mod: CPTII,,, | Performed by: ORTHOPAEDIC SURGERY

## 2025-08-13 PROCEDURE — 3066F NEPHROPATHY DOC TX: CPT | Mod: CPTII,,, | Performed by: ORTHOPAEDIC SURGERY

## 2025-08-13 PROCEDURE — 1101F PT FALLS ASSESS-DOCD LE1/YR: CPT | Mod: CPTII,,, | Performed by: ORTHOPAEDIC SURGERY

## 2025-08-13 PROCEDURE — 99213 OFFICE O/P EST LOW 20 MIN: CPT | Mod: ,,, | Performed by: ORTHOPAEDIC SURGERY

## 2025-08-13 RX ORDER — MULTIVITAMIN WITH IRON
TABLET ORAL DAILY
COMMUNITY

## 2025-09-02 ENCOUNTER — HOSPITAL ENCOUNTER (OUTPATIENT)
Facility: HOSPITAL | Age: 70
Discharge: HOME | End: 2025-09-02
Attending: OPHTHALMOLOGY | Admitting: OPHTHALMOLOGY
Payer: COMMERCIAL

## 2025-09-02 ENCOUNTER — ANESTHESIA EVENT (OUTPATIENT)
Facility: HOSPITAL | Age: 70
End: 2025-09-02
Payer: COMMERCIAL

## 2025-09-02 ENCOUNTER — ANESTHESIA (OUTPATIENT)
Facility: HOSPITAL | Age: 70
End: 2025-09-02
Payer: COMMERCIAL

## 2025-09-02 VITALS
OXYGEN SATURATION: 98 % | SYSTOLIC BLOOD PRESSURE: 137 MMHG | WEIGHT: 276 LBS | BODY MASS INDEX: 40.88 KG/M2 | HEART RATE: 85 BPM | HEIGHT: 69 IN | RESPIRATION RATE: 15 BRPM | TEMPERATURE: 97 F | DIASTOLIC BLOOD PRESSURE: 80 MMHG

## 2025-09-02 DIAGNOSIS — H26.9 CATARACT: ICD-10-CM

## 2025-09-02 PROCEDURE — 37000008 HC ANESTHESIA 1ST 15 MINUTES: Performed by: OPHTHALMOLOGY

## 2025-09-02 PROCEDURE — 25000003 PHARM REV CODE 250

## 2025-09-02 PROCEDURE — 36000707: Performed by: OPHTHALMOLOGY

## 2025-09-02 PROCEDURE — 71000015 HC POSTOP RECOV 1ST HR: Performed by: OPHTHALMOLOGY

## 2025-09-02 PROCEDURE — 63600175 PHARM REV CODE 636 W HCPCS: Performed by: NURSE ANESTHETIST, CERTIFIED REGISTERED

## 2025-09-02 PROCEDURE — V2632 POST CHMBR INTRAOCULAR LENS: HCPCS | Performed by: OPHTHALMOLOGY

## 2025-09-02 PROCEDURE — 27201423 OPTIME MED/SURG SUP & DEVICES STERILE SUPPLY: Performed by: OPHTHALMOLOGY

## 2025-09-02 PROCEDURE — 37000009 HC ANESTHESIA EA ADD 15 MINS: Performed by: OPHTHALMOLOGY

## 2025-09-02 PROCEDURE — 36000706: Performed by: OPHTHALMOLOGY

## 2025-09-02 PROCEDURE — 25000003 PHARM REV CODE 250: Performed by: OPHTHALMOLOGY

## 2025-09-02 PROCEDURE — 63600175 PHARM REV CODE 636 W HCPCS: Performed by: OPHTHALMOLOGY

## 2025-09-02 DEVICE — LENS EYHANCE +17.0D: Type: IMPLANTABLE DEVICE | Site: EYE | Status: FUNCTIONAL

## 2025-09-02 RX ORDER — MIDAZOLAM HYDROCHLORIDE 1 MG/ML
INJECTION INTRAMUSCULAR; INTRAVENOUS
Status: DISCONTINUED | OUTPATIENT
Start: 2025-09-02 | End: 2025-09-02

## 2025-09-02 RX ORDER — TROPICAMIDE 10 MG/ML
2 SOLUTION/ DROPS OPHTHALMIC
Status: COMPLETED | OUTPATIENT
Start: 2025-09-02 | End: 2025-09-02

## 2025-09-02 RX ORDER — PHENYLEPHRINE HYDROCHLORIDE 100 MG/ML
2 SOLUTION/ DROPS OPHTHALMIC
Status: COMPLETED | OUTPATIENT
Start: 2025-09-02 | End: 2025-09-02

## 2025-09-02 RX ORDER — BUPIVACAINE HYDROCHLORIDE 7.5 MG/ML
INJECTION, SOLUTION EPIDURAL; RETROBULBAR
Status: DISCONTINUED | OUTPATIENT
Start: 2025-09-02 | End: 2025-09-02 | Stop reason: HOSPADM

## 2025-09-02 RX ORDER — BUPIVACAINE HYDROCHLORIDE 7.5 MG/ML
0.1 INJECTION, SOLUTION EPIDURAL; RETROBULBAR
Status: COMPLETED | OUTPATIENT
Start: 2025-09-02 | End: 2025-09-02

## 2025-09-02 RX ORDER — EPINEPHRINE 1 MG/ML
INJECTION, SOLUTION, CONCENTRATE INTRAVENOUS
Status: DISCONTINUED | OUTPATIENT
Start: 2025-09-02 | End: 2025-09-02 | Stop reason: HOSPADM

## 2025-09-02 RX ORDER — MOXIFLOXACIN 5 MG/ML
1 SOLUTION/ DROPS OPHTHALMIC
Status: COMPLETED | OUTPATIENT
Start: 2025-09-02 | End: 2025-09-02

## 2025-09-02 RX ORDER — POVIDONE-IODINE 5 %
SOLUTION, NON-ORAL OPHTHALMIC (EYE)
Status: DISCONTINUED | OUTPATIENT
Start: 2025-09-02 | End: 2025-09-02 | Stop reason: HOSPADM

## 2025-09-02 RX ORDER — LIDOCAINE HYDROCHLORIDE 40 MG/ML
INJECTION, SOLUTION RETROBULBAR
Status: DISCONTINUED | OUTPATIENT
Start: 2025-09-02 | End: 2025-09-02 | Stop reason: HOSPADM

## 2025-09-02 RX ORDER — ONDANSETRON HYDROCHLORIDE 2 MG/ML
4 INJECTION, SOLUTION INTRAVENOUS DAILY PRN
Status: DISCONTINUED | OUTPATIENT
Start: 2025-09-02 | End: 2025-09-02 | Stop reason: HOSPADM

## 2025-09-02 RX ADMIN — TROPICAMIDE 2 DROP: 10 SOLUTION/ DROPS OPHTHALMIC at 10:09

## 2025-09-02 RX ADMIN — MOXIFLOXACIN 1 DROP: 5 SOLUTION/ DROPS OPHTHALMIC at 10:09

## 2025-09-02 RX ADMIN — PHENYLEPHRINE HYDROCHLORIDE 2 DROP: 100 SOLUTION/ DROPS OPHTHALMIC at 10:09

## 2025-09-02 RX ADMIN — BUPIVACAINE HYDROCHLORIDE 0.75 MG: 7.5 INJECTION, SOLUTION EPIDURAL; RETROBULBAR at 10:09

## 2025-09-02 RX ADMIN — MIDAZOLAM HYDROCHLORIDE 2 MG: 1 INJECTION, SOLUTION INTRAMUSCULAR; INTRAVENOUS at 11:09

## (undated) DEVICE — CATH DECAPOLAR 6FRX110CM

## (undated) DEVICE — SUT 3-0 MONOCRYL PLUS PS-2

## (undated) DEVICE — SOL POVIDONE IODINE PCH 3/4OZ

## (undated) DEVICE — KIT TRIATHLON CR TIB PREP SZ5

## (undated) DEVICE — TUBE SUCTION MEDI-VAC STERILE

## (undated) DEVICE — GLOVE 6.5 PROTEXIS PI BLUE

## (undated) DEVICE — DRAPE MEDIUM SHEET 40X70IN

## (undated) DEVICE — DRAPE U STD FILM LG 60X84IN

## (undated) DEVICE — PIN BONE 4 X 140MM STERILE
Type: IMPLANTABLE DEVICE | Site: KNEE | Status: NON-FUNCTIONAL
Removed: 2024-12-13

## (undated) DEVICE — SUT ETHIBOND EXCEL 1 CTX 18

## (undated) DEVICE — INTRODUCER SHEATH 7F 11CM 35MM

## (undated) DEVICE — TIP PHACO 30 DEG BEVEL 20GA

## (undated) DEVICE — CARTRIDGE SHOOTER

## (undated) DEVICE — CATH ADVISOR VL CIRC MAP BI-D

## (undated) DEVICE — BLADE SAG DUAL CUT 18X90X1.35

## (undated) DEVICE — NDL ANES SPINAL 18X3.5ST 18G

## (undated) DEVICE — DRAPE STERI U-SHAPED 47X51IN

## (undated) DEVICE — KIT DRAPE RIO ONE PIECE W/POCK

## (undated) DEVICE — Device

## (undated) DEVICE — SYR 3ML LL 18GA 1.5IN

## (undated) DEVICE — KIT CHECKPOINT TIBIAL

## (undated) DEVICE — CATH VIEWFLEX XTRA ICE 9F 90CM

## (undated) DEVICE — GOWN POLY REINF X-LONG 2XL

## (undated) DEVICE — SHEATH 10FR 11CM BRITE TIP

## (undated) DEVICE — CLOSURE SKIN STERI STRIP 1/2X4

## (undated) DEVICE — COVER PROBE US 5.5X58L NON LTX

## (undated) DEVICE — GOWN POLY REINF BRTH SLV XL

## (undated) DEVICE — SHEATH 10FR 23CM BRITE TIP

## (undated) DEVICE — APPLICATOR CHLORAPREP ORN 26ML

## (undated) DEVICE — TAPE BROADBAND TWO PACK 1.5MM

## (undated) DEVICE — FLUID DELIVERY SET WITH FILTER

## (undated) DEVICE — SUT VICRYL PLUS 2-0 CT1 18

## (undated) DEVICE — COVER MAYO STAND REINFRCD 30

## (undated) DEVICE — DRESSING XEROFORM 1X8IN

## (undated) DEVICE — CATH TACTICATH ABLAT BIDIR D-F

## (undated) DEVICE — DRESSING TEGADERM 4.4X5IN

## (undated) DEVICE — DEVICE STRATAFIX SYMMETRIC +

## (undated) DEVICE — GLOVE SENSICARE PI GRN 8.5

## (undated) DEVICE — NDL NRG TRNSPTAL 71CM

## (undated) DEVICE — NDL ECLIPSE SAFETY 18GX1-1/2IN

## (undated) DEVICE — SYS CARTRIDGE MIXI PRISM II

## (undated) DEVICE — TAPE ADH MEDIPORE 4 X 10YDS

## (undated) DEVICE — SOL NACL IRR 3000ML

## (undated) DEVICE — PAD ABD 8X10 STERILE

## (undated) DEVICE — YANKAUER FLEX NO VENT REG CAP

## (undated) DEVICE — BLADE SURG STAINLESS STEEL #15

## (undated) DEVICE — TIP INTREPID I/A STR .03MM

## (undated) DEVICE — CUSHION  WC FOAM 20X20X.75IN

## (undated) DEVICE — SHEATH BRITE TIP INTRO 11CM 9F

## (undated) DEVICE — COVER TABLE HVY DTY 60X90IN

## (undated) DEVICE — SET TUBING COOL POINT IRR

## (undated) DEVICE — SYR W NDL BLN FILL 5ML 18GX1.5

## (undated) DEVICE — BLADE SURG CARBON STEEL SZ11

## (undated) DEVICE — KIT ENSITE ELECTRODE SURFACE

## (undated) DEVICE — SYR 50CC LL

## (undated) DEVICE — ELECTRODE PATIENT RETURN DISP

## (undated) DEVICE — STOCKINET 4INX48

## (undated) DEVICE — ADAPTER DUAL NSL LUER M-M 7FT

## (undated) DEVICE — GLOVE SENSICARE PI MICRO 7

## (undated) DEVICE — SPONGE COTTON TRAY 4X4IN

## (undated) DEVICE — GLOVE SENSICARE PI MICRO 8

## (undated) DEVICE — DRAPE SHOULDER BEACH CHAIR

## (undated) DEVICE — GLOVE PROTEXIS BLUE LATEX 8.5

## (undated) DEVICE — PAD ABDOMINAL STERILE 8X10IN

## (undated) DEVICE — PADDING WYTEX UNDRCST 6INX4YD

## (undated) DEVICE — SUT VICRYL PLUS 0 CT1 18IN

## (undated) DEVICE — HANDLE DEVON RIGID OR LIGHT

## (undated) DEVICE — PIN BONE 3.2X110MM
Type: IMPLANTABLE DEVICE | Site: KNEE | Status: NON-FUNCTIONAL
Removed: 2024-12-13

## (undated) DEVICE — SOL NACL IRR 1000ML BTL

## (undated) DEVICE — PACK EYE SWAN DISPOSABLE

## (undated) DEVICE — WRAP DEMAYO LEG STERILE

## (undated) DEVICE — GLOVE PROTEXIS LTX MICRO 8

## (undated) DEVICE — TUBE SET INFLOW/OUTFLOW

## (undated) DEVICE — BLADE MAKO STANDARD

## (undated) DEVICE — DRAPE FULL SHEET 70X100IN

## (undated) DEVICE — KIT VIZADISC KNEE TRACKING

## (undated) DEVICE — BLADE SHAVER GREAT WHITE 3.5MM

## (undated) DEVICE — KIT SURGICAL TURNOVER

## (undated) DEVICE — GLOVE PROTEXIS HYDROGEL SZ6.5

## (undated) DEVICE — PAD DEFIB RADIOLUCENT ADULT

## (undated) DEVICE — KIT VERSACROSS TRANSSEPTAL

## (undated) DEVICE — SPONGE GAUZE 16PLY 4X4

## (undated) DEVICE — KIT INTRO RADPQ BT 8FRX11CM

## (undated) DEVICE — CUFF ATS 2 PORT SNGL BLDR 34IN

## (undated) DEVICE — QUATTRO SUTURE PASSER NEEDLE

## (undated) DEVICE — CATH SUPREME QPLR CRD-2 6F 120

## (undated) DEVICE — BLADE SURG STAINLESS STEEL #10

## (undated) DEVICE — DRESSING XEROFORM NONADH 1X8IN

## (undated) DEVICE — INTRODUCER SWARTZ SL0 8.5FR

## (undated) DEVICE — BENZOIN TINCTURE CAPSULET

## (undated) DEVICE — GLOVE SIGNATURE ESSNTL LTX 6.5

## (undated) DEVICE — GLOVE SENSICARE PI GRN 6.5

## (undated) DEVICE — TAPE SILK 3IN